# Patient Record
Sex: MALE | Race: WHITE | NOT HISPANIC OR LATINO | Employment: FULL TIME | URBAN - METROPOLITAN AREA
[De-identification: names, ages, dates, MRNs, and addresses within clinical notes are randomized per-mention and may not be internally consistent; named-entity substitution may affect disease eponyms.]

---

## 2017-02-04 DIAGNOSIS — I25.10 CORONARY ARTERY DISEASE INVOLVING NATIVE CORONARY ARTERY OF NATIVE HEART WITHOUT ANGINA PECTORIS: Primary | ICD-10-CM

## 2017-02-04 NOTE — TELEPHONE ENCOUNTER
nitroglycerin (NITROSTAT) 0.4 MG SL tablet      Last Written Prescription Date: 7/10/15  Last Fill Quantity: 25,  # refills: 2   Last Office Visit with [facility], [facility] or [facility] prescribing provider: 6/21/16

## 2017-02-06 NOTE — TELEPHONE ENCOUNTER
Looks like Dr. Quezada, Cardiology has been refilling this.  Called the pharmacy to advise, spoke to Derek.      Will also forward to Dr. Quezada.

## 2017-02-07 RX ORDER — NITROGLYCERIN 0.4 MG/1
0.4 TABLET SUBLINGUAL EVERY 5 MIN PRN
Qty: 25 TABLET | Refills: 2 | Status: SHIPPED | OUTPATIENT
Start: 2017-02-07 | End: 2019-11-13

## 2017-03-02 DIAGNOSIS — R73.01 IMPAIRED FASTING GLUCOSE: ICD-10-CM

## 2017-03-02 NOTE — TELEPHONE ENCOUNTER
METFORMIN 500MG         Last Written Prescription Date: 9/2/2016  Last Fill Quantity: 180, # refills: 1  Last Office Visit with G, P or [facility] prescribing provider:  6/21/2016        BP Readings from Last 3 Encounters:   06/21/16 106/64   04/14/16 98/62   01/06/16 106/66     Lab Results   Component Value Date    MICROL <5 08/02/2016     No results found for: MICROALBUMIN  Creatinine   Date Value Ref Range Status   10/13/2016 1.16 0.70 - 1.30 mg/dL Final   ]  GFR Estimate   Date Value Ref Range Status   10/13/2016 63 >60 mL/min/1.7m2 Final   04/14/2016 79 >60 mL/min/1.7m2 Final     Comment:     Non  GFR Calc   10/08/2015 77 >60 mL/min/1.7m2 Final     GFR Estimate If Black   Date Value Ref Range Status   10/13/2016 77 >60 mL/min/1.7m2 Final   04/14/2016 >90   GFR Calc   >60 mL/min/1.7m2 Final   10/08/2015 >90 >60 mL/min/1.7m2 Final     Lab Results   Component Value Date    CHOL 141 10/13/2016     Lab Results   Component Value Date    HDL 51 10/13/2016     Lab Results   Component Value Date    LDL 57 10/13/2016     Lab Results   Component Value Date    TRIG 165 10/13/2016     Lab Results   Component Value Date    CHOLHDLRATIO 2.6 10/08/2015     Lab Results   Component Value Date    AST 26 05/18/2010     Lab Results   Component Value Date    ALT <5 10/13/2016     Lab Results   Component Value Date    A1C 6.1 06/21/2016    A1C 6.2 02/05/2016    A1C 6.4 01/21/2015     Potassium   Date Value Ref Range Status   10/13/2016 4.1 3.5 - 5.1 mmol/L Final

## 2017-03-03 NOTE — TELEPHONE ENCOUNTER
"Prescription approved per [facility] Refill Protocol.    Copied from 6/21/16 office visit plan:  \"follow up in 1 year for follow up with me\"  "

## 2017-04-24 ENCOUNTER — PRE VISIT (OUTPATIENT)
Dept: CARDIOLOGY | Facility: CLINIC | Age: 67
End: 2017-04-24

## 2017-04-25 ENCOUNTER — OFFICE VISIT (OUTPATIENT)
Dept: CARDIOLOGY | Facility: CLINIC | Age: 67
End: 2017-04-25
Payer: COMMERCIAL

## 2017-04-25 VITALS
HEIGHT: 65 IN | DIASTOLIC BLOOD PRESSURE: 60 MMHG | SYSTOLIC BLOOD PRESSURE: 120 MMHG | WEIGHT: 185 LBS | BODY MASS INDEX: 30.82 KG/M2 | HEART RATE: 60 BPM

## 2017-04-25 DIAGNOSIS — I25.10 CORONARY ARTERY DISEASE INVOLVING NATIVE CORONARY ARTERY OF NATIVE HEART WITHOUT ANGINA PECTORIS: ICD-10-CM

## 2017-04-25 DIAGNOSIS — E78.2 MIXED HYPERLIPIDEMIA: ICD-10-CM

## 2017-04-25 DIAGNOSIS — I25.10 CORONARY ARTERY DISEASE INVOLVING NATIVE CORONARY ARTERY OF NATIVE HEART WITHOUT ANGINA PECTORIS: Primary | ICD-10-CM

## 2017-04-25 LAB
ALT SERPL W P-5'-P-CCNC: <5 U/L (ref 5–30)
ANION GAP SERPL CALCULATED.3IONS-SCNC: 11.2 MMOL/L (ref 6–17)
BUN SERPL-MCNC: 14 MG/DL (ref 7–30)
CALCIUM SERPL-MCNC: 9.3 MG/DL (ref 8.5–10.5)
CHLORIDE SERPL-SCNC: 103 MMOL/L (ref 98–107)
CHOLEST SERPL-MCNC: 136 MG/DL
CO2 SERPL-SCNC: 26 MMOL/L (ref 23–29)
CREAT SERPL-MCNC: 0.96 MG/DL (ref 0.7–1.3)
GFR SERPL CREATININE-BSD FRML MDRD: 79 ML/MIN/1.7M2
GLUCOSE SERPL-MCNC: 110 MG/DL (ref 70–105)
HDLC SERPL-MCNC: 44 MG/DL
LDLC SERPL CALC-MCNC: 44 MG/DL
NONHDLC SERPL-MCNC: 92 MG/DL
POTASSIUM SERPL-SCNC: 4.2 MMOL/L (ref 3.5–5.1)
SODIUM SERPL-SCNC: 136 MMOL/L (ref 136–145)
TRIGL SERPL-MCNC: 242 MG/DL

## 2017-04-25 PROCEDURE — 80061 LIPID PANEL: CPT | Performed by: PHYSICIAN ASSISTANT

## 2017-04-25 PROCEDURE — 99214 OFFICE O/P EST MOD 30 MIN: CPT | Performed by: INTERNAL MEDICINE

## 2017-04-25 PROCEDURE — 36415 COLL VENOUS BLD VENIPUNCTURE: CPT | Performed by: PHYSICIAN ASSISTANT

## 2017-04-25 PROCEDURE — 80048 BASIC METABOLIC PNL TOTAL CA: CPT | Performed by: PHYSICIAN ASSISTANT

## 2017-04-25 PROCEDURE — 84460 ALANINE AMINO (ALT) (SGPT): CPT | Performed by: PHYSICIAN ASSISTANT

## 2017-04-25 NOTE — MR AVS SNAPSHOT
After Visit Summary   4/25/2017    Guillermo Ford    MRN: [id number]           Patient Information     Date Of Birth          1950        Visit Information        Provider Department      4/25/2017 4:15 PM Hemal Quezada MD [facility]        Today's Diagnoses     Coronary artery disease involving native coronary artery of native heart without angina pectoris    -  1    Mixed hyperlipidemia           Follow-ups after your visit        Additional Services     Follow-Up with Cardiologist                 Your next 10 appointments already scheduled     Apr 26, 2017  8:00 AM CDT   SHORT with Laura Acevedo MD   [facility] ([facility])    [address]  [address]  [place] [address]   [phone]              Future tests that were ordered for you today     Open Future Orders        Priority Expected Expires Ordered    Exercise Stress Echocardiogram Routine 8/23/2017 4/25/2018 4/25/2017    Follow-Up with Cardiologist Routine 8/23/2017 4/25/2018 4/25/2017    Basic metabolic panel Routine 8/23/2017 4/25/2018 4/25/2017    Lipid Profile Routine 8/23/2017 4/25/2018 4/25/2017    ALT Routine 8/23/2017 4/25/2018 4/25/2017            Who to contact     If you have questions or need follow up information about today's clinic visit or your schedule please contact [facility] directly at [phone].  Normal or non-critical lab and imaging results will be communicated to you by MyChart, letter or phone within 4 business days after the clinic has received the results. If you do not hear from us within 7 days, please contact the clinic through MyChart or phone. If you have a critical or abnormal lab result, we will notify you by phone as soon as possible.  Submit refill requests through [email or web address] or call your pharmacy and they will forward the refill request to us. Please allow 3  "business days for your refill to be completed.          Additional Information About Your Visit        MyChart Information     [employer] gives you secure access to your electronic health record. If you see a primary care provider, you can also send messages to your care team and make appointments. If you have questions, please call your primary care clinic.  If you do not have a primary care provider, please call [phone] and they will assist you.        Care EveryWhere ID     This is your Care EveryWhere ID. This could be used by other organizations to access your [facility] medical records  [id number]        Your Vitals Were     Pulse Height BMI (Body Mass Index)             60 1.651 m (5' 5\") 30.79 kg/m2          Blood Pressure from Last 3 Encounters:   04/25/17 120/60   06/21/16 106/64   04/14/16 98/62    Weight from Last 3 Encounters:   04/25/17 83.9 kg (185 lb)   06/21/16 82.9 kg (182 lb 12.8 oz)   04/14/16 82.6 kg (182 lb)               Primary Care Provider Office Phone # Fax #    Kvng Smith [phone]       [address] [place] [address]        Thank you!     Thank you for choosing [facility] [place]  for your care. Our goal is always to provide you with excellent care. Hearing back from our patients is one way we can continue to improve our services. Please take a few minutes to complete the written survey that you may receive in the mail after your visit with us. Thank you!             Your Updated Medication List - Protect others around you: Learn how to safely use, store and throw away your medicines at www.disposemymeds.org.          This list is accurate as of: 4/25/17  5:01 PM.  Always use your most recent med list.                   Brand Name Dispense Instructions for use    aspirin 81 MG tablet      Take 81 mg by mouth Takes every other day.       clopidogrel 75 MG tablet    PLAVIX    90 tablet    Take 1 " tablet (75 mg) by mouth daily       ezetimibe-simvastatin 10-20 MG per tablet    VYTORIN    90 tablet    Take 1 tablet by mouth At Bedtime       lisinopril 5 MG tablet    PRINIVIL/ZESTRIL    90 tablet    Take 1 tablet (5 mg) by mouth daily       metFORMIN 500 MG tablet    GLUCOPHAGE    180 tablet    Take 1 tablet (500 mg) by mouth 2 times daily (with meals)       metoprolol 100 MG 24 hr tablet    TOPROL XL    90 tablet    Take 1 tablet (100 mg) by mouth daily       nitroglycerin 0.4 MG sublingual tablet    NITROSTAT    25 tablet    Place 1 tablet (0.4 mg) under the tongue every 5 minutes as needed for chest pain May repeat X 2. If no relief after 3 tablets call 911       sildenafil 50 MG cap/tab    VIAGRA    12 tablet    Take 0.5-1 tablets (25-50 mg) by mouth daily as needed for erectile dysfunction DO NOT USE WITH NITROGLYCERIN

## 2017-04-25 NOTE — LETTER
4/25/2017    Kvng Smith MD  [facility]   [address] [place] [address]    RE: Guillermo Ford       Dear Colleague,    I had the pleasure of seeing Guillermo Ford in the [facility].    The patient is a 66-year-old overweight white male hospitalized at [facility] in 2005 with evidence of an inferior wall myocardial infarction after being transferred from [facility].  Cardiac catheterization and coronary angiography that revealed severe stenosis of the distal right coronary artery involving the PDA treated with PTCA and stent placement and an old angioplasty stent in the ostium of the posterior descending coronary artery.  There was previous hypokinesis in the inferior wall.  Ejection fraction at that time estimated at 35%-40%.        Echocardiography performed in 2015 demonstrated ejection fraction 55%-60% with diastolic dysfunction with no significant regional wall motion abnormality, no significant valvular insufficiency or dysfunction.  Nuclear stress test performed also in 2015 showed a small scar involving the inferior wall without vicenta-infarct ischemia with mild inferior hypokinesis with ejection fraction 54%, no significant change from 2013.        The patient is participating in activities without significant restriction.  He denies significant symptoms of chest discomfort, shortness of breath, dizziness, palpitations, nausea, vomiting, diaphoresis or syncope.  He denies PND, orthopnea, fever, chills or sweats.      MEDICATIONS:   1.  Metformin 500 mg twice a day with meals.   2.  Nitroglycerin 0.4 mg sublingual p.r.n., which has not been used.   3.  Metoprolol  mg a day.   4.  Lisinopril 5 mg a day.   5.  Viagra 25-50 mg as-needed.   6.  Ezetimibe/simvastatin 10-20 mg 1 at bedtime.   7.  Clopidogrel 75 mg a day.   8.  Aspirin 81 mg a day.      LABORATORY DATA:  Demonstrated cholesterol 136, HDL 44, LDL 44,  triglyceride 242.  Sodium 136, potassium 4.2, BUN 14, creatinine 0.96.  ALT less than 5.      The patient presents to Cardiology Clinic for followup of ischemic heart disease.      PHYSICAL EXAMINATION:   VITAL SIGNS:  Blood pressure 120/60 with a heart rate of 60 and regular.  Weight was 185 pounds, which is stable.   NECK:  Without jugular venous distention, carotid bruit or palpable thyroid.   CHEST:  Essentially clear to percussion and auscultation with slight decreased breath sounds at the bases.   CARDIAC:  Regular rhythm.  There is a soft S4 gallop.  There is a 1-2/6 systolic murmur at the left sternal border with minimal radiation.  No diastolic murmur, rub or S3.   EXTREMITIES:  Without cyanosis or edema.      CLINICAL IMPRESSION:   1.  Stable cardiac condition.   2.  History of ischemic heart disease, status post inferior wall myocardial infarction, PTCA and stent in the proximal posterior descending branch of the right coronary artery in 2005.   3.  History of hyperlipidemia with metabolic syndrome at goal with hypertriglyceridemia.   4.  Sleep apnea.   5.  Gastroesophageal reflux disease.   6.  Gout.   7.  Overweight.   8.  Type 2 diabetes mellitus, non-insulin dependent.      DISCUSSION:  The patient has done reasonably well from a cardiac viewpoint.  He unfortunately has been unable to lose any weight, but has not had any significant symptoms of angina pectoris or congestive heart failure.  He is being treated aggressively for type 2 diabetes/noninsulin dependent, which is most likely related somewhat to his increased weight.  LDL cholesterol has been decreased, but there has also been continued hypertriglyceridemia.  We would continue his medications unchanged at this time.  He will need close followup of serum lipids, basic metabolic panel and blood pressure.  He will have stress testing in the form of stress echocardiography later this year to evaluate ischemic status of the myocardium and medical  efficacy.  Otherwise, he appears stable and is tolerating his medications without significant problem.      RECOMMENDATIONS:   1.  Continue present medications.   2.  Diet and exercise program to lose weight.   3.  Aggressive diabetic management.   4.  Close followup of serum lipids, basic metabolic panel and blood pressure.   5.  Stress echocardiography on medication to check ischemic status of the myocardium and medical efficacy.   6.  Routine medical followup.   7.  Cardiology followup in 4-6 months.                Again, thank you for allowing me to participate in the care of your patient.      Sincerely,    Hemal Quezada MD     [facility]

## 2017-04-26 ENCOUNTER — OFFICE VISIT (OUTPATIENT)
Dept: PEDIATRICS | Facility: CLINIC | Age: 67
End: 2017-04-26
Payer: COMMERCIAL

## 2017-04-26 VITALS
HEART RATE: 64 BPM | WEIGHT: 184 LBS | OXYGEN SATURATION: 94 % | BODY MASS INDEX: 30.66 KG/M2 | DIASTOLIC BLOOD PRESSURE: 68 MMHG | SYSTOLIC BLOOD PRESSURE: 118 MMHG | HEIGHT: 65 IN | TEMPERATURE: 98 F

## 2017-04-26 DIAGNOSIS — H61.21 IMPACTED CERUMEN OF RIGHT EAR: Primary | ICD-10-CM

## 2017-04-26 PROCEDURE — 99213 OFFICE O/P EST LOW 20 MIN: CPT | Mod: GC | Performed by: INTERNAL MEDICINE

## 2017-04-26 NOTE — PATIENT INSTRUCTIONS
Glad we could clean out the ear for you!  If this happens again in the future you are welcome to try some Auralgan drops prior to coming in. We would be glad to help either way.    It was a pleasure to see you today!    Dr. Laura Acevedo

## 2017-04-26 NOTE — PROGRESS NOTES
SUBJECTIVE:                                                    Guillermo Frod is a 66 year old male who presents to clinic today for the following health issues:    Ear Problem       Duration: 5 days     Description (location/character/radiation): Right ear feels plugged.    Intensity:  moderate    Accompanying signs and symptoms: no pain, runny nose, fever, cough. Left ear feels normal.    History (similar episodes/previous evaluation): One prior episode.    Precipitating or alleviating factors: Worse in the morning after his shower and gets better as the day goes on.     Therapies tried and outcome: None- not tried drops     Problem list and histories reviewed & adjusted, as indicated.  Additional history: as documented    Patient Active Problem List   Diagnosis     Lumbago     Gout     Diverticulitis of colon     Lumbar spinal stenosis     CAD (coronary artery disease)     Mixed hyperlipidemia     Sleep apnea     GERD (gastroesophageal reflux disease)     Impaired fasting glucose     History of acute inferior wall MI     Left ventricular diastolic dysfunction     ACP (advance care planning)     Past Surgical History:   Procedure Laterality Date     HC CORONARY STENT PERCUT, INITIAL VESSEL  4/18/2005    PTCA with intracoronary stent placement of, distal RCA and ostial PDA     HC REPAIR OF NASAL SEPTUM      Septoplasty and uvulectomy       Social History   Substance Use Topics     Smoking status: Former Smoker     Quit date: 4/27/2000     Smokeless tobacco: Never Used      Comment: previous 2 ppd smoker     Alcohol use No     Family History   Problem Relation Age of Onset     C.A.D. Mother      C.A.D. Father      DIABETES Brother      unsure of type 1/2     Cancer - colorectal No family hx of      Prostate Cancer No family hx of          Current Outpatient Prescriptions   Medication Sig Dispense Refill     metFORMIN (GLUCOPHAGE) 500 MG tablet Take 1 tablet (500 mg) by mouth 2 times daily (with meals) 180 tablet 0  "    metoprolol (TOPROL XL) 100 MG 24 hr tablet Take 1 tablet (100 mg) by mouth daily 90 tablet 3     lisinopril (PRINIVIL,ZESTRIL) 5 MG tablet Take 1 tablet (5 mg) by mouth daily 90 tablet 3     ezetimibe-simvastatin (VYTORIN) 10-20 MG per tablet Take 1 tablet by mouth At Bedtime 90 tablet 3     clopidogrel (PLAVIX) 75 MG tablet Take 1 tablet (75 mg) by mouth daily 90 tablet 3     aspirin 81 MG tablet Take 81 mg by mouth Takes every other day.       nitroglycerin (NITROSTAT) 0.4 MG sublingual tablet Place 1 tablet (0.4 mg) under the tongue every 5 minutes as needed for chest pain May repeat X 2. If no relief after 3 tablets call 911 (Patient not taking: Reported on 4/26/2017) 25 tablet 2     sildenafil (VIAGRA) 50 MG tablet Take 0.5-1 tablets (25-50 mg) by mouth daily as needed for erectile dysfunction DO NOT USE WITH NITROGLYCERIN 12 tablet 3     No Known Allergies  BP Readings from Last 3 Encounters:   04/26/17 118/68   04/25/17 120/60   06/21/16 106/64    Wt Readings from Last 3 Encounters:   04/26/17 184 lb (83.5 kg)   04/25/17 185 lb (83.9 kg)   06/21/16 182 lb 12.8 oz (82.9 kg)         Reviewed and updated as needed this visit by clinical staff  Tobacco  Allergies  Meds  Problems  Med Hx  Surg Hx  Fam Hx  Soc Hx        Reviewed and updated as needed this visit by Provider  Meds  Problems       ROS:  Constitutional, HEENT, cardiovascular, pulmonary, gi and gu systems are negative, except as otherwise noted.    OBJECTIVE:                                                    /68 (BP Location: Right arm, Cuff Size: Adult Regular)  Pulse 64  Temp 98  F (36.7  C) (Oral)  Ht 5' 5\" (1.651 m)  Wt 184 lb (83.5 kg)  SpO2 94%  BMI 30.62 kg/m2  Body mass index is 30.62 kg/(m^2).  GENERAL: healthy, alert and no distress  EYES: Eyes grossly normal to inspection, conjunctivae and sclerae normal  HENT: Right TM unable to be visualized due to large portion of cerumen. After lavage, R TM visualized. R EAC " mildly erythematous after lavage. L TM clear.   Nose and mouth without ulcers or lesions  RESP: lungs clear to auscultation - no rales, rhonchi or wheezes  CV: regular rate and rhythm, normal S1 S2, no S3 or S4, no murmur, click or rub    Diagnostic Test Results:  none      ASSESSMENT/PLAN:                                                    (H61.21) Impacted cerumen of right ear  (primary encounter diagnosis)  Comment: Seen on exam. Removed with lavage with symptom relief afterward. Discussed prevention ideas and/or over the counter options  Plan: Lavage.   -Recommended OTC debrox drops if happens again    Follow-up: as needed    Laura Acevedo MD  [facility]    I have seen patient, discussed with the resident, and agree with the history, physical and plan as documented above.    Olimpia Smith MD  Internal Medicine - Pediatrics

## 2017-04-26 NOTE — MR AVS SNAPSHOT
After Visit Summary   4/26/2017    Guillermo Ford    MRN: [id number]           Patient Information     Date Of Birth          1950        Visit Information        Provider Department      4/26/2017 8:00 AM [facility] MD [facility]        Today's Diagnoses     Impacted cerumen of right ear    -  1      Care Instructions    Glad we could clean out the ear for you!  If this happens again in the future you are welcome to try some Auralgan drops prior to coming in. We would be glad to help either way.    It was a pleasure to see you today!    Dr. Laura Acevedo          Follow-ups after your visit        Future tests that were ordered for you today     Open Future Orders        Priority Expected Expires Ordered    Exercise Stress Echocardiogram Routine 8/23/2017 4/25/2018 4/25/2017    Follow-Up with Cardiologist Routine 8/23/2017 4/25/2018 4/25/2017    Basic metabolic panel Routine 8/23/2017 4/25/2018 4/25/2017    Lipid Profile Routine 8/23/2017 4/25/2018 4/25/2017    ALT Routine 8/23/2017 4/25/2018 4/25/2017            Who to contact     If you have questions or need follow up information about today's clinic visit or your schedule please contact [facility] directly at [phone].  Normal or non-critical lab and imaging results will be communicated to you by MyChart, letter or phone within 4 business days after the clinic has received the results. If you do not hear from us within 7 days, please contact the clinic through MyChart or phone. If you have a critical or abnormal lab result, we will notify you by phone as soon as possible.  Submit refill requests through [employer] or call your pharmacy and they will forward the refill request to us. Please allow 3 business days for your refill to be completed.          Additional Information About Your Visit        J[employer] Information     [employer] gives you secure access to your electronic health record. If you see a primary  "care provider, you can also send messages to your care team and make appointments. If you have questions, please call your primary care clinic.  If you do not have a primary care provider, please call [phone] and they will assist you.        Care EveryWhere ID     This is your Care EveryWhere ID. This could be used by other organizations to access your [facility] medical records  [id number]        Your Vitals Were     Pulse Temperature Height Pulse Oximetry BMI (Body Mass Index)       64 98  F (36.7  C) (Oral) 5' 5\" (1.651 m) 94% 30.62 kg/m2        Blood Pressure from Last 3 Encounters:   04/26/17 118/68   04/25/17 120/60   06/21/16 106/64    Weight from Last 3 Encounters:   04/26/17 184 lb (83.5 kg)   04/25/17 185 lb (83.9 kg)   06/21/16 182 lb 12.8 oz (82.9 kg)              Today, you had the following     No orders found for display       Primary Care Provider Office Phone # Fax #    Kvng Smith [phone]       [address] [place] [address]        Thank you!     Thank you for choosing [facility]  for your care. Our goal is always to provide you with excellent care. Hearing back from our patients is one way we can continue to improve our services. Please take a few minutes to complete the written survey that you may receive in the mail after your visit with us. Thank you!             Your Updated Medication List - Protect others around you: Learn how to safely use, store and throw away your medicines at www.disposemymeds.org.          This list is accurate as of: 4/26/17  8:34 AM.  Always use your most recent med list.                   Brand Name Dispense Instructions for use    aspirin 81 MG tablet      Take 81 mg by mouth Takes every other day.       clopidogrel 75 MG tablet    PLAVIX    90 tablet    Take 1 tablet (75 mg) by mouth daily       ezetimibe-simvastatin 10-20 MG per tablet    VYTORIN    90 tablet    Take 1 tablet by mouth At " Bedtime       lisinopril 5 MG tablet    PRINIVIL/ZESTRIL    90 tablet    Take 1 tablet (5 mg) by mouth daily       metFORMIN 500 MG tablet    GLUCOPHAGE    180 tablet    Take 1 tablet (500 mg) by mouth 2 times daily (with meals)       metoprolol 100 MG 24 hr tablet    TOPROL XL    90 tablet    Take 1 tablet (100 mg) by mouth daily       nitroglycerin 0.4 MG sublingual tablet    NITROSTAT    25 tablet    Place 1 tablet (0.4 mg) under the tongue every 5 minutes as needed for chest pain May repeat X 2. If no relief after 3 tablets call 911       sildenafil 50 MG cap/tab    VIAGRA    12 tablet    Take 0.5-1 tablets (25-50 mg) by mouth daily as needed for erectile dysfunction DO NOT USE WITH NITROGLYCERIN

## 2017-04-26 NOTE — PROGRESS NOTES
HISTORY OF PRESENT ILLNESS:  The patient is a 66-year-old overweight white male hospitalized at [facility] in 2005 with evidence of an inferior wall myocardial infarction after being transferred from [facility].  Cardiac catheterization and coronary angiography that revealed severe stenosis of the distal right coronary artery involving the PDA treated with PTCA and stent placement and an old angioplasty stent in the ostium of the posterior descending coronary artery.  There was previous hypokinesis in the inferior wall.  Ejection fraction at that time estimated at 35%-40%.        Echocardiography performed in 2015 demonstrated ejection fraction 55%-60% with diastolic dysfunction with no significant regional wall motion abnormality, no significant valvular insufficiency or dysfunction.  Nuclear stress test performed also in 2015 showed a small scar involving the inferior wall without vicenta-infarct ischemia with mild inferior hypokinesis with ejection fraction 54%, no significant change from 2013.        The patient is participating in activities without significant restriction.  He denies significant symptoms of chest discomfort, shortness of breath, dizziness, palpitations, nausea, vomiting, diaphoresis or syncope.  He denies PND, orthopnea, fever, chills or sweats.      MEDICATIONS:   1.  Metformin 500 mg twice a day with meals.   2.  Nitroglycerin 0.4 mg sublingual p.r.n., which has not been used.   3.  Metoprolol  mg a day.   4.  Lisinopril 5 mg a day.   5.  Viagra 25-50 mg as-needed.   6.  Ezetimibe/simvastatin 10-20 mg 1 at bedtime.   7.  Clopidogrel 75 mg a day.   8.  Aspirin 81 mg a day.      LABORATORY DATA:  Demonstrated cholesterol 136, HDL 44, LDL 44, triglyceride 242.  Sodium 136, potassium 4.2, BUN 14, creatinine 0.96.  ALT less than 5.      The patient presents to Cardiology Clinic for followup of ischemic heart disease.      PHYSICAL EXAMINATION:   VITAL SIGNS:   Blood pressure 120/60 with a heart rate of 60 and regular.  Weight was 185 pounds, which is stable.   NECK:  Without jugular venous distention, carotid bruit or palpable thyroid.   CHEST:  Essentially clear to percussion and auscultation with slight decreased breath sounds at the bases.   CARDIAC:  Regular rhythm.  There is a soft S4 gallop.  There is a 1-2/6 systolic murmur at the left sternal border with minimal radiation.  No diastolic murmur, rub or S3.   EXTREMITIES:  Without cyanosis or edema.      CLINICAL IMPRESSION:   1.  Stable cardiac condition.   2.  History of ischemic heart disease, status post inferior wall myocardial infarction, PTCA and stent in the proximal posterior descending branch of the right coronary artery in 2005.   3.  History of hyperlipidemia with metabolic syndrome at goal with hypertriglyceridemia.   4.  Sleep apnea.   5.  Gastroesophageal reflux disease.   6.  Gout.   7.  Overweight.   8.  Type 2 diabetes mellitus, non-insulin dependent.      DISCUSSION:  The patient has done reasonably well from a cardiac viewpoint.  He unfortunately has been unable to lose any weight, but has not had any significant symptoms of angina pectoris or congestive heart failure.  He is being treated aggressively for type 2 diabetes/noninsulin dependent, which is most likely related somewhat to his increased weight.  LDL cholesterol has been decreased, but there has also been continued hypertriglyceridemia.  We would continue his medications unchanged at this time.  He will need close followup of serum lipids, basic metabolic panel and blood pressure.  He will have stress testing in the form of stress echocardiography later this year to evaluate ischemic status of the myocardium and medical efficacy.  Otherwise, he appears stable and is tolerating his medications without significant problem.      RECOMMENDATIONS:   1.  Continue present medications.   2.  Diet and exercise program to lose weight.   3.   Aggressive diabetic management.   4.  Close followup of serum lipids, basic metabolic panel and blood pressure.   5.  Stress echocardiography on medication to check ischemic status of the myocardium and medical efficacy.   6.  Routine medical followup.   7.  Cardiology followup in 4-6 months.      cc:      Kvng Smith MD    [address] [address]         ANGELINA RAMON MD, [facility]             D: 2017 22:11   T: 2017 16:59   MT: EDMOND      Name:     LOLA GRIFFIN   MRN:      [id number]        Account:      [id number]   :      1950           Service Date: 2017      Document: [id number]

## 2017-04-26 NOTE — NURSING NOTE
Cerumenosis is noted in right ear.  Wax is removed by syringing with normal h2o/1-2 drops of perioxide and manual debridement. Pt tolerated well .Abril HAJI MA

## 2017-04-26 NOTE — NURSING NOTE
"Chief Complaint   Patient presents with     Ear Problem       Initial /68 (BP Location: Right arm, Cuff Size: Adult Regular)  Pulse 64  Temp 98  F (36.7  C) (Oral)  Ht 5' 5\" (1.651 m)  Wt 184 lb (83.5 kg)  SpO2 94%  BMI 30.62 kg/m2 Estimated body mass index is 30.62 kg/(m^2) as calculated from the following:    Height as of this encounter: 5' 5\" (1.651 m).    Weight as of this encounter: 184 lb (83.5 kg).  Medication Reconciliation: complete   Ileana Del Cid MA    "

## 2017-06-03 DIAGNOSIS — R73.01 IMPAIRED FASTING GLUCOSE: ICD-10-CM

## 2017-06-05 DIAGNOSIS — E78.2 MIXED HYPERLIPIDEMIA: ICD-10-CM

## 2017-06-05 RX ORDER — EZETIMIBE AND SIMVASTATIN 10; 20 MG/1; MG/1
1 TABLET ORAL AT BEDTIME
Qty: 90 TABLET | Refills: 3 | Status: SHIPPED | OUTPATIENT
Start: 2017-06-05 | End: 2018-06-01

## 2017-06-05 NOTE — TELEPHONE ENCOUNTER
Made an appointment for physical and labs 6/16/17, but needs to be after 6/21/17.     No answer when I called back. LM to call back and change date.     Toya Altamirano MA   June 5, 2017,  4:10 PM

## 2017-06-05 NOTE — TELEPHONE ENCOUNTER
Medication is being filled for 1 time refill only due to:  due for an A1C.    Please call patient to help schedule lab visit.   Ana Candelario RN  Triage Nurse

## 2017-06-05 NOTE — TELEPHONE ENCOUNTER
metFORMIN (GLUCOPHAGE) 500 MG tablet         Last Written Prescription Date: 3/3/2017  Last Fill Quantity: 180, # refills: 0  Last Office Visit with G, [facility] or [facility] prescribing provider:  4/26/2017        BP Readings from Last 3 Encounters:   04/26/17 118/68   04/25/17 120/60   06/21/16 106/64     Lab Results   Component Value Date    MICROL <5 08/02/2016     Lab Results   Component Value Date    UMALCR Unable to calculate due to low value 08/02/2016     Creatinine   Date Value Ref Range Status   04/25/2017 0.96 0.70 - 1.30 mg/dL Final   ]  GFR Estimate   Date Value Ref Range Status   04/25/2017 79 >60 mL/min/1.7m2 Final   10/13/2016 63 >60 mL/min/1.7m2 Final   04/14/2016 79 >60 mL/min/1.7m2 Final     Comment:     Non  GFR Calc     GFR Estimate If Black   Date Value Ref Range Status   04/25/2017 >90 >60 mL/min/1.7m2 Final   10/13/2016 77 >60 mL/min/1.7m2 Final   04/14/2016 >90   GFR Calc   >60 mL/min/1.7m2 Final     Lab Results   Component Value Date    CHOL 136 04/25/2017     Lab Results   Component Value Date    HDL 44 04/25/2017     Lab Results   Component Value Date    LDL 44 04/25/2017     Lab Results   Component Value Date    TRIG 242 04/25/2017     Lab Results   Component Value Date    CHOLHDLRATIO 2.6 10/08/2015     Lab Results   Component Value Date    AST 26 05/18/2010     Lab Results   Component Value Date    ALT <5 04/25/2017     Lab Results   Component Value Date    A1C 6.1 06/21/2016    A1C 6.2 02/05/2016    A1C 6.4 01/21/2015     Potassium   Date Value Ref Range Status   04/25/2017 4.2 3.5 - 5.1 mmol/L Final

## 2017-06-23 DIAGNOSIS — I25.10 CORONARY ARTERY DISEASE INVOLVING NATIVE CORONARY ARTERY WITHOUT ANGINA PECTORIS: ICD-10-CM

## 2017-06-23 RX ORDER — CLOPIDOGREL BISULFATE 75 MG/1
75 TABLET ORAL DAILY
Qty: 90 TABLET | Refills: 0 | Status: SHIPPED | OUTPATIENT
Start: 2017-06-23 | End: 2017-06-26

## 2017-06-26 DIAGNOSIS — I25.10 CORONARY ARTERY DISEASE INVOLVING NATIVE CORONARY ARTERY WITHOUT ANGINA PECTORIS: ICD-10-CM

## 2017-06-26 RX ORDER — CLOPIDOGREL BISULFATE 75 MG/1
75 TABLET ORAL DAILY
Qty: 90 TABLET | Refills: 3 | Status: SHIPPED | OUTPATIENT
Start: 2017-06-26 | End: 2018-07-05

## 2017-06-29 ENCOUNTER — TELEPHONE (OUTPATIENT)
Dept: CARDIOLOGY | Facility: CLINIC | Age: 67
End: 2017-06-29

## 2017-06-29 NOTE — TELEPHONE ENCOUNTER
Message from patient that he needs a refill, did not state which medication needs an update. Attempted to contact patient and left message for patient to call back.

## 2017-06-29 NOTE — TELEPHONE ENCOUNTER
Spoke with patient to let him know the plavix refill was sent on Monday at 12:48. Patient states he had checked there on Monday morning. He thinks this is the refill he needed this week and will call back if he needs any others sent in.

## 2017-08-12 DIAGNOSIS — R73.01 IMPAIRED FASTING GLUCOSE: ICD-10-CM

## 2017-08-12 DIAGNOSIS — N52.9 ERECTILE DYSFUNCTION, UNSPECIFIED ERECTILE DYSFUNCTION TYPE: Primary | ICD-10-CM

## 2017-08-12 NOTE — TELEPHONE ENCOUNTER
Pending Prescriptions:                       Disp   Refills    sildenafil (VIAGRA) 50 MG cap/tab         12 tab*3            Sig: Take 0.5-1 tablets (25-50 mg) by mouth daily as           needed for erectile dysfunction DO NOT USE WITH           NITROGLYCERIN          Last Written Prescription Date: 09/02/2016  Last Fill Quantity: 12,  # refills: 3   Last Office Visit with [facility], [facility] or [facility] prescribing provider: 04/26/2017                                         Next 5 appointments (look out 90 days)     Oct 03, 2017  8:45 AM CDT   Return Visit with Hemal Quezada MD   [facility] [place] ([facility])    [address] [place] [address] [address]  [facility] [id number] [phone]                  Graeme FLEMINGT

## 2017-08-14 NOTE — TELEPHONE ENCOUNTER
metFORMIN (GLUCOPHAGE) 500 MG tablet         Last Written Prescription Date: 6/5/2017  Last Fill Quantity: 60, # refills: 0  Last Office Visit with [facility], [facility] or [facility] prescribing provider:  4/26/2017   Next 5 appointments (look out 90 days)     Aug 18, 2017  9:20 AM CDT   PHYSICAL with Kvng Smith MD   [facility] ([facility])    [address]  [address]  [place] [id number] [phone]            Oct 03, 2017  8:45 AM CDT   Return Visit with Hemal Quezada MD   [facility] AT [place] ([facility])    [address]  [place] [address] [phone]                   BP Readings from Last 3 Encounters:   04/26/17 118/68   04/25/17 120/60   06/21/16 106/64     Lab Results   Component Value Date    MICROL <5 08/02/2016     Lab Results   Component Value Date    UMALCR Unable to calculate due to low value 08/02/2016     Creatinine   Date Value Ref Range Status   04/25/2017 0.96 0.70 - 1.30 mg/dL Final   ]  GFR Estimate   Date Value Ref Range Status   04/25/2017 79 >60 mL/min/1.7m2 Final   10/13/2016 63 >60 mL/min/1.7m2 Final   04/14/2016 79 >60 mL/min/1.7m2 Final     Comment:     Non  GFR Calc     GFR Estimate If Black   Date Value Ref Range Status   04/25/2017 >90 >60 mL/min/1.7m2 Final   10/13/2016 77 >60 mL/min/1.7m2 Final   04/14/2016 >90   GFR Calc   >60 mL/min/1.7m2 Final     Lab Results   Component Value Date    CHOL 136 04/25/2017     Lab Results   Component Value Date    HDL 44 04/25/2017     Lab Results   Component Value Date    LDL 44 04/25/2017     Lab Results   Component Value Date    TRIG 242 04/25/2017     Lab Results   Component Value Date    CHOLHDLRATIO 2.6 10/08/2015     Lab Results   Component Value Date    AST 26 05/18/2010     Lab Results   Component Value Date    ALT <5 04/25/2017     Lab Results   Component Value Date    A1C 6.1 06/21/2016    A1C 6.2  02/05/2016    A1C 6.4 01/21/2015     Potassium   Date Value Ref Range Status   04/25/2017 4.2 3.5 - 5.1 mmol/L Final

## 2017-08-15 RX ORDER — SILDENAFIL 50 MG/1
25-50 TABLET, FILM COATED ORAL DAILY PRN
Qty: 12 TABLET | Refills: 3 | Status: SHIPPED | OUTPATIENT
Start: 2017-08-15 | End: 2017-08-18

## 2017-08-15 NOTE — TELEPHONE ENCOUNTER
Routing refill request to provider for review/approval because:  Drug interaction warning    Please sign if appropriate.     Deirde DEE RN, BSN, PHN  Ryegate Flex RN

## 2017-08-18 ENCOUNTER — OFFICE VISIT (OUTPATIENT)
Dept: PEDIATRICS | Facility: CLINIC | Age: 67
End: 2017-08-18
Payer: COMMERCIAL

## 2017-08-18 VITALS
RESPIRATION RATE: 16 BRPM | DIASTOLIC BLOOD PRESSURE: 60 MMHG | BODY MASS INDEX: 31.09 KG/M2 | HEART RATE: 48 BPM | TEMPERATURE: 97.9 F | SYSTOLIC BLOOD PRESSURE: 92 MMHG | HEIGHT: 64 IN | WEIGHT: 182.1 LBS

## 2017-08-18 DIAGNOSIS — Z23 NEED FOR PNEUMOCOCCAL VACCINE: ICD-10-CM

## 2017-08-18 DIAGNOSIS — R73.01 IMPAIRED FASTING GLUCOSE: ICD-10-CM

## 2017-08-18 DIAGNOSIS — Z00.00 ENCOUNTER FOR ROUTINE ADULT HEALTH EXAMINATION WITHOUT ABNORMAL FINDINGS: Primary | ICD-10-CM

## 2017-08-18 DIAGNOSIS — N52.9 ERECTILE DYSFUNCTION, UNSPECIFIED ERECTILE DYSFUNCTION TYPE: ICD-10-CM

## 2017-08-18 DIAGNOSIS — I25.10 CORONARY ARTERY DISEASE INVOLVING NATIVE CORONARY ARTERY OF NATIVE HEART WITHOUT ANGINA PECTORIS: ICD-10-CM

## 2017-08-18 LAB
ALBUMIN SERPL-MCNC: 3.6 G/DL (ref 3.4–5)
ALP SERPL-CCNC: 62 U/L (ref 40–150)
ALT SERPL W P-5'-P-CCNC: 34 U/L (ref 0–70)
ANION GAP SERPL CALCULATED.3IONS-SCNC: 6 MMOL/L (ref 3–14)
AST SERPL W P-5'-P-CCNC: 22 U/L (ref 0–45)
BILIRUB SERPL-MCNC: 0.6 MG/DL (ref 0.2–1.3)
BUN SERPL-MCNC: 12 MG/DL (ref 7–30)
CALCIUM SERPL-MCNC: 8.7 MG/DL (ref 8.5–10.1)
CHLORIDE SERPL-SCNC: 106 MMOL/L (ref 94–109)
CHOLEST SERPL-MCNC: 139 MG/DL
CO2 SERPL-SCNC: 27 MMOL/L (ref 20–32)
CREAT SERPL-MCNC: 0.85 MG/DL (ref 0.66–1.25)
GFR SERPL CREATININE-BSD FRML MDRD: >90 ML/MIN/1.7M2
GLUCOSE SERPL-MCNC: 111 MG/DL (ref 70–99)
HBA1C MFR BLD: 5.9 % (ref 4.3–6)
HDLC SERPL-MCNC: 57 MG/DL
LDLC SERPL CALC-MCNC: 44 MG/DL
NONHDLC SERPL-MCNC: 82 MG/DL
POTASSIUM SERPL-SCNC: 4.1 MMOL/L (ref 3.4–5.3)
PROT SERPL-MCNC: 7 G/DL (ref 6.8–8.8)
PSA SERPL-ACNC: 1.88 UG/L (ref 0–4)
SODIUM SERPL-SCNC: 139 MMOL/L (ref 133–144)
TRIGL SERPL-MCNC: 188 MG/DL

## 2017-08-18 PROCEDURE — 80061 LIPID PANEL: CPT | Performed by: INTERNAL MEDICINE

## 2017-08-18 PROCEDURE — 36415 COLL VENOUS BLD VENIPUNCTURE: CPT | Performed by: INTERNAL MEDICINE

## 2017-08-18 PROCEDURE — G0103 PSA SCREENING: HCPCS | Performed by: INTERNAL MEDICINE

## 2017-08-18 PROCEDURE — 99397 PER PM REEVAL EST PAT 65+ YR: CPT | Mod: 25 | Performed by: INTERNAL MEDICINE

## 2017-08-18 PROCEDURE — 83036 HEMOGLOBIN GLYCOSYLATED A1C: CPT | Performed by: INTERNAL MEDICINE

## 2017-08-18 PROCEDURE — 90670 PCV13 VACCINE IM: CPT | Performed by: INTERNAL MEDICINE

## 2017-08-18 PROCEDURE — 80053 COMPREHEN METABOLIC PANEL: CPT | Performed by: INTERNAL MEDICINE

## 2017-08-18 PROCEDURE — 90471 IMMUNIZATION ADMIN: CPT | Performed by: INTERNAL MEDICINE

## 2017-08-18 RX ORDER — SILDENAFIL CITRATE 20 MG/1
40 TABLET ORAL DAILY PRN
Qty: 60 TABLET | Refills: 11 | Status: SHIPPED | OUTPATIENT
Start: 2017-08-18 | End: 2018-09-08

## 2017-08-18 RX ORDER — METOPROLOL SUCCINATE 100 MG/1
100 TABLET, EXTENDED RELEASE ORAL DAILY
Qty: 90 TABLET | Refills: 3 | Status: SHIPPED | OUTPATIENT
Start: 2017-08-18 | End: 2018-09-01

## 2017-08-18 RX ORDER — SILDENAFIL 50 MG/1
25-50 TABLET, FILM COATED ORAL DAILY PRN
Qty: 12 TABLET | Refills: 3 | Status: CANCELLED | OUTPATIENT
Start: 2017-08-18

## 2017-08-18 RX ORDER — LISINOPRIL 5 MG/1
5 TABLET ORAL DAILY
Qty: 90 TABLET | Refills: 3 | Status: SHIPPED | OUTPATIENT
Start: 2017-08-18 | End: 2018-09-01

## 2017-08-18 NOTE — NURSING NOTE
"Chief Complaint   Patient presents with     Physical       Initial BP 92/60  Pulse (!) 48  Temp 97.9  F (36.6  C) (Oral)  Resp 16  Ht 5' 4.25\" (1.632 m)  Wt 182 lb 1.6 oz (82.6 kg)  BMI 31.01 kg/m2 Estimated body mass index is 31.01 kg/(m^2) as calculated from the following:    Height as of this encounter: 5' 4.25\" (1.632 m).    Weight as of this encounter: 182 lb 1.6 oz (82.6 kg).  Medication Reconciliation: complete   Jennifer GUPTA, CMA,AAMA      "

## 2017-08-18 NOTE — PROGRESS NOTES
SUBJECTIVE:   Guillermo Ford is a 67 year old male who presents for Preventive Visit.      Are you in the first 12 months of your Medicare coverage?  Yes,  Visual Acuity:  Every two years   Physical   Annual:     Getting at least 3 servings of Calcium per day::  NO    Bi-annual eye exam::  Yes    Dental care twice a year::  Yes    Sleep apnea or symptoms of sleep apnea::  Sleep apnea    Diet::  Regular (no restrictions)    Frequency of exercise::  1 day/week    Duration of exercise::  Less than 15 minutes    Taking medications regularly::  Yes    Medication side effects::  None    Additional concerns today::  No      COGNITIVE SCREEN  1) Repeat 3 items (Banana, Sunrise, Chair)    2) Clock draw: NORMAL  3) 3 item recall: Recalls 2 objects   Results: NORMAL clock, 1-2 items recalled: COGNITIVE IMPAIRMENT LESS LIKELY    Mini-CogTM Copyright S Salty. Licensed by the author for use in [facility]; reprinted with permission (felicia@[facility]). All rights reserved.          Reviewed and updated as needed this visit by clinical staffTobacco  Allergies  Med Hx  Surg Hx  Fam Hx  Soc Hx        Reviewed and updated as needed this visit by Provider        Social History   Substance Use Topics     Smoking status: Former Smoker     Quit date: 4/27/2000     Smokeless tobacco: Never Used      Comment: previous 2 ppd smoker     Alcohol use No       The patient does not drink >3 drinks per day nor >7 drinks per week.          Diabetes Follow-up      Patient is checking blood sugars: not at all    Diabetic concerns: None     Symptoms of hypoglycemia (low blood sugar): none     Paresthesias (numbness or burning in feet) or sores: No     Date of last diabetic eye exam: 2016          Today's PHQ-2 Score:   PHQ-2 ( 1999 [employer]) 8/18/2017   Q1: Little interest or pleasure in doing things 0   Q2: Feeling down, depressed or hopeless 0   PHQ-2 Score 0   Q1: Little interest or pleasure in doing things -   Q2: Feeling down,  depressed or hopeless -   PHQ-2 Score -       Do you feel safe in your environment - Yes    Do you have a Health Care Directive?: Yes: Advance Directive has been received and scanned.    Current providers sharing in care for this patient include:   Patient Care Team:  Kvng Smith MD as PCP - General (Internal Medicine)      Hearing impairment: No    Ability to successfully perform activities of daily living: Yes, no assistance needed     Fall risk:  Fallen 2 or more times in the past year?: No  Any fall with injury in the past year?: No      Home safety:  none identified      The following health maintenance items are reviewed in Epic and correct as of today:  Health Maintenance   Topic Date Due     PNEUMOCOCCAL (1 of 2 - PCV13) 08/07/2017     AORTIC ANEURYSM SCREENING (SYSTEM ASSIGNED)  08/07/2017     FALL RISK ASSESSMENT  08/07/2017     INFLUENZA VACCINE (SYSTEM ASSIGNED)  09/01/2017     LIPID MONITORING Q1 YEAR  04/25/2018     TETANUS IMMUNIZATION (SYSTEM ASSIGNED)  07/15/2019     COLON CANCER SCREEN (SYSTEM ASSIGNED)  10/15/2019     ADVANCE DIRECTIVE PLANNING Q5 YRS  06/28/2021     HEPATITIS C SCREENING  Completed         Pneumonia Vaccine:Adults age 65+ who have not received previous Pneumovax (PPSV23) or PCV13 as an adult: Should first be given PCV13 AND then should be given PPSV23 6-12 months after PCV13    ROS:  C: NEGATIVE for fever, chills, change in weight  I: NEGATIVE for worrisome rashes, moles or lesions  E: NEGATIVE for vision changes or irritation  E/M: NEGATIVE for ear, mouth and throat problems  R: NEGATIVE for significant cough or SOB  B: NEGATIVE for masses, tenderness or discharge  CV: NEGATIVE for chest pain, palpitations or peripheral edema  GI: NEGATIVE for nausea, abdominal pain, heartburn, or change in bowel habits  : NEGATIVE for frequency, dysuria, or hematuria  M: NEGATIVE for significant arthralgias or myalgia  N: NEGATIVE for weakness, dizziness or paresthesias  E: NEGATIVE for  "temperature intolerance, skin/hair changes  H: NEGATIVE for bleeding problems  P: NEGATIVE for changes in mood or affect    OBJECTIVE:   BP 92/60  Pulse (!) 48  Temp 97.9  F (36.6  C) (Oral)  Resp 16  Ht 5' 4.25\" (1.632 m)  Wt 182 lb 1.6 oz (82.6 kg)  BMI 31.01 kg/m2 Estimated body mass index is 31.01 kg/(m^2) as calculated from the following:    Height as of this encounter: 5' 4.25\" (1.632 m).    Weight as of this encounter: 182 lb 1.6 oz (82.6 kg).  EXAM:   GENERAL: healthy, alert and no distress  EYES: Eyes grossly normal to inspection, PERRL and conjunctivae and sclerae normal  HENT: ear canals and TM's normal, nose and mouth without ulcers or lesions  NECK: no adenopathy, no asymmetry, masses, or scars and thyroid normal to palpation  RESP: lungs clear to auscultation - no rales, rhonchi or wheezes  CV: regular rate and rhythm, normal S1 S2, no S3 or S4, no murmur, click or rub, no peripheral edema and peripheral pulses strong  ABDOMEN: soft, nontender, no hepatosplenomegaly, no masses and bowel sounds normal  MS: no gross musculoskeletal defects noted, no edema  SKIN: no suspicious lesions or rashes  NEURO: Normal strength and tone, mentation intact and speech normal  PSYCH: mentation appears normal, affect normal/bright    ASSESSMENT / PLAN:   1. Impaired fasting glucose  Continue metformin.  No current diagnosis of diabetes mellitus.   - Hemoglobin A1c  - metFORMIN (GLUCOPHAGE) 500 MG tablet; Take 1 tablet (500 mg) by mouth daily (with breakfast)  Dispense: 90 tablet; Refill: 3    2. Erectile dysfunction, unspecified erectile dysfunction type  Refilled with generic sildenafil.   - sildenafil (REVATIO/VIAGRA) 20 MG tablet; Take 2 tablets (40 mg) by mouth daily as needed Never use with nitroglycerin, terazosin or doxazosin.  Dispense: 60 tablet; Refill: 11    3. Encounter for routine adult health examination without abnormal findings  Discussed diet, exercise, testicular self exam, blood pressure, " "cholesterol, and need for cancer surveillance at appropriate ages.   - PSA, screen  - Comprehensive metabolic panel (BMP + Alb, Alk Phos, ALT, AST, Total. Bili, TP)  - Pneumococcal vaccine 13 valent PCV13 IM (Prevnar) [35664]  - ADMIN: Vaccine, Initial (42535)  - Lipid panel reflex to direct LDL    4. Coronary artery disease involving native coronary artery of native heart without angina pectoris  Parameters well controlled.  Continue secondary risk factor modification for BP, cholesterol, anticoagulation, and smoking cessation.   - metoprolol (TOPROL XL) 100 MG 24 hr tablet; Take 1 tablet (100 mg) by mouth daily  Dispense: 90 tablet; Refill: 3  - lisinopril (PRINIVIL/ZESTRIL) 5 MG tablet; Take 1 tablet (5 mg) by mouth daily  Dispense: 90 tablet; Refill: 3    5. Need for pneumococcal vaccine    - ADMIN 1st VACCINE        COUNSELING:  Reviewed preventive health counseling, as reflected in patient instructions       Regular exercise       Healthy diet/nutrition       Vision screening       Hearing screening       Colon cancer screening       Prostate cancer screening        Estimated body mass index is 31.01 kg/(m^2) as calculated from the following:    Height as of this encounter: 5' 4.25\" (1.632 m).    Weight as of this encounter: 182 lb 1.6 oz (82.6 kg).  Weight management plan: Patient was referred to their PCP to discuss a diet and exercise plan.   reports that he quit smoking about 17 years ago. He has never used smokeless tobacco.        Appropriate preventive services were discussed with this patient, including applicable screening as appropriate for cardiovascular disease, diabetes, osteopenia/osteoporosis, and glaucoma.  As appropriate for age/gender, discussed screening for colorectal cancer, prostate cancer, breast cancer, and cervical cancer. Checklist reviewing preventive services available has been given to the patient.    Reviewed patients plan of care and provided an AVS. The Basic Care Plan (routine " screening as documented in Health Maintenance) for Guillermo meets the Care Plan requirement. This Care Plan has been established and reviewed with the Patient.    Counseling Resources:  ATP IV Guidelines  Pooled Cohorts Equation Calculator  Breast Cancer Risk Calculator  FRAX Risk Assessment  ICSI Preventive Guidelines  Dietary Guidelines for Americans, 2010  USDA's MyPlate  ASA Prophylaxis  Lung CA Screening    Kvng Smith MD  [facility] for HPI/ROS submitted by the patient on 8/15/2017   PHQ-2 Score: 0

## 2017-08-18 NOTE — MR AVS SNAPSHOT
"              After Visit Summary   8/18/2017    Guillermo Ford    MRN: [id number]           Patient Information     Date Of Birth          1950        Visit Information        Provider Department      8/18/2017 9:20 AM Kvng Smith MD [facility]        Today's Diagnoses     Encounter for routine adult health examination without abnormal findings    -  1    Impaired fasting glucose        Erectile dysfunction, unspecified erectile dysfunction type        Coronary artery disease involving native coronary artery of native heart without angina pectoris          Care Instructions    Lab work downstairs today.  Directions:  As you walk through the first floor, you'll see (on the right) first the pharmacy, then some bathrooms, then the \"Lab and Imaging\" area. Give them your name at the window there and wait for them to call you.     1 shot in the room.    Colonsocopy in 2019.    Another pneumonia shot next year.    Flu shot this fall.    Kvng Smith MD  Internal Medicine and Pediatrics     Preventive Health Recommendations:       Male Ages 65 and over    Yearly exam:             See your health care provider every year in order to  o   Review health changes.   o   Discuss preventive care.    o   Review your medicines if your doctor has prescribed any.    Talk with your health care provider about whether you should have a test to screen for prostate cancer (PSA).    Every 3 years, have a diabetes test (fasting glucose). If you are at risk for diabetes, you should have this test more often.    Every 5 years, have a cholesterol test. Have this test more often if you are at risk for high cholesterol or heart disease.     Every 10 years, have a colonoscopy. Or, have a yearly FIT test (stool test). These exams will check for colon cancer.    Talk to with your health care provider about screening for Abdominal Aortic Aneurysm if you have a family history of AAA or have a history of smoking.  Shots:     Get a " flu shot each year.     Get a tetanus shot every 10 years.     Talk to your doctor about your pneumonia vaccines. There are now two you should receive - Pneumovax (PPSV 23) and Prevnar (PCV 13).    Talk to your doctor about a shingles vaccine.     Talk to your doctor about the hepatitis B vaccine.  Nutrition:     Eat at least 5 servings of fruits and vegetables each day.     Eat whole-grain bread, whole-wheat pasta and brown rice instead of white grains and rice.     Talk to your doctor about Calcium and Vitamin D.   Lifestyle    Exercise for at least 150 minutes a week (30 minutes a day, 5 days a week). This will help you control your weight and prevent disease.     Limit alcohol to one drink per day.     No smoking.     Wear sunscreen to prevent skin cancer.     See your dentist every six months for an exam and cleaning.     See your eye doctor every 1 to 2 years to screen for conditions such as glaucoma, macular degeneration and cataracts.          Follow-ups after your visit        Your next 10 appointments already scheduled     Sep 29, 2017  8:00 AM CDT   LAB with [facility]   [facility] PHYSICIANS HEART AT [place] ([facility])    [address] [place] [id number] [phone]           Patient must bring picture ID. Patient should be prepared to give a urine specimen  Please do not eat 10-12 hours before your appointment if you are coming in fasting for labs on lipids, cholesterol, or glucose (sugar). Pregnant women should follow their Care Team instructions. Water with medications is okay. Do not drink coffee or other fluids. If you have concerns about taking  your medications, please ask at office or if scheduling via Murfie, send a message by clicking on Secure Messaging, Message Your Care Team.            Sep 29, 2017  8:30 AM CDT   Ech Stress Test with [id number]   [facility] CV Echocardiography (Cardiovascular Imaging at [facility])     [address]  [address]  [facility] [id number] [phone]           1. Please bring or wear a comfortable two-piece outfit and walking shoes. 2. Stop eating 3 hours before the test. You may drink water or juice. 3. Stop all caffeine 12 hours before the test. This includes coffee, tea, soda pop, chocolate and certain medicines (such as Anacin and Excederin). Also avoid decaf coffee and tea, as these contain small amounts of caffeine. 4. No alcohol, smoking or use of other tobacco products for 12 hours before the test. 5. Refer to your provider instructions to see if you need to stop any medications (such as beta-blockers or nitrates) for this test. 6. For patients with diabetes: - If you take insulin, call your diabetes care team. Ask if you should take a   dose the morning of your test. - If you take diabetes medicine by mouth, dont take it on the morning of your test. Bring it with you to take after the test. (If you have questions, call your diabetes care team) 7. When you arrive, please tell us if: - You have diabetes. - You have taken Viagra, Cialis or Levitra in the past 48 hours. 8. For any questions that cannot be answered, please contact the ordering physician            Oct 03, 2017  8:45 AM CDT   Return Visit with Hemal Quezada MD   [facility] PHYSICIANS [facility] AT [place] ([facility])    [address] [place] [address] [address]  [facility] [id number] [phone]              Who to contact     If you have questions or need follow up information about today's clinic visit or your schedule please contact [facility] directly at [phone].  Normal or non-critical lab and imaging results will be communicated to you by MyChart, letter or phone within 4 business days after the clinic has received the results. If you do not hear from us within 7 days, please contact the clinic through MyChart or phone. If you have a critical or abnormal lab result, we will notify you  "by phone as soon as possible.  Submit refill requests through [employer] or call your pharmacy and they will forward the refill request to us. Please allow 3 business days for your refill to be completed.          Additional Information About Your Visit        QuantHouse Information     [employer] gives you secure access to your electronic health record. If you see a primary care provider, you can also send messages to your care team and make appointments. If you have questions, please call your primary care clinic.  If you do not have a primary care provider, please call [phone] and they will assist you.        Care EveryWhere ID     This is your Care EveryWhere ID. This could be used by other organizations to access your [place] medical records  [id number]        Your Vitals Were     Pulse Temperature Respirations Height BMI (Body Mass Index)       48 97.9  F (36.6  C) (Oral) 16 5' 4.25\" (1.632 m) 31.01 kg/m2        Blood Pressure from Last 3 Encounters:   08/18/17 92/60   04/26/17 118/68   04/25/17 120/60    Weight from Last 3 Encounters:   08/18/17 182 lb 1.6 oz (82.6 kg)   04/26/17 184 lb (83.5 kg)   04/25/17 185 lb (83.9 kg)              We Performed the Following     ADMIN: Vaccine, Initial (48752)     Comprehensive metabolic panel (BMP + Alb, Alk Phos, ALT, AST, Total. Bili, TP)     Hemoglobin A1c     Lipid panel reflex to direct LDL     Pneumococcal vaccine 13 valent PCV13 IM (Prevnar) [24221]     PSA, screen          Today's Medication Changes          These changes are accurate as of: 8/18/17 10:08 AM.  If you have any questions, ask your nurse or doctor.               Start taking these medicines.        Dose/Directions    sildenafil 20 MG tablet   Commonly known as:  REVATIO/VIAGRA   Used for:  Erectile dysfunction, unspecified erectile dysfunction type   Started by:  Kvng Smith MD        Dose:  40 mg   Take 2 tablets (40 mg) by mouth daily as needed Never use with nitroglycerin, terazosin or " doxazosin.   Quantity:  60 tablet   Refills:  11         These medicines have changed or have updated prescriptions.        Dose/Directions    metFORMIN 500 MG tablet   Commonly known as:  GLUCOPHAGE   This may have changed:  See the new instructions.   Used for:  Impaired fasting glucose   Changed by:  Kvng Smith MD        Dose:  500 mg   Take 1 tablet (500 mg) by mouth daily (with breakfast)   Quantity:  90 tablet   Refills:  3         Stop taking these medicines if you haven't already. Please contact your care team if you have questions.     sildenafil 50 MG cap/tab   Commonly known as:  VIAGRA   Stopped by:  Kvng Smith MD                Where to get your medicines      These medications were sent to [id number] - [place], [place] - [address]  [address], [place] [address]     Phone:  [phone]     lisinopril 5 MG tablet    metFORMIN 500 MG tablet    metoprolol 100 MG 24 hr tablet         Call your pharmacy to confirm that your medication is ready for pickup. It may take up to 24 hours for them to receive the prescription. If the prescription is not ready within 3 business days, please contact your clinic or your provider.     We will let you know when these medications are ready. If you don't hear back within 3 business days, please contact us.     sildenafil 20 MG tablet                Primary Care Provider Office Phone # Fax #    Kvng Simth [phone]       [facility] [place] [address]        Equal Access to Services     [facility] AH: Hadii yanci león hadasho Soluz, waaxda luqadaha, qaybta kaalmada darius, kin miller. So [facility] [phone].    ATENCIÓN: Si habla español, tiene a fenton disposición servicios gratuitos de asistencia lingüística. Lester al [phone].    We comply with applicable federal civil rights laws and [place] laws. We do not discriminate on the basis of race, color, national origin, age,  disability sex, sexual orientation or gender identity.            Thank you!     Thank you for choosing [facility]  for your care. Our goal is always to provide you with excellent care. Hearing back from our patients is one way we can continue to improve our services. Please take a few minutes to complete the written survey that you may receive in the mail after your visit with us. Thank you!             Your Updated Medication List - Protect others around you: Learn how to safely use, store and throw away your medicines at www.disposemymeds.org.          This list is accurate as of: 8/18/17 10:08 AM.  Always use your most recent med list.                   Brand Name Dispense Instructions for use Diagnosis    aspirin 81 MG tablet      Take 81 mg by mouth Takes every other day.        clopidogrel 75 MG tablet    PLAVIX    90 tablet    Take 1 tablet (75 mg) by mouth daily    Coronary artery disease involving native coronary artery without angina pectoris       ezetimibe-simvastatin 10-20 MG per tablet    VYTORIN    90 tablet    Take 1 tablet by mouth At Bedtime    Mixed hyperlipidemia       lisinopril 5 MG tablet    PRINIVIL/ZESTRIL    90 tablet    Take 1 tablet (5 mg) by mouth daily    Coronary artery disease involving native coronary artery of native heart without angina pectoris       metFORMIN 500 MG tablet    GLUCOPHAGE    90 tablet    Take 1 tablet (500 mg) by mouth daily (with breakfast)    Impaired fasting glucose       metoprolol 100 MG 24 hr tablet    TOPROL XL    90 tablet    Take 1 tablet (100 mg) by mouth daily    Coronary artery disease involving native coronary artery of native heart without angina pectoris       nitroGLYcerin 0.4 MG sublingual tablet    NITROSTAT    25 tablet    Place 1 tablet (0.4 mg) under the tongue every 5 minutes as needed for chest pain May repeat X 2. If no relief after 3 tablets call 911    Coronary artery disease involving native coronary artery of native heart  without angina pectoris       sildenafil 20 MG tablet    REVATIO/VIAGRA    60 tablet    Take 2 tablets (40 mg) by mouth daily as needed Never use with nitroglycerin, terazosin or doxazosin.    Erectile dysfunction, unspecified erectile dysfunction type

## 2017-08-18 NOTE — PATIENT INSTRUCTIONS
"Lab work downstairs today.  Directions:  As you walk through the first floor, you'll see (on the right) first the pharmacy, then some bathrooms, then the \"Lab and Imaging\" area. Give them your name at the window there and wait for them to call you.     1 shot in the room.    Colonsocopy in 2019.    Another pneumonia shot next year.    Flu shot this fall.    Kvng Smith MD  Internal Medicine and Pediatrics     Preventive Health Recommendations:       Male Ages 65 and over    Yearly exam:             See your health care provider every year in order to  o   Review health changes.   o   Discuss preventive care.    o   Review your medicines if your doctor has prescribed any.    Talk with your health care provider about whether you should have a test to screen for prostate cancer (PSA).    Every 3 years, have a diabetes test (fasting glucose). If you are at risk for diabetes, you should have this test more often.    Every 5 years, have a cholesterol test. Have this test more often if you are at risk for high cholesterol or heart disease.     Every 10 years, have a colonoscopy. Or, have a yearly FIT test (stool test). These exams will check for colon cancer.    Talk to with your health care provider about screening for Abdominal Aortic Aneurysm if you have a family history of AAA or have a history of smoking.  Shots:     Get a flu shot each year.     Get a tetanus shot every 10 years.     Talk to your doctor about your pneumonia vaccines. There are now two you should receive - Pneumovax (PPSV 23) and Prevnar (PCV 13).    Talk to your doctor about a shingles vaccine.     Talk to your doctor about the hepatitis B vaccine.  Nutrition:     Eat at least 5 servings of fruits and vegetables each day.     Eat whole-grain bread, whole-wheat pasta and brown rice instead of white grains and rice.     Talk to your doctor about Calcium and Vitamin D.   Lifestyle    Exercise for at least 150 minutes a week (30 minutes a day, 5 days a " week). This will help you control your weight and prevent disease.     Limit alcohol to one drink per day.     No smoking.     Wear sunscreen to prevent skin cancer.     See your dentist every six months for an exam and cleaning.     See your eye doctor every 1 to 2 years to screen for conditions such as glaucoma, macular degeneration and cataracts.

## 2017-08-24 NOTE — TELEPHONE ENCOUNTER
Notification received stating they will not cover this medication. No PA was submitted (from what I can see).     Per Dr. Smith, pt will have to pay out of pocket.    Toya Altamirano MA   August 24, 2017,  4:18 PM

## 2017-09-20 ENCOUNTER — PRE VISIT (OUTPATIENT)
Dept: CARDIOLOGY | Facility: CLINIC | Age: 67
End: 2017-09-20

## 2017-09-20 DIAGNOSIS — I25.10 CORONARY ARTERY DISEASE INVOLVING NATIVE CORONARY ARTERY OF NATIVE HEART WITHOUT ANGINA PECTORIS: ICD-10-CM

## 2017-09-29 ENCOUNTER — HOSPITAL ENCOUNTER (OUTPATIENT)
Dept: CARDIOLOGY | Facility: CLINIC | Age: 67
Discharge: HOME | End: 2017-09-29
Attending: INTERNAL MEDICINE | Admitting: INTERNAL MEDICINE
Payer: COMMERCIAL

## 2017-09-29 DIAGNOSIS — E78.2 MIXED HYPERLIPIDEMIA: ICD-10-CM

## 2017-09-29 DIAGNOSIS — I25.10 CORONARY ARTERY DISEASE INVOLVING NATIVE CORONARY ARTERY OF NATIVE HEART WITHOUT ANGINA PECTORIS: ICD-10-CM

## 2017-09-29 PROCEDURE — 93016 CV STRESS TEST SUPVJ ONLY: CPT | Performed by: INTERNAL MEDICINE

## 2017-09-29 PROCEDURE — 93018 CV STRESS TEST I&R ONLY: CPT | Performed by: INTERNAL MEDICINE

## 2017-09-29 PROCEDURE — 25500064 ZZH RX 255 OP 636: Performed by: INTERNAL MEDICINE

## 2017-09-29 PROCEDURE — 93321 DOPPLER ECHO F-UP/LMTD STD: CPT | Mod: 26 | Performed by: INTERNAL MEDICINE

## 2017-09-29 PROCEDURE — 93325 DOPPLER ECHO COLOR FLOW MAPG: CPT | Mod: 26 | Performed by: INTERNAL MEDICINE

## 2017-09-29 PROCEDURE — 93350 STRESS TTE ONLY: CPT | Mod: 26 | Performed by: INTERNAL MEDICINE

## 2017-09-29 PROCEDURE — 40000264 ECHO STRESS TEST WITH LUMASON

## 2017-09-29 RX ADMIN — SULFUR HEXAFLUORIDE 5 ML: KIT at 09:36

## 2017-10-03 ENCOUNTER — OFFICE VISIT (OUTPATIENT)
Dept: CARDIOLOGY | Facility: CLINIC | Age: 67
End: 2017-10-03
Attending: INTERNAL MEDICINE
Payer: COMMERCIAL

## 2017-10-03 VITALS
HEIGHT: 64 IN | BODY MASS INDEX: 31.34 KG/M2 | HEART RATE: 53 BPM | SYSTOLIC BLOOD PRESSURE: 153 MMHG | DIASTOLIC BLOOD PRESSURE: 80 MMHG | WEIGHT: 183.6 LBS

## 2017-10-03 DIAGNOSIS — I25.10 CORONARY ARTERY DISEASE INVOLVING NATIVE CORONARY ARTERY OF NATIVE HEART WITHOUT ANGINA PECTORIS: ICD-10-CM

## 2017-10-03 DIAGNOSIS — E78.2 MIXED HYPERLIPIDEMIA: ICD-10-CM

## 2017-10-03 PROCEDURE — 99214 OFFICE O/P EST MOD 30 MIN: CPT | Performed by: INTERNAL MEDICINE

## 2017-10-03 NOTE — PROGRESS NOTES
HISTORY OF PRESENT ILLNESS:  The patient is a 67-year-old overweight white male hospitalized at [facility] in 2005 with evidence of an inferior wall myocardial infarction after being transferred from [facility].  Cardiac catheterization and coronary angiography revealed severe stenosis in the distal right coronary artery involving the PDA treated with PTCA and stent placement with an old angioplasty stent noted in the ostium of the posterior descending coronary artery.  There was previous hypokinesis of the inferior wall.  Ejection fraction at that time was 35%-40%.        The patient underwent a recent stress echocardiogram with contrast which was essentially normal with a moderate high workload, going 10 minutes on Maksim protocol, not achieving target heart rate because of beta blocker therapy.  Images were somewhat foreshortened but overall there was no significant wall motion abnormality and left ventricular ejection fraction was within normal limits.  There was a right bundle branch block pattern on his electrocardiogram.        He has denied symptoms of chest discomfort, shortness of breath, dizziness, palpitations, nausea, vomiting, diaphoresis or syncope.  He denies PND, orthopnea, fevers, chills or sweats.      MEDICATIONS:   1.  Metformin 500 mg a day with breakfast.   2.  Metoprolol  mg a day.   3.  Lisinopril 5 mg a day.   4.  Viagra as needed.   5.  Clopidogrel 75 mg a day.     6.  Ezetimibe/simvastatin 10/20 mg a day.   7.  Nitroglycerin 0.4 mg sublingual p.r.n., which has not been used.   8.  Aspirin 81 mg a day.      LABORATORY DATA:  Most recent laboratory data demonstrated a cholesterol 139, HDL 57 and LDL 44, triglyceride 188.  Sodium 139, potassium 4.1, BUN 12, creatinine 0.85.  Hemoglobin A1c is 5.9.  ALT was 34.      The patient presents to Cardiology Clinic for followup of his ischemic heart disease.  He participates in activities without significant  restriction and is tolerating his medications, although he does note significant fatigue when he takes his medications in the evening.      PHYSICAL EXAMINATION:   VITAL SIGNS:  Blood pressure 153/80 with a repeat 143/78 and heart rate of 50-60 and regular.  Weight was 183 pounds, which is stable.   NECK:  Without jugular venous distention, carotid bruit or palpable thyroid.   CHEST:  Essentially clear to percussion and auscultation, with slight decreased breath sounds at the bases.   CARDIAC:  Regular rhythm, soft S4 gallop, 1-2/6 systolic murmur at the left sternal border with minimal radiation.  No diastolic murmur, rub or S3.   EXTREMITIES:  Without cyanosis or edema.      CLINICAL IMPRESSION:   1.  Stable cardiac condition.   2.  History of ischemic heart disease, status post inferior wall myocardial infarction with PTCA and stent of proximal posterior descending branch of the right coronary artery in 2005.   3.  History of hyperlipidemia with suggestion of metabolic syndrome at goal, but with hypertriglyceridemia.   4.  Hypertension, ?new onset with elevated systolic blood pressure.   5.  Sleep apnea.   6.  Gastroesophageal reflux disease.   7.  Gout.   8.  Overweight.   9.  Type 2 diabetes mellitus, non-insulin dependent.      DISCUSSION:  The patient has done reasonably well from a cardiac viewpoint.  He unfortunately has been unable to lose any weight, but has had no symptoms of angina pectoris or congestive heart failure.  His systolic blood pressure is somewhat elevated, but he has tended to take his medications in the evening and there may be some decreased effect the next morning.  He will readjust his medications to taking it in the morning and may need further adjustment, either to a b.i.d. dosing, i.e., 50 mg of metoprolol XL b.i.d. and lisinopril 5 mg b.i.d. if negative side effects or blood pressure remains elevated.  He will need close followup of his serum lipids, basic metabolic panel and blood  pressure.  His stress echocardiogram does not show evidence of significant ischemia, and the patient remains asymptomatic.  Overall, he appears to be doing reasonably well, except for the appearance of a tendency towards higher blood pressure.  He does have a moderate amount of caffeine and salt intake.      RECOMMENDATIONS:   1.  Continue present medications, taking metoprolol and lisinopril in the morning, may need adjustment to b.i.d. dosing or slight increase of lisinopril to 10 mg a day.   2.  Diet and exercise program to lose weight, avoiding caffeine and salt.   3.  Aggressive diabetic management.   4.  Close followup of serum lipids, basic metabolic panel and blood pressure with followup with Sebastian Espana in 4-6 weeks.   5.  Routine medical followup.   6.  Cardiology followup up in 4 months.      cc:      [place] [address]         ANGEL[facility] RAMON MD, [facility]             D: 10/03/2017 10:07   T: 10/03/2017 12:14   MT: EDMOND      Name:     LOLA GRIFFIN   MRN:      [id number]        Account:      [id number]   :      1950           Service Date: 10/03/2017      Document: [id number]

## 2017-10-03 NOTE — LETTER
10/3/2017    Kvng Smith MD  [address] [place] [address]    RE: Guillermo Ford       Dear Colleague,    I had the pleasure of seeing Guillermo Ford in the [facility].    The patient is a 67-year-old overweight white male hospitalized at [facility] in 2005 with evidence of an inferior wall myocardial infarction after being transferred from [facility].  Cardiac catheterization and coronary angiography revealed severe stenosis in the distal right coronary artery involving the PDA treated with PTCA and stent placement with an old angioplasty stent noted in the ostium of the posterior descending coronary artery.  There was previous hypokinesis of the inferior wall.  Ejection fraction at that time was 35%-40%.        The patient underwent a recent stress echocardiogram with contrast which was essentially normal with a moderate high workload, going 10 minutes on Maksim protocol, not achieving target heart rate because of beta blocker therapy.  Images were somewhat foreshortened but overall there was no significant wall motion abnormality and left ventricular ejection fraction was within normal limits.  There was a right bundle branch block pattern on his electrocardiogram.        He has denied symptoms of chest discomfort, shortness of breath, dizziness, palpitations, nausea, vomiting, diaphoresis or syncope.  He denies PND, orthopnea, fevers, chills or sweats.      MEDICATIONS:   1.  Metformin 500 mg a day with breakfast.   2.  Metoprolol  mg a day.   3.  Lisinopril 5 mg a day.   4.  Viagra as needed.   5.  Clopidogrel 75 mg a day.     6.  Ezetimibe/simvastatin 10/20 mg a day.   7.  Nitroglycerin 0.4 mg sublingual p.r.n., which has not been used.   8.  Aspirin 81 mg a day.      LABORATORY DATA:  Most recent laboratory data demonstrated a cholesterol 139, HDL 57 and LDL 44, triglyceride 188.  Sodium 139, potassium 4.1, BUN 12, creatinine  0.85.  Hemoglobin A1c is 5.9.  ALT was 34.      The patient presents to Cardiology Clinic for followup of his ischemic heart disease.  He participates in activities without significant restriction and is tolerating his medications, although he does note significant fatigue when he takes his medications in the evening.      PHYSICAL EXAMINATION:   VITAL SIGNS:  Blood pressure 153/80 with a repeat 143/78 and heart rate of 50-60 and regular.  Weight was 183 pounds, which is stable.   NECK:  Without jugular venous distention, carotid bruit or palpable thyroid.   CHEST:  Essentially clear to percussion and auscultation, with slight decreased breath sounds at the bases.   CARDIAC:  Regular rhythm, soft S4 gallop, 1-2/6 systolic murmur at the left sternal border with minimal radiation.  No diastolic murmur, rub or S3.   EXTREMITIES:  Without cyanosis or edema.      CLINICAL IMPRESSION:   1.  Stable cardiac condition.   2.  History of ischemic heart disease, status post inferior wall myocardial infarction with PTCA and stent of proximal posterior descending branch of the right coronary artery in 2005.   3.  History of hyperlipidemia with suggestion of metabolic syndrome at goal, but with hypertriglyceridemia.   4.  Hypertension, ?new onset with elevated systolic blood pressure.   5.  Sleep apnea.   6.  Gastroesophageal reflux disease.   7.  Gout.   8.  Overweight.   9.  Type 2 diabetes mellitus, non-insulin dependent.      DISCUSSION:  The patient has done reasonably well from a cardiac viewpoint.  He unfortunately has been unable to lose any weight, but has had no symptoms of angina pectoris or congestive heart failure.  His systolic blood pressure is somewhat elevated, but he has tended to take his medications in the evening and there may be some decreased effect the next morning.  He will readjust his medications to taking it in the morning and may need further adjustment, either to a b.i.d. dosing, i.e., 50 mg of  metoprolol XL b.i.d. and lisinopril 5 mg b.i.d. if negative side effects or blood pressure remains elevated.  He will need close followup of his serum lipids, basic metabolic panel and blood pressure.  His stress echocardiogram does not show evidence of significant ischemia, and the patient remains asymptomatic.  Overall, he appears to be doing reasonably well, except for the appearance of a tendency towards higher blood pressure.  He does have a moderate amount of caffeine and salt intake.      RECOMMENDATIONS:   1.  Continue present medications, taking metoprolol and lisinopril in the morning, may need adjustment to b.i.d. dosing or slight increase of lisinopril to 10 mg a day.   2.  Diet and exercise program to lose weight, avoiding caffeine and salt.   3.  Aggressive diabetic management.   4.  Close followup of serum lipids, basic metabolic panel and blood pressure with followup with Sebastian Espana in 4-6 weeks.   5.  Routine medical followup.   6.  Cardiology followup up in 4 months.        Again, thank you for allowing me to participate in the care of your patient.      Sincerely,    Hemal Quezada MD     [facility]

## 2017-10-03 NOTE — MR AVS SNAPSHOT
After Visit Summary   10/3/2017    Guillermo Ford    MRN: [id number]           Patient Information     Date Of Birth          1950        Visit Information        Provider Department      10/3/2017 8:45 AM Hemal Quezada MD [facility]        Today's Diagnoses     Coronary artery disease involving native coronary artery of native heart without angina pectoris        Mixed hyperlipidemia           Follow-ups after your visit        Additional Services     Follow-Up with Cardiac Advanced Practice Provider           Follow-Up with Cardiologist                 Future tests that were ordered for you today     Open Future Orders        Priority Expected Expires Ordered    Basic metabolic panel Routine 1/31/2018 10/3/2018 10/3/2017    Lipid Profile Routine 1/31/2018 10/3/2018 10/3/2017    ALT Routine 1/31/2018 10/3/2018 10/3/2017    Follow-Up with Cardiologist Routine 1/31/2018 10/3/2018 10/3/2017    Follow-Up with Cardiac Advanced Practice Provider Routine 11/2/2017 10/3/2018 10/3/2017            Who to contact     If you have questions or need follow up information about today's clinic visit or your schedule please contact [facility] directly at [phone].  Normal or non-critical lab and imaging results will be communicated to you by MyChart, letter or phone within 4 business days after the clinic has received the results. If you do not hear from us within 7 days, please contact the clinic through MyChart or phone. If you have a critical or abnormal lab result, we will notify you by phone as soon as possible.  Submit refill requests through [employer] or call your pharmacy and they will forward the refill request to us. Please allow 3 business days for your refill to be completed.          Additional Information About Your Visit        MyChart Information     [employer] gives you secure access to your electronic  "health record. If you see a primary care provider, you can also send messages to your care team and make appointments. If you have questions, please call your primary care clinic.  If you do not have a primary care provider, please call [phone] and they will assist you.        Care EveryWhere ID     This is your Care EveryWhere ID. This could be used by other organizations to access your [place] medical records  [id number]        Your Vitals Were     Pulse Height BMI (Body Mass Index)             53 1.632 m (5' 4.25\") 31.27 kg/m2          Blood Pressure from Last 3 Encounters:   10/03/17 153/80   08/18/17 92/60   04/26/17 118/68    Weight from Last 3 Encounters:   10/03/17 83.3 kg (183 lb 9.6 oz)   08/18/17 82.6 kg (182 lb 1.6 oz)   04/26/17 83.5 kg (184 lb)              We Performed the Following     Follow-Up with Cardiologist        Primary Care Provider Office Phone # Fax #    Kvng Smith [phone] [address] [place] [address]        Equal Access to Services     [facility]: Hadii aad ku muralio Soluz, waaxda luqadaha, qaybta kaalmada darius, kin pham . So [facility] [phone].    ATENCIÓN: Si habla español, tiene a fenton disposición servicios gratuitos de asistencia lingüística. Llame al [phone].    We comply with applicable federal civil rights laws and [place] laws. We do not discriminate on the basis of race, color, national origin, age, disability, sex, sexual orientation, or gender identity.            Thank you!     Thank you for choosing [facility] [place]  for your care. Our goal is always to provide you with excellent care. Hearing back from our patients is one way we can continue to improve our services. Please take a few minutes to complete the written survey that you may receive in the mail after your visit with us. Thank you!             Your Updated Medication List - " Protect others around you: Learn how to safely use, store and throw away your medicines at www.disposemymeds.org.          This list is accurate as of: 10/3/17  9:55 AM.  Always use your most recent med list.                   Brand Name Dispense Instructions for use Diagnosis    aspirin 81 MG tablet      Take 81 mg by mouth Takes every other day.        clopidogrel 75 MG tablet    PLAVIX    90 tablet    Take 1 tablet (75 mg) by mouth daily    Coronary artery disease involving native coronary artery without angina pectoris       ezetimibe-simvastatin 10-20 MG per tablet    VYTORIN    90 tablet    Take 1 tablet by mouth At Bedtime    Mixed hyperlipidemia       lisinopril 5 MG tablet    PRINIVIL/ZESTRIL    90 tablet    Take 1 tablet (5 mg) by mouth daily    Coronary artery disease involving native coronary artery of native heart without angina pectoris       metFORMIN 500 MG tablet    GLUCOPHAGE    90 tablet    Take 1 tablet (500 mg) by mouth daily (with breakfast)    Impaired fasting glucose       metoprolol 100 MG 24 hr tablet    TOPROL XL    90 tablet    Take 1 tablet (100 mg) by mouth daily    Coronary artery disease involving native coronary artery of native heart without angina pectoris       nitroGLYcerin 0.4 MG sublingual tablet    NITROSTAT    25 tablet    Place 1 tablet (0.4 mg) under the tongue every 5 minutes as needed for chest pain May repeat X 2. If no relief after 3 tablets call 911    Coronary artery disease involving native coronary artery of native heart without angina pectoris       sildenafil 20 MG tablet    REVATIO    60 tablet    Take 2 tablets (40 mg) by mouth daily as needed Never use with nitroglycerin, terazosin or doxazosin.    Erectile dysfunction, unspecified erectile dysfunction type

## 2017-11-17 ENCOUNTER — OFFICE VISIT (OUTPATIENT)
Dept: CARDIOLOGY | Facility: CLINIC | Age: 67
End: 2017-11-17
Attending: INTERNAL MEDICINE
Payer: COMMERCIAL

## 2017-11-17 VITALS
BODY MASS INDEX: 30.56 KG/M2 | HEART RATE: 50 BPM | WEIGHT: 179 LBS | SYSTOLIC BLOOD PRESSURE: 120 MMHG | DIASTOLIC BLOOD PRESSURE: 78 MMHG | HEIGHT: 64 IN

## 2017-11-17 DIAGNOSIS — R03.0 ELEVATED BLOOD PRESSURE READING WITHOUT DIAGNOSIS OF HYPERTENSION: Primary | ICD-10-CM

## 2017-11-17 DIAGNOSIS — I25.10 CORONARY ARTERY DISEASE INVOLVING NATIVE CORONARY ARTERY OF NATIVE HEART WITHOUT ANGINA PECTORIS: ICD-10-CM

## 2017-11-17 DIAGNOSIS — E78.2 MIXED HYPERLIPIDEMIA: ICD-10-CM

## 2017-11-17 PROCEDURE — 99214 OFFICE O/P EST MOD 30 MIN: CPT | Performed by: PHYSICIAN ASSISTANT

## 2017-11-17 NOTE — LETTER
11/17/2017    Kvng Smith MD  [address] [place] [address]    RE: Guillermo Ford       Dear Colleague,    I had the pleasure of seeing Guillermo Ford in the [facility].  This is a pleasant 67-year-old gentleman who presents to cardiology clinic for blood pressure evaluation.  His past medical history is notable for coronary artery disease (inferior MI receiving stenting to proximal PDA a in 2005 (, hyperlipidemia, hypertriglyceridemia, recent onset of elevated blood pressure, obstructive sleep apnea, type 2 diabetes mellitus (non-insulin-dependent), GERD, gout, and overweight.    He was seen by Dr. Quezada a month ago and his blood pressure was noted to be high.  His antihypertensive medications were moved from evening time to morning time for more daytime coverage.  He was set up to follow-up with me in one month.  He also had a stress echocardiogram at that time, demonstrating no ischemia.    The patient returns to clinic today stating that he's doing well.  He denies chest discomfort, shortness of breath, peripheral edema, vision changes, or headache.  He is taking metoprolol and lisinopril early in the morning.  He has not checked his blood pressure at home.  He has no other questions or concerns at this time.    Physical examination:  General-NAD, overweight  Neck-no JVD in upright position  Chest-clear to auscultation bilaterally  Cardiac-regular rate and rhythm with 1/6 systolic murmur auscultated over aortic area  Extremities-no edema    Assessment and plan:  This is a pleasant 67-year-old gentleman who presents to cardiology clinic for blood pressure evaluation.  The initial check was slightly elevated at 136/78.  I rechecked this myself and it came down to 120/78.  At this time will continue with current medication regimen.  Patient does have a blood pressure machine at home.  I encouraged him to use his machine to check his blood pressure in the mid morning  as well as in the mid afternoon.  If his afternoon blood pressures are high (greater than 130/85) consistently, we will increase his lisinopril to 5 mg 2 times daily.  I provided my nurse team contact information.  Patient will call us in 2 weeks.    In the past has had low blood pressures.  He denies any recent NSAID use.  Denies any symptoms.  His weight has been fairly stable.    Thank you for allowing me to participate in the care of this delightful patient today.        This note was completed in part using Dragon voice recognition software. Although reviewed after completion, some word and grammatical errors may occur.    Orders this Visit:  No orders of the defined types were placed in this encounter.    No orders of the defined types were placed in this encounter.    There are no discontinued medications.      Encounter Diagnoses   Name Primary?     Coronary artery disease involving native coronary artery of native heart without angina pectoris      Mixed hyperlipidemia      Elevated blood pressure reading without diagnosis of hypertension Yes       CURRENT MEDICATIONS:  Current Outpatient Prescriptions   Medication Sig Dispense Refill     metFORMIN (GLUCOPHAGE) 500 MG tablet Take 1 tablet (500 mg) by mouth daily (with breakfast) 90 tablet 3     metoprolol (TOPROL XL) 100 MG 24 hr tablet Take 1 tablet (100 mg) by mouth daily 90 tablet 3     lisinopril (PRINIVIL/ZESTRIL) 5 MG tablet Take 1 tablet (5 mg) by mouth daily 90 tablet 3     sildenafil (REVATIO/VIAGRA) 20 MG tablet Take 2 tablets (40 mg) by mouth daily as needed Never use with nitroglycerin, terazosin or doxazosin. 60 tablet 11     clopidogrel (PLAVIX) 75 MG tablet Take 1 tablet (75 mg) by mouth daily 90 tablet 3     ezetimibe-simvastatin (VYTORIN) 10-20 MG per tablet Take 1 tablet by mouth At Bedtime 90 tablet 3     nitroglycerin (NITROSTAT) 0.4 MG sublingual tablet Place 1 tablet (0.4 mg) under the tongue every 5 minutes as needed for chest pain  "May repeat X 2. If no relief after 3 tablets call 911 25 tablet 2     aspirin 81 MG tablet Take 81 mg by mouth daily          ALLERGIES   No Known Allergies    PAST MEDICAL HISTORY:  Past Medical History:   Diagnosis Date     Alcohol Dependence in Remission     sober since 1996     CAD (coronary artery disease)     cardiac cath 2005: stent to PDA     Diverticulitis of colon 6/9/2008    Diagnosed after diarrheal episode from colchicine; \"Probable\" on CT scan 6/08.     GERD (gastroesophageal reflux disease)      Gout      History of acute inferior wall MI     2005     Left ventricular diastolic dysfunction      Mixed hyperlipidemia      Sleep apnea     C PAP       PAST SURGICAL HISTORY:  Past Surgical History:   Procedure Laterality Date     HC CORONARY STENT PERCUT, INITIAL VESSEL  4/18/2005    PTCA with intracoronary stent placement of, distal RCA and ostial PDA     HC REPAIR OF NASAL SEPTUM      Septoplasty and uvulectomy       FAMILY HISTORY:  Family History   Problem Relation Age of Onset     C.A.D. Mother      C.A.D. Father      DIABETES Brother      unsure of type 1/2     Cancer - colorectal No family hx of      Prostate Cancer No family hx of        SOCIAL HISTORY:  Social History     Social History     Marital status:      Spouse name: N/A     Number of children: N/A     Years of education: N/A     Social History Main Topics     Smoking status: Former Smoker     Quit date: 4/27/2000     Smokeless tobacco: Never Used      Comment: previous 2 ppd smoker     Alcohol use No     Drug use: No     Sexual activity: Yes     Partners: Female     Other Topics Concern     Parent/Sibling W/ Cabg, Mi Or Angioplasty Before 65f 55m? No     Caffeine Concern No     coffee 1/2 decaf- 4 cups a day     Sleep Concern No     Stress Concern No     Weight Concern No     Special Diet No     Exercise Yes     tredmill 1-2 days a week     Seat Belt Yes     Social History Narrative       Review of Systems:  Skin:  Negative   " "  Eyes:  Negative    ENT:  Negative    Respiratory:  Negative    Cardiovascular:  Negative    Gastroenterology: Negative    Genitourinary:  not assessed    Musculoskeletal:  Positive for arthritis  Neurologic:  Negative    Psychiatric:  Negative    Heme/Lymph/Imm:  Negative    Endocrine:  Positive for diabetes    Physical Exam:  Vitals: /78  Pulse 50  Ht 1.632 m (5' 4.25\")  Wt 81.2 kg (179 lb)  BMI 30.49 kg/m2   Please refer to dictation for physical exam    Recent Lab Results:  LIPID RESULTS:  Lab Results   Component Value Date    CHOL 139 08/18/2017    HDL 57 08/18/2017    LDL 44 08/18/2017    TRIG 188 (H) 08/18/2017    CHOLHDLRATIO 2.6 10/08/2015       LIVER ENZYME RESULTS:  Lab Results   Component Value Date    AST 22 08/18/2017    ALT 34 08/18/2017       CBC RESULTS:  Lab Results   Component Value Date    WBC 8.3 06/01/2008    RBC 5.08 06/01/2008    HGB 14.6 06/01/2008    HCT 44.2 06/01/2008    MCV 87 06/01/2008    MCH 28.7 06/01/2008    MCHC 33.0 06/01/2008    RDW 13.9 06/01/2008     06/01/2008       BMP RESULTS:  Lab Results   Component Value Date     08/18/2017    POTASSIUM 4.1 08/18/2017    CHLORIDE 106 08/18/2017    CO2 27 08/18/2017    ANIONGAP 6 08/18/2017     (H) 08/18/2017    BUN 12 08/18/2017    CR 0.85 08/18/2017    GFRESTIMATED >90 08/18/2017    GFRESTBLACK >90 08/18/2017    RADHA 8.7 08/18/2017        A1C RESULTS:  Lab Results   Component Value Date    A1C 5.9 08/18/2017       INR RESULTS:  Lab Results   Component Value Date    INR 0.91 04/18/2005   Thank you for allowing me to participate in the care of your patient.    Sincerely,     Sebastian Peña PA-C     [facility]    "

## 2017-11-17 NOTE — PATIENT INSTRUCTIONS
Today's Plan:   1) Your blood pressure is better today.   2) Check your blood pressure daily at home and let us know if your blood pressure is above 130/80 consistently.   3) Follow up with Dr. Quezada in January.    If you have questions or concerns please call my nurse at [phone].     Scheduling phone number: [phone]  Reminder: Please bring in all current medications, over the counter supplements and vitamin bottles to your next appointment.    It was a pleasure seeing you today!     Sebastian Peña  11/17/2017

## 2017-11-17 NOTE — PROGRESS NOTES
History of Present Illness:   This is a pleasant 67-year-old gentleman who presents to cardiology clinic for blood pressure evaluation.  His past medical history is notable for coronary artery disease (inferior MI receiving stenting to proximal PDA a in 2005 (, hyperlipidemia, hypertriglyceridemia, recent onset of elevated blood pressure, obstructive sleep apnea, type 2 diabetes mellitus (non-insulin-dependent), GERD, gout, and overweight.    He was seen by Dr. Quezada a month ago and his blood pressure was noted to be high.  His antihypertensive medications were moved from evening time to morning time for more daytime coverage.  He was set up to follow-up with me in one month.  He also had a stress echocardiogram at that time, demonstrating no ischemia.    The patient returns to clinic today stating that he's doing well.  He denies chest discomfort, shortness of breath, peripheral edema, vision changes, or headache.  He is taking metoprolol and lisinopril early in the morning.  He has not checked his blood pressure at home.  He has no other questions or concerns at this time.    Physical examination:  General-NAD, overweight  Neck-no JVD in upright position  Chest-clear to auscultation bilaterally  Cardiac-regular rate and rhythm with 1/6 systolic murmur auscultated over aortic area  Extremities-no edema    Assessment and plan:  This is a pleasant 67-year-old gentleman who presents to cardiology clinic for blood pressure evaluation.  The initial check was slightly elevated at 136/78.  I rechecked this myself and it came down to 120/78.  At this time will continue with current medication regimen.  Patient does have a blood pressure machine at home.  I encouraged him to use his machine to check his blood pressure in the mid morning as well as in the mid afternoon.  If his afternoon blood pressures are high (greater than 130/85) consistently, we will increase his lisinopril to 5 mg 2 times daily.  I provided my nurse  team contact information.  Patient will call us in 2 weeks.    In the past has had low blood pressures.  He denies any recent NSAID use.  Denies any symptoms.  His weight has been fairly stable.    Thank you for allowing me to participate in the care of this delightful patient today.        This note was completed in part using Dragon voice recognition software. Although reviewed after completion, some word and grammatical errors may occur.    Orders this Visit:  No orders of the defined types were placed in this encounter.    No orders of the defined types were placed in this encounter.    There are no discontinued medications.      Encounter Diagnoses   Name Primary?     Coronary artery disease involving native coronary artery of native heart without angina pectoris      Mixed hyperlipidemia      Elevated blood pressure reading without diagnosis of hypertension Yes       CURRENT MEDICATIONS:  Current Outpatient Prescriptions   Medication Sig Dispense Refill     metFORMIN (GLUCOPHAGE) 500 MG tablet Take 1 tablet (500 mg) by mouth daily (with breakfast) 90 tablet 3     metoprolol (TOPROL XL) 100 MG 24 hr tablet Take 1 tablet (100 mg) by mouth daily 90 tablet 3     lisinopril (PRINIVIL/ZESTRIL) 5 MG tablet Take 1 tablet (5 mg) by mouth daily 90 tablet 3     sildenafil (REVATIO/VIAGRA) 20 MG tablet Take 2 tablets (40 mg) by mouth daily as needed Never use with nitroglycerin, terazosin or doxazosin. 60 tablet 11     clopidogrel (PLAVIX) 75 MG tablet Take 1 tablet (75 mg) by mouth daily 90 tablet 3     ezetimibe-simvastatin (VYTORIN) 10-20 MG per tablet Take 1 tablet by mouth At Bedtime 90 tablet 3     nitroglycerin (NITROSTAT) 0.4 MG sublingual tablet Place 1 tablet (0.4 mg) under the tongue every 5 minutes as needed for chest pain May repeat X 2. If no relief after 3 tablets call 911 25 tablet 2     aspirin 81 MG tablet Take 81 mg by mouth daily          ALLERGIES   No Known Allergies    PAST MEDICAL HISTORY:  Past  "Medical History:   Diagnosis Date     Alcohol Dependence in Remission     sober since 1996     CAD (coronary artery disease)     cardiac cath 2005: stent to PDA     Diverticulitis of colon 6/9/2008    Diagnosed after diarrheal episode from colchicine; \"Probable\" on CT scan 6/08.     GERD (gastroesophageal reflux disease)      Gout      History of acute inferior wall MI     2005     Left ventricular diastolic dysfunction      Mixed hyperlipidemia      Sleep apnea     C PAP       PAST SURGICAL HISTORY:  Past Surgical History:   Procedure Laterality Date     HC CORONARY STENT PERCUT, INITIAL VESSEL  4/18/2005    PTCA with intracoronary stent placement of, distal RCA and ostial PDA     HC REPAIR OF NASAL SEPTUM      Septoplasty and uvulectomy       FAMILY HISTORY:  Family History   Problem Relation Age of Onset     C.A.D. Mother      C.A.D. Father      DIABETES Brother      unsure of type 1/2     Cancer - colorectal No family hx of      Prostate Cancer No family hx of        SOCIAL HISTORY:  Social History     Social History     Marital status:      Spouse name: N/A     Number of children: N/A     Years of education: N/A     Social History Main Topics     Smoking status: Former Smoker     Quit date: 4/27/2000     Smokeless tobacco: Never Used      Comment: previous 2 ppd smoker     Alcohol use No     Drug use: No     Sexual activity: Yes     Partners: Female     Other Topics Concern     Parent/Sibling W/ Cabg, Mi Or Angioplasty Before 65f 55m? No     Caffeine Concern No     coffee 1/2 decaf- 4 cups a day     Sleep Concern No     Stress Concern No     Weight Concern No     Special Diet No     Exercise Yes     tredmill 1-2 days a week     Seat Belt Yes     Social History Narrative       Review of Systems:  Skin:  Negative     Eyes:  Negative    ENT:  Negative    Respiratory:  Negative    Cardiovascular:  Negative    Gastroenterology: Negative    Genitourinary:  not assessed    Musculoskeletal:  Positive for " "arthritis  Neurologic:  Negative    Psychiatric:  Negative    Heme/Lymph/Imm:  Negative    Endocrine:  Positive for diabetes    Physical Exam:  Vitals: /78  Pulse 50  Ht 1.632 m (5' 4.25\")  Wt 81.2 kg (179 lb)  BMI 30.49 kg/m2   Please refer to dictation for physical exam    Recent Lab Results:  LIPID RESULTS:  Lab Results   Component Value Date    CHOL 139 08/18/2017    HDL 57 08/18/2017    LDL 44 08/18/2017    TRIG 188 (H) 08/18/2017    CHOLHDLRATIO 2.6 10/08/2015       LIVER ENZYME RESULTS:  Lab Results   Component Value Date    AST 22 08/18/2017    ALT 34 08/18/2017       CBC RESULTS:  Lab Results   Component Value Date    WBC 8.3 06/01/2008    RBC 5.08 06/01/2008    HGB 14.6 06/01/2008    HCT 44.2 06/01/2008    MCV 87 06/01/2008    MCH 28.7 06/01/2008    MCHC 33.0 06/01/2008    RDW 13.9 06/01/2008     06/01/2008       BMP RESULTS:  Lab Results   Component Value Date     08/18/2017    POTASSIUM 4.1 08/18/2017    CHLORIDE 106 08/18/2017    CO2 27 08/18/2017    ANIONGAP 6 08/18/2017     (H) 08/18/2017    BUN 12 08/18/2017    CR 0.85 08/18/2017    GFRESTIMATED >90 08/18/2017    GFRESTBLACK >90 08/18/2017    RADHA 8.7 08/18/2017        A1C RESULTS:  Lab Results   Component Value Date    A1C 5.9 08/18/2017       INR RESULTS:  Lab Results   Component Value Date    INR 0.91 04/18/2005           Sebastian Peña PA-C   November 17, 2017     "

## 2017-11-17 NOTE — MR AVS SNAPSHOT
After Visit Summary   11/17/2017    Guillermo Ford    MRN: [id number]           Patient Information     Date Of Birth          1950        Visit Information        Provider Department      11/17/2017 8:00 AM Sebastian Peña PA-C [facility]        Today's Diagnoses     Elevated blood pressure reading without diagnosis of hypertension    -  1    Coronary artery disease involving native coronary artery of native heart without angina pectoris        Mixed hyperlipidemia          Care Instructions    Today's Plan:   1) Your blood pressure is better today.   2) Check your blood pressure daily at home and let us know if your blood pressure is above 130/80 consistently.   3) Follow up with Dr. Quezada in January.    If you have questions or concerns please call my nurse at [phone].     Scheduling phone number: [phone]  Reminder: Please bring in all current medications, over the counter supplements and vitamin bottles to your next appointment.    It was a pleasure seeing you today!     Sebastian Peña  11/17/2017              Follow-ups after your visit        Who to contact     If you have questions or need follow up information about today's clinic visit or your schedule please contact [facility] directly at [phone].  Normal or non-critical lab and imaging results will be communicated to you by MyChart, letter or phone within 4 business days after the clinic has received the results. If you do not hear from us within 7 days, please contact the clinic through MyChart or phone. If you have a critical or abnormal lab result, we will notify you by phone as soon as possible.  Submit refill requests through [employer] or call your pharmacy and they will forward the refill request to us. Please allow 3 business days for your refill to be completed.          Additional Information About Your Visit        Loop [employer]  "Information     Lily gives you secure access to your electronic health record. If you see a primary care provider, you can also send messages to your care team and make appointments. If you have questions, please call your primary care clinic.  If you do not have a primary care provider, please call [phone] and they will assist you.        Care EveryWhere ID     This is your Care EveryWhere ID. This could be used by other organizations to access your [facility] medical records  [id number]        Your Vitals Were     Pulse Height BMI (Body Mass Index)             50 1.632 m (5' 4.25\") 30.49 kg/m2          Blood Pressure from Last 3 Encounters:   11/17/17 120/78   10/03/17 153/80   08/18/17 92/60    Weight from Last 3 Encounters:   11/17/17 81.2 kg (179 lb)   10/03/17 83.3 kg (183 lb 9.6 oz)   08/18/17 82.6 kg (182 lb 1.6 oz)              We Performed the Following     Follow-Up with Cardiac Advanced Practice Provider        Primary Care Provider Office Phone # Fax #    Kvng Smith [phone] [address] [place] [address]        Equal Access to Services     [facility] HospitalADITHYA AH: Hadii yanci carriono Soyifanali, waaxda luqadaha, qaybta kaalmada adeegyada, kin miller. So [facility] [phone].    ATENCIÓN: Si habla español, tiene a fenton disposición servicios gratuitos de asistencia lingüística. Danyaame al [phone].    We comply with applicable federal civil rights laws and [place] laws. We do not discriminate on the basis of race, color, national origin, age, disability, sex, sexual orientation, or gender identity.            Thank you!     Thank you for choosing [facility]  for your care. Our goal is always to provide you with excellent care. Hearing back from our patients is one way we can continue to improve our services. Please take a few minutes to complete the written survey that you may receive in the mail " after your visit with us. Thank you!             Your Updated Medication List - Protect others around you: Learn how to safely use, store and throw away your medicines at www.disposemymeds.org.          This list is accurate as of: 11/17/17  8:23 AM.  Always use your most recent med list.                   Brand Name Dispense Instructions for use Diagnosis    aspirin 81 MG tablet      Take 81 mg by mouth daily        clopidogrel 75 MG tablet    PLAVIX    90 tablet    Take 1 tablet (75 mg) by mouth daily    Coronary artery disease involving native coronary artery without angina pectoris       ezetimibe-simvastatin 10-20 MG per tablet    VYTORIN    90 tablet    Take 1 tablet by mouth At Bedtime    Mixed hyperlipidemia       lisinopril 5 MG tablet    PRINIVIL/ZESTRIL    90 tablet    Take 1 tablet (5 mg) by mouth daily    Coronary artery disease involving native coronary artery of native heart without angina pectoris       metFORMIN 500 MG tablet    GLUCOPHAGE    90 tablet    Take 1 tablet (500 mg) by mouth daily (with breakfast)    Impaired fasting glucose       metoprolol 100 MG 24 hr tablet    TOPROL XL    90 tablet    Take 1 tablet (100 mg) by mouth daily    Coronary artery disease involving native coronary artery of native heart without angina pectoris       nitroGLYcerin 0.4 MG sublingual tablet    NITROSTAT    25 tablet    Place 1 tablet (0.4 mg) under the tongue every 5 minutes as needed for chest pain May repeat X 2. If no relief after 3 tablets call 911    Coronary artery disease involving native coronary artery of native heart without angina pectoris       sildenafil 20 MG tablet    REVATIO    60 tablet    Take 2 tablets (40 mg) by mouth daily as needed Never use with nitroglycerin, terazosin or doxazosin.    Erectile dysfunction, unspecified erectile dysfunction type

## 2018-01-11 ENCOUNTER — PRE VISIT (OUTPATIENT)
Dept: CARDIOLOGY | Facility: CLINIC | Age: 68
End: 2018-01-11

## 2018-01-11 PROBLEM — I10 BENIGN ESSENTIAL HYPERTENSION: Status: ACTIVE | Noted: 2018-01-11

## 2018-02-06 ENCOUNTER — OFFICE VISIT (OUTPATIENT)
Dept: CARDIOLOGY | Facility: CLINIC | Age: 68
End: 2018-02-06
Attending: INTERNAL MEDICINE
Payer: COMMERCIAL

## 2018-02-06 VITALS
BODY MASS INDEX: 29.82 KG/M2 | DIASTOLIC BLOOD PRESSURE: 74 MMHG | SYSTOLIC BLOOD PRESSURE: 134 MMHG | HEIGHT: 65 IN | WEIGHT: 179 LBS | HEART RATE: 74 BPM

## 2018-02-06 DIAGNOSIS — I25.10 CORONARY ARTERY DISEASE INVOLVING NATIVE CORONARY ARTERY OF NATIVE HEART WITHOUT ANGINA PECTORIS: ICD-10-CM

## 2018-02-06 DIAGNOSIS — E78.2 MIXED HYPERLIPIDEMIA: ICD-10-CM

## 2018-02-06 DIAGNOSIS — R03.0 ELEVATED BLOOD PRESSURE READING WITHOUT DIAGNOSIS OF HYPERTENSION: Primary | ICD-10-CM

## 2018-02-06 LAB
ALT SERPL W P-5'-P-CCNC: 5 U/L (ref 5–30)
ANION GAP SERPL CALCULATED.3IONS-SCNC: 13.1 MMOL/L (ref 6–17)
BUN SERPL-MCNC: 14 MG/DL (ref 7–30)
CALCIUM SERPL-MCNC: 9.6 MG/DL (ref 8.5–10.5)
CHLORIDE SERPL-SCNC: 102 MMOL/L (ref 98–107)
CHOLEST SERPL-MCNC: 144 MG/DL
CO2 SERPL-SCNC: 24 MMOL/L (ref 23–29)
CREAT SERPL-MCNC: 1.12 MG/DL (ref 0.7–1.3)
GFR SERPL CREATININE-BSD FRML MDRD: 66 ML/MIN/1.7M2
GLUCOSE SERPL-MCNC: 147 MG/DL (ref 70–105)
HDLC SERPL-MCNC: 52 MG/DL
LDLC SERPL CALC-MCNC: 57 MG/DL
NONHDLC SERPL-MCNC: 92 MG/DL
POTASSIUM SERPL-SCNC: 4.1 MMOL/L (ref 3.5–5.1)
SODIUM SERPL-SCNC: 135 MMOL/L (ref 136–145)
TRIGL SERPL-MCNC: 173 MG/DL

## 2018-02-06 PROCEDURE — 99214 OFFICE O/P EST MOD 30 MIN: CPT | Performed by: INTERNAL MEDICINE

## 2018-02-06 PROCEDURE — 84460 ALANINE AMINO (ALT) (SGPT): CPT | Performed by: INTERNAL MEDICINE

## 2018-02-06 PROCEDURE — 36415 COLL VENOUS BLD VENIPUNCTURE: CPT | Performed by: INTERNAL MEDICINE

## 2018-02-06 PROCEDURE — 80048 BASIC METABOLIC PNL TOTAL CA: CPT | Performed by: INTERNAL MEDICINE

## 2018-02-06 PROCEDURE — 80061 LIPID PANEL: CPT | Performed by: INTERNAL MEDICINE

## 2018-02-06 NOTE — LETTER
2/6/2018      Kvng Smith MD  [address] [place] [address]      RE: Guillermo Ford       Dear Colleague,    I had the pleasure of seeing Guillermo Ford in the [facility].    Service Date: 02/06/2018      HISTORY OF PRESENT ILLNESS:  The patient is a 67-year-old overweight white male initially hospitalized at [facility] in 2005 with evidence of an acute inferior wall myocardial infarction after being transferred from [facility].  Cardiac catheterization and coronary angiography revealed severe stenosis of the distal right coronary artery involving the PDA treated with PTCA and stent placement with an old angioplasty stent noted in the ostium of the posterior descending branch.  There had been previous hypokinesis of the inferior wall.  The ejection fraction at that time was 35% -40%.        The patient had a stress echocardiogram late last year that showed him going 10 minutes on Maksim protocol and not achieving target heart rate.  Images were foreshortened, but there was no significant wall motion abnormality.  There was a right bundle branch block pattern on electrocardiogram.        He has felt well since his last clinic visit with no significant symptoms of chest discomfort, shortness of breath, dizziness, palpitations, nausea, vomiting, diaphoresis or syncope.  He denies PND, orthopnea, fever, chills or sweats.      MEDICATIONS:   1.  Metformin 500 mg a day with breakfast.   2.  Metoprolol  mg a day.   3.  Lisinopril 5 mg a day.   4.  Viagra 40 mg as needed.   5.  Clopidogrel 75 mg a day.   6.  Vytorin 10/20 mg at bedtime.   7.  Nitroglycerin 0.4 mg sublingual p.r.n., not used.   8.  Aspirin 81 mg a day.      LABORATORY DATA:  Demonstrated a cholesterol 144, HDL 52, LDL 57, triglycerides 173.  Sodium 135, potassium 4.1, BUN 14, creatinine 1.12.  Most recent hemoglobin A1c was 5.9.  ALT was 5.      The patient presents to  Cardiology Clinic for followup of his ischemic heart disease.  He has been able to participate in activities without significant restriction.  He appears to be tolerating his medications well at the present time.      PHYSICAL EXAMINATION:   VITAL SIGNS:  Blood pressure 134/74 with a heart rate of 70-80 and regular.  Weight was 179 pounds, which is down 4 pounds from previous clinic visit.   NECK:  Without jugular venous distention, carotid bruit or palpable thyroid.   CHEST:  Essentially clear to percussion and auscultation with slight decreased breath sounds at the bases.   CARDIAC:  Regular rhythm, soft S4 gallop, 1-2/6 systolic murmur at the left sternal border with minimal radiation.  No diastolic murmur, rub or S3.   EXTREMITIES:  Without cyanosis or edema.      CLINICAL IMPRESSION:   1.  Stable cardiac condition.   2.  History of ischemic heart disease, status post inferior wall myocardial infarction, PTCA and stent of the proximal posterior descending branch of the right coronary artery in 2005.   3.  History of hyperlipidemia, at goal.   4.  Hypertension with borderline elevation in systolic blood pressure.   5.  Sleep apnea.   6.  Gastroesophageal reflux disease.   7.  Gout.   8.  Overweight.   9.  Type 2 diabetes mellitus, non-insulin dependent.      DISCUSSION:  The patient has done reasonably well from a cardiac viewpoint.  He unfortunately has not been able to lose a significant amount of weight.  He has not had prolonged symptoms of angina pectoris or congestive heart failure.  Systolic blood pressure has tended slightly higher as of late and he may require additional lisinopril in the form of 10 mg a day instead of his 5 mg a day.  He will need close followup of serum lipids, basic metabolic panel and blood pressure.  Stress echocardiography may be done later this year or early next year to follow the ischemic status of the myocardium and LV function.  Otherwise, he appears to be doing reasonably  well.  He has a moderate amount of caffeine and salt intake.      RECOMMENDATIONS:   1.  Continue present medications.  May need to consider increasing lisinopril to 10 mg a day.   2.  Diet and exercise program to lose weight, avoiding caffeine and salt.   3.  Aggressive diabetic management.   4.  Close followup of serum lipids, basic metabolic panel and blood pressure with followup with Sebastian Espana in 4-6 weeks.   5.  May consider repeat stress echocardiography later this year to follow left ventricular function and medical efficacy.   6.  Routine medical followup.   7.  Cardiology followup up in 4 months.      cc:      Kvng Smith MD    [address] [address]         ANGELINA RAMON MD, [facility]             D: 2018   T: 2018   MT: EDMOND      Name:     LOLA GRIFFIN   MRN:      [id number]        Account:      [id number]   :      1950           Service Date: 2018      Document: [id number]       Outpatient Encounter Prescriptions as of 2018   Medication Sig Dispense Refill     metFORMIN (GLUCOPHAGE) 500 MG tablet Take 1 tablet (500 mg) by mouth daily (with breakfast) 90 tablet 3     metoprolol (TOPROL XL) 100 MG 24 hr tablet Take 1 tablet (100 mg) by mouth daily 90 tablet 3     lisinopril (PRINIVIL/ZESTRIL) 5 MG tablet Take 1 tablet (5 mg) by mouth daily 90 tablet 3     sildenafil (REVATIO/VIAGRA) 20 MG tablet Take 2 tablets (40 mg) by mouth daily as needed Never use with nitroglycerin, terazosin or doxazosin. 60 tablet 11     clopidogrel (PLAVIX) 75 MG tablet Take 1 tablet (75 mg) by mouth daily 90 tablet 3     ezetimibe-simvastatin (VYTORIN) 10-20 MG per tablet Take 1 tablet by mouth At Bedtime 90 tablet 3     nitroglycerin (NITROSTAT) 0.4 MG sublingual tablet Place 1 tablet (0.4 mg) under the tongue every 5 minutes as needed for chest pain May repeat X 2. If no relief after 3 tablets call 911 25 tablet 2     aspirin 81 MG tablet Take  81 mg by mouth daily        No facility-administered encounter medications on file as of 2/6/2018.        Again, thank you for allowing me to participate in the care of your patient.      Sincerely,    Hemal Quezada MD     [facility]

## 2018-02-06 NOTE — LETTER
2/6/2018    Kvng Smith MD  [address] [place] [address]    RE: Guillermo Ford       Dear Colleague,    I had the pleasure of seeing Guillermo Ford in the [facility].    Service Date: 02/06/2018      HISTORY OF PRESENT ILLNESS:  The patient is a 67-year-old overweight white male initially hospitalized at [facility] in 2005 with evidence of an acute inferior wall myocardial infarction after being transferred from [facility] [place].  Cardiac catheterization and coronary angiography revealed severe stenosis of the distal right coronary artery involving the PDA treated with PTCA and stent placement with an old angioplasty stent noted in the ostium of the posterior descending branch.  There had been previous hypokinesis of the inferior wall.  The ejection fraction at that time was 35% -40%.        The patient had a stress echocardiogram late last year that showed him going 10 minutes on Maksim protocol and not achieving target heart rate.  Images were foreshortened, but there was no significant wall motion abnormality.  There was a right bundle branch block pattern on electrocardiogram.        He has felt well since his last clinic visit with no significant symptoms of chest discomfort, shortness of breath, dizziness, palpitations, nausea, vomiting, diaphoresis or syncope.  He denies PND, orthopnea, fever, chills or sweats.      MEDICATIONS:   1.  Metformin 500 mg a day with breakfast.   2.  Metoprolol  mg a day.   3.  Lisinopril 5 mg a day.   4.  Viagra 40 mg as needed.   5.  Clopidogrel 75 mg a day.   6.  Vytorin 10/20 mg at bedtime.   7.  Nitroglycerin 0.4 mg sublingual p.r.n., not used.   8.  Aspirin 81 mg a day.      LABORATORY DATA:  Demonstrated a cholesterol 144, HDL 52, LDL 57, triglycerides 173.  Sodium 135, potassium 4.1, BUN 14, creatinine 1.12.  Most recent hemoglobin A1c was 5.9.  ALT was 5.      The patient presents to Cardiology  Clinic for followup of his ischemic heart disease.  He has been able to participate in activities without significant restriction.  He appears to be tolerating his medications well at the present time.      PHYSICAL EXAMINATION:   VITAL SIGNS:  Blood pressure 134/74 with a heart rate of 70-80 and regular.  Weight was 179 pounds, which is down 4 pounds from previous clinic visit.   NECK:  Without jugular venous distention, carotid bruit or palpable thyroid.   CHEST:  Essentially clear to percussion and auscultation with slight decreased breath sounds at the bases.   CARDIAC:  Regular rhythm, soft S4 gallop, 1-2/6 systolic murmur at the left sternal border with minimal radiation.  No diastolic murmur, rub or S3.   EXTREMITIES:  Without cyanosis or edema.      CLINICAL IMPRESSION:   1.  Stable cardiac condition.   2.  History of ischemic heart disease, status post inferior wall myocardial infarction, PTCA and stent of the proximal posterior descending branch of the right coronary artery in 2005.   3.  History of hyperlipidemia, at goal.   4.  Hypertension with borderline elevation in systolic blood pressure.   5.  Sleep apnea.   6.  Gastroesophageal reflux disease.   7.  Gout.   8.  Overweight.   9.  Type 2 diabetes mellitus, non-insulin dependent.      DISCUSSION:  The patient has done reasonably well from a cardiac viewpoint.  He unfortunately has not been able to lose a significant amount of weight.  He has not had prolonged symptoms of angina pectoris or congestive heart failure.  Systolic blood pressure has tended slightly higher as of late and he may require additional lisinopril in the form of 10 mg a day instead of his 5 mg a day.  He will need close followup of serum lipids, basic metabolic panel and blood pressure.  Stress echocardiography may be done later this year or early next year to follow the ischemic status of the myocardium and LV function.  Otherwise, he appears to be doing reasonably well.  He has  a moderate amount of caffeine and salt intake.      RECOMMENDATIONS:   1.  Continue present medications.  May need to consider increasing lisinopril to 10 mg a day.   2.  Diet and exercise program to lose weight, avoiding caffeine and salt.   3.  Aggressive diabetic management.   4.  Close followup of serum lipids, basic metabolic panel and blood pressure with followup with Sebastian Espana in 4-6 weeks.   5.  May consider repeat stress echocardiography later this year to follow left ventricular function and medical efficacy.   6.  Routine medical followup.   7.  Cardiology followup up in 4 months.      cc:      Kvng Smith MD    [facility]   [address] [place] [address]         ANGELINA RAMON MD, [facility]             D: 2018   T: 2018   MT: EDMOND      Name:     LOLA GRIFFIN   MRN:      [id number]        Account:      [id number]   :      1950           Service Date: 2018      Document: [id number]       Thank you for allowing me to participate in the care of your patient.      Sincerely,     Angelina Ramon MD     [facility]    cc:   Angelina Ramon MD  [address] [place]  [place] [address]

## 2018-02-06 NOTE — MR AVS SNAPSHOT
After Visit Summary   2/6/2018    Guillermo Ford    MRN: [id number]           Patient Information     Date Of Birth          1950        Visit Information        Provider Department      2/6/2018 8:45 AM Hemal Quezada MD [facility]        Today's Diagnoses     Elevated blood pressure reading without diagnosis of hypertension    -  1    Coronary artery disease involving native coronary artery of native heart without angina pectoris        Mixed hyperlipidemia           Follow-ups after your visit        Additional Services     Follow-Up with Cardiologist                 Future tests that were ordered for you today     Open Future Orders        Priority Expected Expires Ordered    Basic metabolic panel Routine 6/6/2018 2/6/2019 2/6/2018    Lipid Profile Routine 6/6/2018 2/6/2019 2/6/2018    ALT Routine 6/6/2018 2/6/2019 2/6/2018    Echocardiogram Routine 6/6/2018 2/6/2019 2/6/2018    Follow-Up with Cardiologist Routine 6/6/2018 2/6/2019 2/6/2018            Who to contact     If you have questions or need follow up information about today's clinic visit or your schedule please contact [facility] directly at [phone].  Normal or non-critical lab and imaging results will be communicated to you by MyChart, letter or phone within 4 business days after the clinic has received the results. If you do not hear from us within 7 days, please contact the clinic through [employer] or phone. If you have a critical or abnormal lab result, we will notify you by phone as soon as possible.  Submit refill requests through [employer] or call your pharmacy and they will forward the refill request to us. Please allow 3 business days for your refill to be completed.          Additional Information About Your Visit        MyChart Information     [employer] gives you secure access to your electronic health record. If you see a primary care  "provider, you can also send messages to your care team and make appointments. If you have questions, please call your primary care clinic.  If you do not have a primary care provider, please call [phone] and they will assist you.        Care EveryWhere ID     This is your Care EveryWhere ID. This could be used by other organizations to access your [place] medical records  [id number]        Your Vitals Were     Pulse Height BMI (Body Mass Index)             74 1.638 m (5' 4.5\") 30.25 kg/m2          Blood Pressure from Last 3 Encounters:   02/06/18 134/74   11/17/17 120/78   10/03/17 153/80    Weight from Last 3 Encounters:   02/06/18 81.2 kg (179 lb)   11/17/17 81.2 kg (179 lb)   10/03/17 83.3 kg (183 lb 9.6 oz)              We Performed the Following     Follow-Up with Cardiologist        Primary Care Provider Office Phone # Fax #    Kvng Smith [phone] [address] [place] [address]        Equal Access to Services     [facility]: Hadii aad ku hadasho Soomaali, waaxda luqadaha, qaybta kaalmada adealonzo, kin pham . So [facility] [phone].    ATENCIÓN: Si habla español, tiene a fenton disposición servicios gratuitos de asistencia lingüística. Lester al [phone].    We comply with applicable federal civil rights laws and [place] laws. We do not discriminate on the basis of race, color, national origin, age, disability, sex, sexual orientation, or gender identity.            Thank you!     Thank you for choosing [facility]  for your care. Our goal is always to provide you with excellent care. Hearing back from our patients is one way we can continue to improve our services. Please take a few minutes to complete the written survey that you may receive in the mail after your visit with us. Thank you!             Your Updated Medication List - Protect others around you: Learn how to safely use, store " and throw away your medicines at www.disposemymeds.org.          This list is accurate as of 2/6/18  9:49 AM.  Always use your most recent med list.                   Brand Name Dispense Instructions for use Diagnosis    aspirin 81 MG tablet      Take 81 mg by mouth daily        clopidogrel 75 MG tablet    PLAVIX    90 tablet    Take 1 tablet (75 mg) by mouth daily    Coronary artery disease involving native coronary artery without angina pectoris       ezetimibe-simvastatin 10-20 MG per tablet    VYTORIN    90 tablet    Take 1 tablet by mouth At Bedtime    Mixed hyperlipidemia       lisinopril 5 MG tablet    PRINIVIL/ZESTRIL    90 tablet    Take 1 tablet (5 mg) by mouth daily    Coronary artery disease involving native coronary artery of native heart without angina pectoris       metFORMIN 500 MG tablet    GLUCOPHAGE    90 tablet    Take 1 tablet (500 mg) by mouth daily (with breakfast)    Impaired fasting glucose       metoprolol succinate 100 MG 24 hr tablet    TOPROL XL    90 tablet    Take 1 tablet (100 mg) by mouth daily    Coronary artery disease involving native coronary artery of native heart without angina pectoris       nitroGLYcerin 0.4 MG sublingual tablet    NITROSTAT    25 tablet    Place 1 tablet (0.4 mg) under the tongue every 5 minutes as needed for chest pain May repeat X 2. If no relief after 3 tablets call 911    Coronary artery disease involving native coronary artery of native heart without angina pectoris       sildenafil 20 MG tablet    REVATIO    60 tablet    Take 2 tablets (40 mg) by mouth daily as needed Never use with nitroglycerin, terazosin or doxazosin.    Erectile dysfunction, unspecified erectile dysfunction type

## 2018-02-07 NOTE — PROGRESS NOTES
Service Date: 02/06/2018      HISTORY OF PRESENT ILLNESS:  The patient is a 67-year-old overweight white male initially hospitalized at [facility] in 2005 with evidence of an acute inferior wall myocardial infarction after being transferred from [facility].  Cardiac catheterization and coronary angiography revealed severe stenosis of the distal right coronary artery involving the PDA treated with PTCA and stent placement with an old angioplasty stent noted in the ostium of the posterior descending branch.  There had been previous hypokinesis of the inferior wall.  The ejection fraction at that time was 35% -40%.        The patient had a stress echocardiogram late last year that showed him going 10 minutes on Maksim protocol and not achieving target heart rate.  Images were foreshortened, but there was no significant wall motion abnormality.  There was a right bundle branch block pattern on electrocardiogram.        He has felt well since his last clinic visit with no significant symptoms of chest discomfort, shortness of breath, dizziness, palpitations, nausea, vomiting, diaphoresis or syncope.  He denies PND, orthopnea, fever, chills or sweats.      MEDICATIONS:   1.  Metformin 500 mg a day with breakfast.   2.  Metoprolol  mg a day.   3.  Lisinopril 5 mg a day.   4.  Viagra 40 mg as needed.   5.  Clopidogrel 75 mg a day.   6.  Vytorin 10/20 mg at bedtime.   7.  Nitroglycerin 0.4 mg sublingual p.r.n., not used.   8.  Aspirin 81 mg a day.      LABORATORY DATA:  Demonstrated a cholesterol 144, HDL 52, LDL 57, triglycerides 173.  Sodium 135, potassium 4.1, BUN 14, creatinine 1.12.  Most recent hemoglobin A1c was 5.9.  ALT was 5.      The patient presents to Cardiology Clinic for followup of his ischemic heart disease.  He has been able to participate in activities without significant restriction.  He appears to be tolerating his medications well at the present time.      PHYSICAL  EXAMINATION:   VITAL SIGNS:  Blood pressure 134/74 with a heart rate of 70-80 and regular.  Weight was 179 pounds, which is down 4 pounds from previous clinic visit.   NECK:  Without jugular venous distention, carotid bruit or palpable thyroid.   CHEST:  Essentially clear to percussion and auscultation with slight decreased breath sounds at the bases.   CARDIAC:  Regular rhythm, soft S4 gallop, 1-2/6 systolic murmur at the left sternal border with minimal radiation.  No diastolic murmur, rub or S3.   EXTREMITIES:  Without cyanosis or edema.      CLINICAL IMPRESSION:   1.  Stable cardiac condition.   2.  History of ischemic heart disease, status post inferior wall myocardial infarction, PTCA and stent of the proximal posterior descending branch of the right coronary artery in 2005.   3.  History of hyperlipidemia, at goal.   4.  Hypertension with borderline elevation in systolic blood pressure.   5.  Sleep apnea.   6.  Gastroesophageal reflux disease.   7.  Gout.   8.  Overweight.   9.  Type 2 diabetes mellitus, non-insulin dependent.      DISCUSSION:  The patient has done reasonably well from a cardiac viewpoint.  He unfortunately has not been able to lose a significant amount of weight.  He has not had prolonged symptoms of angina pectoris or congestive heart failure.  Systolic blood pressure has tended slightly higher as of late and he may require additional lisinopril in the form of 10 mg a day instead of his 5 mg a day.  He will need close followup of serum lipids, basic metabolic panel and blood pressure.  Stress echocardiography may be done later this year or early next year to follow the ischemic status of the myocardium and LV function.  Otherwise, he appears to be doing reasonably well.  He has a moderate amount of caffeine and salt intake.      RECOMMENDATIONS:   1.  Continue present medications.  May need to consider increasing lisinopril to 10 mg a day.   2.  Diet and exercise program to lose weight,  avoiding caffeine and salt.   3.  Aggressive diabetic management.   4.  Close followup of serum lipids, basic metabolic panel and blood pressure with followup with Sebastian Esapna in 4-6 weeks.   5.  May consider repeat stress echocardiography later this year to follow left ventricular function and medical efficacy.   6.  Routine medical followup.   7.  Cardiology followup up in 4 months.      cc:      [place] [address]         ANGELINA RAMON MD, [facility]             D: 2018   T: 2018   MT: EDMOND      Name:     LOLA GRIFFIN   MRN:      [id number]        Account:      [id number]   :      1950           Service Date: 2018      Document: [id number]

## 2018-06-01 DIAGNOSIS — E78.2 MIXED HYPERLIPIDEMIA: ICD-10-CM

## 2018-06-01 RX ORDER — EZETIMIBE AND SIMVASTATIN 10; 20 MG/1; MG/1
1 TABLET ORAL AT BEDTIME
Qty: 90 TABLET | Refills: 3 | Status: SHIPPED | OUTPATIENT
Start: 2018-06-01 | End: 2019-01-24

## 2018-06-19 ENCOUNTER — OFFICE VISIT (OUTPATIENT)
Dept: URGENT CARE | Facility: URGENT CARE | Age: 68
End: 2018-06-19
Payer: COMMERCIAL

## 2018-06-19 VITALS
HEART RATE: 55 BPM | OXYGEN SATURATION: 96 % | BODY MASS INDEX: 31.1 KG/M2 | DIASTOLIC BLOOD PRESSURE: 74 MMHG | WEIGHT: 184 LBS | SYSTOLIC BLOOD PRESSURE: 124 MMHG | TEMPERATURE: 97.8 F
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
## 2018-06-19 PROCEDURE — 99213 OFFICE O/P EST LOW 20 MIN: CPT | Performed by: FAMILY MEDICINE

## 2018-06-19 NOTE — MR AVS SNAPSHOT
After Visit Summary   6/19/2018    Guillermo Ford    MRN: [id number]           Patient Information     Date Of Birth          1950        Visit Information        Provider Department      6/19/2018 4:35 PM Nataliia Vance MD [facility]        Today's Diagnoses     Insect bite, initial encounter    -  1      Care Instructions      Insect Bite  Insects most often bite to protect themselves or their nests. Certain bugs, like fleas and mosquitoes, bite to feed. In some cases, the actual bite causes no pain. An itchy red welt or swelling may develop at the site of the bite. Most insect bites do not cause illness. And the itching and swelling most often go away without treatment. However, an infection can develop if the bite is scratched and the skin broken. Rarely, a person may have an allergic reaction to an insect bite.  If a stinger is visible at the bite spot, remove it as quickly as possible, as this can decrease the amount of venom that gets into your body. Scrape it out with a dull edge, such as the edge of a credit card. Try not to squeeze it. Do not try to dig it out, as you may damage the skin and also increase the chance of infection.     To help reduce swelling and itching, apply a cold pack or ice in a zip-top plastic bag wrapped in a thin towel.   Home care    Your healthcare provider may prescribe over-the-counter medicines to help relieve itching and swelling. Use each medicine according to the directions on the package. If the bite becomes infected, you will need an antibiotic. This may be in pill form taken by mouth or as an ointment or cream put directly on the skin. Be sure to use them exactly as prescribed.    Bite symptoms usually go away on their own within a week or two.    To help prevent infection, avoid scratching or picking at the bite.    To help relieve itching and swelling, apply ice in a zip-top plastic bag wrapped in a thin towel to the  bites. Do this for up to 10 minutes at a time. Avoid hot showers or baths as these tend to make itching worse.    An over-the-counter anti-itch medicine such as calamine lotion or an antihistamine cream may be helpful.    If you suspect you have insects in your home, talk to a licensed pest-control professional. He or she can inspect your home and tell you how to get rid of bugs safely.  Follow-up care  Follow up with your healthcare provider, or as advised.  Call 911  Call 911 if any of these occur:    Trouble breathing or swallowing    Wheezing    Feeling like your throat is closing up    Fainting, loss of consciousness    Swelling around the face or mouth  When to seek medical advice  Call your healthcare provider right away if any of these occur:    Fever of 100.4 F (38 C) or higher, or as directed by your healthcare provider    Signs of infection, such as increased swelling and pain, warmth, red streaks, or drainage from the skin    Signs of allergic reaction, such as hives, a spreading rash, or throat itching  Date Last Reviewed: 10/1/2016    4765-5430 [employer]. [address] [place] [address]. All rights reserved. This information is not intended as a substitute for professional medical care. Always follow your healthcare professional's instructions.        Bedbugs    After years of being very rare in the , bedbugs are making a comeback. These bugs are small, about the size of an apple seed. They are reddish-brown, oval, and look slightly flattened. Bedbugs feed on human and animal blood, usually at night during sleep. Bedbugs are a nuisance. But they are not a major threat to your health.  Facts about bedbugs    Bedbugs are active mainly at night. During the day, they hide in dark places, often in and around where people or animals sleep. They are commonly found on mattresses and boxsprings and behind headboards. But they can hide anywhere.    Bedbugs are small and hard to see.  They are often carried from place to place in items like luggage, furniture, and clothing. This is why they spread so easily.    Bedbugs are not attracted to dirt. Even the cleanest house or hotel can have bedbugs.    Unlike mosquitoes, bedbugs do not transmit disease. If you are bitten, you do not have to worry about catching a blood-borne illness.    Insect repellents have little effect on bedbugs.    Adult bedbugs can live for several months without a blood feeding.    Bedbugs are very hard to get rid of. If an infestation is suspected, it is recommended that a professional  be called.  Signs of bedbugs  Bites can be the first sign of a bedbug infestation. When inspecting for the bugs, look in crevices of mattresses and box springs, behind the headboard, and in and on objects near or under the bed. You may see the bugs themselves. Or, you may see tiny dark stains on fabric or carpets. Smears of blood on sheets and nightclothes upon awakening are another sign. In some cases, the bugs are so well hidden they can t be found unless items are taken apart.  Bedbug bites  Bedbugs look for food at night. They bite while people or animals are sleeping. The bites are most often painless. Many people never know they ve been bitten. But some people develop an itchy red welt or swelling. And if a person has an allergic reaction, severe itching, blisters, or hives can develop. Bites are often on areas that are exposed, such as the head, neck, arms, and hands. Bedbug bites are not dangerous and don t spread illness. But if the bite is scratched and the skin is broken and irritated, there is a chance that a skin infection can develop.  Treating bites  Bite symptoms usually go away on their own within a week or two. During this time, over-the-counter (OTC) hydrocortisone ointment or cream can help relieve itching and swelling. If itching is bad, an OTC antihistamine that s taken by mouth (oral) can help. If an  infection develops from scratching the bites, your healthcare provider can prescribe an antibiotic.  If you were bitten by bedbugs in your home, talk to a licensed pest-control professional or company. They can inspect your home and help you get rid of the bugs safely.  When to call your healthcare provider   If you have bites, call your healthcare provider if you develop any of the following:    A fever of 100.4 F (38 C) or higher, or as directed by your provider    Signs of infection of the bites, such as increased swelling and pain, warmth, or oozing    Signs of allergic reaction, such as hives, spreading rash, throat itching or swelling, or wheezing   Avoiding bedbugs    Avoid buying used beds. But if you do buy used bed frames, mattresses, box springs, or other furniture, check them carefully for bedbugs before bringing them into your home.    If bedbugs are found or suspected in the bed, use mattress and box spring encasement covers that can seal in bedbugs so they will eventually die there.    When traveling, remove linens from the top of the bed and check the mattress and headboard for signs of the bugs. Place luggage on a hard surface such as a table or on a luggage rack and not on the floor.    If you think you were exposed to bedbugs while traveling, wash all clothing in hot water as soon as you get home. Washing alone will not kill the bugs. Clothing must be put in a dryer at high temperatures, at least 113  F (45  C) for 1 hour.     Never  items discarded on the street for use in your home. These include bed frames, mattresses, box springs, or upholstered furniture. These items may carry bedbugs.     Date Last Reviewed: 3/1/2017    3940-5106 [employer]. [address] [place] [address], [place] [address]. All rights reserved. This information is not intended as a substitute for professional medical care. Always follow your healthcare professional's instructions.                Follow-ups  after your visit        Follow-up notes from your care team     Return if symptoms worsen or fail to improve.      Your next 10 appointments already scheduled     Jul 10, 2018  7:40 AM CDT   LAB with EA LAB   [facility] [place] ([facility])    [address]  [address]  [place] [address] [phone]           Please do not eat 10-12 hours before your appointment if you are coming in fasting for labs on lipids, cholesterol, or glucose (sugar). This does not apply to pregnant women. Water, hot tea and black coffee (with nothing added) are okay. Do not drink other fluids, diet soda or chew gum.            Jul 10, 2018  8:00 AM CDT   Ech Complete with EAECH1   [facility] [place] ([facility])    [address]  [address]  [place] [address] [phone]           1. Please bring or wear a comfortable two-piece outfit. 2. You may eat, drink and take your normal medicines. 3. For any questions that cannot be answered, please contact the ordering physician            Jul 13, 2018  7:45 AM CDT   Return Visit with Hemal Quezada MD   [facility] ([facility])    [facility]  [facility] [address] [phone] OPT 2              Who to contact     If you have questions or need follow up information about today's clinic visit or your schedule please contact [facility] directly at [phone].  Normal or non-critical lab and imaging results will be communicated to you by MyChart, letter or phone within 4 business days after the clinic has received the results. If you do not hear from us within 7 days, please contact the clinic through MyChart or phone. If you have a critical or abnormal lab result, we will notify you by phone as soon as possible.  Submit refill requests through [employer] or call your pharmacy and they will forward the refill request to us. Please allow  3 business days for your refill to be completed.          Additional Information About Your Visit        MyChart Information     [employer] gives you secure access to your electronic health record. If you see a primary care provider, you can also send messages to your care team and make appointments. If you have questions, please call your primary care clinic.  If you do not have a primary care provider, please call [phone] and they will assist you.        Care EveryWhere ID     This is your Care EveryWhere ID. This could be used by other organizations to access your [place] medical records  [id number]        Your Vitals Were     Pulse Temperature Pulse Oximetry BMI (Body Mass Index)          55 97.8  F (36.6  C) (Oral) 96% 31.1 kg/m2         Blood Pressure from Last 3 Encounters:   06/19/18 124/74   02/06/18 134/74   11/17/17 120/78    Weight from Last 3 Encounters:   06/19/18 184 lb (83.5 kg)   02/06/18 179 lb (81.2 kg)   11/17/17 179 lb (81.2 kg)              Today, you had the following     No orders found for display       Primary Care Provider Office Phone # Fax #    Kvng Smith [phone] [address] [place] [address]        Equal Access to Services     RIGOBERTO JONES : Hadii yanci ku hadasho Soomaali, waaxda luqadaha, qaybta kaalmada adeegyada, kin miller. So [facility] [phone].    ATENCIÓN: Si habla español, tiene a fenton disposición servicios gratuitos de asistencia lingüística. Llame al [phone].    We comply with applicable federal civil rights laws and [place] laws. We do not discriminate on the basis of race, color, national origin, age, disability, sex, sexual orientation, or gender identity.            Thank you!     Thank you for choosing [facility] [facility]  for your care. Our goal is always to provide you with excellent care. Hearing back from our patients is one way we can continue to improve our services. Please  take a few minutes to complete the written survey that you may receive in the mail after your visit with us. Thank you!             Your Updated Medication List - Protect others around you: Learn how to safely use, store and throw away your medicines at www.disposemymeds.org.          This list is accurate as of 6/19/18  5:20 PM.  Always use your most recent med list.                   Brand Name Dispense Instructions for use Diagnosis    aspirin 81 MG tablet      Take 81 mg by mouth daily        clopidogrel 75 MG tablet    PLAVIX    90 tablet    Take 1 tablet (75 mg) by mouth daily    Coronary artery disease involving native coronary artery without angina pectoris       ezetimibe-simvastatin 10-20 MG per tablet    VYTORIN    90 tablet    Take 1 tablet by mouth At Bedtime    Mixed hyperlipidemia       lisinopril 5 MG tablet    PRINIVIL/ZESTRIL    90 tablet    Take 1 tablet (5 mg) by mouth daily    Coronary artery disease involving native coronary artery of native heart without angina pectoris       metFORMIN 500 MG tablet    GLUCOPHAGE    90 tablet    Take 1 tablet (500 mg) by mouth daily (with breakfast)    Impaired fasting glucose       metoprolol succinate 100 MG 24 hr tablet    TOPROL XL    90 tablet    Take 1 tablet (100 mg) by mouth daily    Coronary artery disease involving native coronary artery of native heart without angina pectoris       nitroGLYcerin 0.4 MG sublingual tablet    NITROSTAT    25 tablet    Place 1 tablet (0.4 mg) under the tongue every 5 minutes as needed for chest pain May repeat X 2. If no relief after 3 tablets call 911    Coronary artery disease involving native coronary artery of native heart without angina pectoris       sildenafil 20 MG tablet    REVATIO    60 tablet    Take 2 tablets (40 mg) by mouth daily as needed Never use with nitroglycerin, terazosin or doxazosin.    Erectile dysfunction, unspecified erectile dysfunction type

## 2018-06-19 NOTE — PATIENT INSTRUCTIONS
Insect Bite  Insects most often bite to protect themselves or their nests. Certain bugs, like fleas and mosquitoes, bite to feed. In some cases, the actual bite causes no pain. An itchy red welt or swelling may develop at the site of the bite. Most insect bites do not cause illness. And the itching and swelling most often go away without treatment. However, an infection can develop if the bite is scratched and the skin broken. Rarely, a person may have an allergic reaction to an insect bite.  If a stinger is visible at the bite spot, remove it as quickly as possible, as this can decrease the amount of venom that gets into your body. Scrape it out with a dull edge, such as the edge of a credit card. Try not to squeeze it. Do not try to dig it out, as you may damage the skin and also increase the chance of infection.     To help reduce swelling and itching, apply a cold pack or ice in a zip-top plastic bag wrapped in a thin towel.   Home care    Your healthcare provider may prescribe over-the-counter medicines to help relieve itching and swelling. Use each medicine according to the directions on the package. If the bite becomes infected, you will need an antibiotic. This may be in pill form taken by mouth or as an ointment or cream put directly on the skin. Be sure to use them exactly as prescribed.    Bite symptoms usually go away on their own within a week or two.    To help prevent infection, avoid scratching or picking at the bite.    To help relieve itching and swelling, apply ice in a zip-top plastic bag wrapped in a thin towel to the bites. Do this for up to 10 minutes at a time. Avoid hot showers or baths as these tend to make itching worse.    An over-the-counter anti-itch medicine such as calamine lotion or an antihistamine cream may be helpful.    If you suspect you have insects in your home, talk to a licensed pest-control professional. He or she can inspect your home and tell you how to get rid of bugs  safely.  Follow-up care  Follow up with your healthcare provider, or as advised.  Call 911  Call 911 if any of these occur:    Trouble breathing or swallowing    Wheezing    Feeling like your throat is closing up    Fainting, loss of consciousness    Swelling around the face or mouth  When to seek medical advice  Call your healthcare provider right away if any of these occur:    Fever of 100.4 F (38 C) or higher, or as directed by your healthcare provider    Signs of infection, such as increased swelling and pain, warmth, red streaks, or drainage from the skin    Signs of allergic reaction, such as hives, a spreading rash, or throat itching  Date Last Reviewed: 10/1/2016    3317-5368 [employer]. [address] [place] [address], [place] [address]. All rights reserved. This information is not intended as a substitute for professional medical care. Always follow your healthcare professional's instructions.        Bedbugs    After years of being very rare in the , bedbugs are making a comeback. These bugs are small, about the size of an apple seed. They are reddish-brown, oval, and look slightly flattened. Bedbugs feed on human and animal blood, usually at night during sleep. Bedbugs are a nuisance. But they are not a major threat to your health.  Facts about bedbugs    Bedbugs are active mainly at night. During the day, they hide in dark places, often in and around where people or animals sleep. They are commonly found on mattresses and boxsprings and behind headboards. But they can hide anywhere.    Bedbugs are small and hard to see. They are often carried from place to place in items like luggage, furniture, and clothing. This is why they spread so easily.    Bedbugs are not attracted to dirt. Even the cleanest house or hotel can have bedbugs.    Unlike mosquitoes, bedbugs do not transmit disease. If you are bitten, you do not have to worry about catching a blood-borne illness.    Insect repellents have  little effect on bedbugs.    Adult bedbugs can live for several months without a blood feeding.    Bedbugs are very hard to get rid of. If an infestation is suspected, it is recommended that a professional  be called.  Signs of bedbugs  Bites can be the first sign of a bedbug infestation. When inspecting for the bugs, look in crevices of mattresses and box springs, behind the headboard, and in and on objects near or under the bed. You may see the bugs themselves. Or, you may see tiny dark stains on fabric or carpets. Smears of blood on sheets and nightclothes upon awakening are another sign. In some cases, the bugs are so well hidden they can t be found unless items are taken apart.  Bedbug bites  Bedbugs look for food at night. They bite while people or animals are sleeping. The bites are most often painless. Many people never know they ve been bitten. But some people develop an itchy red welt or swelling. And if a person has an allergic reaction, severe itching, blisters, or hives can develop. Bites are often on areas that are exposed, such as the head, neck, arms, and hands. Bedbug bites are not dangerous and don t spread illness. But if the bite is scratched and the skin is broken and irritated, there is a chance that a skin infection can develop.  Treating bites  Bite symptoms usually go away on their own within a week or two. During this time, over-the-counter (OTC) hydrocortisone ointment or cream can help relieve itching and swelling. If itching is bad, an OTC antihistamine that s taken by mouth (oral) can help. If an infection develops from scratching the bites, your healthcare provider can prescribe an antibiotic.  If you were bitten by bedbugs in your home, talk to a licensed pest-control professional or company. They can inspect your home and help you get rid of the bugs safely.  When to call your healthcare provider   If you have bites, call your healthcare provider if you develop any of the  following:    A fever of 100.4 F (38 C) or higher, or as directed by your provider    Signs of infection of the bites, such as increased swelling and pain, warmth, or oozing    Signs of allergic reaction, such as hives, spreading rash, throat itching or swelling, or wheezing   Avoiding bedbugs    Avoid buying used beds. But if you do buy used bed frames, mattresses, box springs, or other furniture, check them carefully for bedbugs before bringing them into your home.    If bedbugs are found or suspected in the bed, use mattress and box spring encasement covers that can seal in bedbugs so they will eventually die there.    When traveling, remove linens from the top of the bed and check the mattress and headboard for signs of the bugs. Place luggage on a hard surface such as a table or on a luggage rack and not on the floor.    If you think you were exposed to bedbugs while traveling, wash all clothing in hot water as soon as you get home. Washing alone will not kill the bugs. Clothing must be put in a dryer at high temperatures, at least 113  F (45  C) for 1 hour.     Never  items discarded on the street for use in your home. These include bed frames, mattresses, box springs, or upholstered furniture. These items may carry bedbugs.     Date Last Reviewed: 3/1/2017    5794-6091 [employer]. [address] [place] [address]. All rights reserved. This information is not intended as a substitute for professional medical care. Always follow your healthcare professional's instructions.

## 2018-06-19 NOTE — PROGRESS NOTES
"SUBJECTIVE:  Guillermo Ford is a 67 year old male who presents to the clinic today for a rash.  Onset of rash was 4 day(s) ago.   Rash is sudden onset.  Location of the rash: started on right arm, now on right nape of neck and left forearm.  Quality/symptoms of rash: itching and red   Symptoms are mild and moderate and rash seems to be not changing over the course of time.  Previous history of a similar rash? No  Recent exposure history: none known.  Does have dogs at home but they do not have fleas.  Wife does not have the rash.  Does sit out on the porch to watch his dogs run around in the evening.  Did not notice any insect bites then though.    Associated symptoms include: nothing.    Past Medical History:   Diagnosis Date     Alcohol Dependence in Remission     sober since 1996     CAD (coronary artery disease)     cardiac cath 2005: stent to PDA     Diverticulitis of colon 6/9/2008    Diagnosed after diarrheal episode from colchicine; \"Probable\" on CT scan 6/08.     GERD (gastroesophageal reflux disease)      Gout      History of acute inferior wall MI     2005     Left ventricular diastolic dysfunction      Mixed hyperlipidemia      Sleep apnea     C PAP     Current Outpatient Prescriptions   Medication Sig Dispense Refill     aspirin 81 MG tablet Take 81 mg by mouth daily        clopidogrel (PLAVIX) 75 MG tablet Take 1 tablet (75 mg) by mouth daily 90 tablet 3     ezetimibe-simvastatin (VYTORIN) 10-20 MG per tablet Take 1 tablet by mouth At Bedtime 90 tablet 3     lisinopril (PRINIVIL/ZESTRIL) 5 MG tablet Take 1 tablet (5 mg) by mouth daily 90 tablet 3     metFORMIN (GLUCOPHAGE) 500 MG tablet Take 1 tablet (500 mg) by mouth daily (with breakfast) 90 tablet 3     metoprolol (TOPROL XL) 100 MG 24 hr tablet Take 1 tablet (100 mg) by mouth daily 90 tablet 3     sildenafil (REVATIO/VIAGRA) 20 MG tablet Take 2 tablets (40 mg) by mouth daily as needed Never use with nitroglycerin, terazosin or doxazosin. 60 tablet " 11     nitroglycerin (NITROSTAT) 0.4 MG sublingual tablet Place 1 tablet (0.4 mg) under the tongue every 5 minutes as needed for chest pain May repeat X 2. If no relief after 3 tablets call 911 (Patient not taking: Reported on 6/19/2018) 25 tablet 2     Social History   Substance Use Topics     Smoking status: Former Smoker     Quit date: 4/27/2000     Smokeless tobacco: Never Used      Comment: previous 2 ppd smoker     Alcohol use No       ROS:  Review of systems negative except as stated above.    EXAM:   /74 (Cuff Size: Adult Regular)  Pulse 55  Temp 97.8  F (36.6  C) (Oral)  Wt 184 lb (83.5 kg)  SpO2 96%  BMI 31.1 kg/m2  GENERAL: alert, no acute distress.  SKIN: Rash description:    Distribution: localized  Location: right forearm, left arm, right nape of neck    Color: red,  Lesion type: maculopapular, isolated, scattered discrete lesions with no other findings    ASSESSMENT:  Insect bites    PLAN:  1) Recommend check bedding for signs of bedbugs.  It is possible that he was bitten by gnats.  Recommend OTC hydrocortisone 1% BID-TID and may take OTC Benadryl prn.  May need to spray clothing with Deet containing products or use Citronella prn.  2) Follow-up with primary clinic if not improving.  Nataliia Valentin MD

## 2018-06-28 ENCOUNTER — PRE VISIT (OUTPATIENT)
Dept: CARDIOLOGY | Facility: CLINIC | Age: 68
End: 2018-06-28

## 2018-07-05 DIAGNOSIS — I25.10 CORONARY ARTERY DISEASE INVOLVING NATIVE CORONARY ARTERY WITHOUT ANGINA PECTORIS: ICD-10-CM

## 2018-07-05 RX ORDER — CLOPIDOGREL BISULFATE 75 MG/1
75 TABLET ORAL DAILY
Qty: 30 TABLET | Refills: 0 | Status: SHIPPED | OUTPATIENT
Start: 2018-07-05 | End: 2018-08-07

## 2018-07-10 DIAGNOSIS — E78.2 MIXED HYPERLIPIDEMIA: ICD-10-CM

## 2018-07-10 DIAGNOSIS — R03.0 ELEVATED BLOOD PRESSURE READING WITHOUT DIAGNOSIS OF HYPERTENSION: ICD-10-CM

## 2018-07-10 DIAGNOSIS — I25.10 CORONARY ARTERY DISEASE INVOLVING NATIVE CORONARY ARTERY OF NATIVE HEART WITHOUT ANGINA PECTORIS: ICD-10-CM

## 2018-07-10 LAB
ALT SERPL W P-5'-P-CCNC: <5 U/L (ref 5–30)
ANION GAP SERPL CALCULATED.3IONS-SCNC: 15 MMOL/L (ref 6–17)
BUN SERPL-MCNC: 15 MG/DL (ref 7–30)
CALCIUM SERPL-MCNC: 9.5 MG/DL (ref 8.5–10.5)
CHLORIDE SERPL-SCNC: 102 MMOL/L (ref 98–107)
CHOLEST SERPL-MCNC: 141 MG/DL
CO2 SERPL-SCNC: 22 MMOL/L (ref 23–29)
CREAT SERPL-MCNC: 1 MG/DL (ref 0.7–1.3)
GFR SERPL CREATININE-BSD FRML MDRD: 75 ML/MIN/1.7M2
GLUCOSE SERPL-MCNC: 138 MG/DL (ref 70–105)
HDLC SERPL-MCNC: 47 MG/DL
LDLC SERPL CALC-MCNC: 53 MG/DL
NONHDLC SERPL-MCNC: 94 MG/DL
POTASSIUM SERPL-SCNC: 4 MMOL/L (ref 3.5–5.1)
SODIUM SERPL-SCNC: 135 MMOL/L (ref 136–145)
TRIGL SERPL-MCNC: 205 MG/DL

## 2018-07-10 PROCEDURE — 80061 LIPID PANEL: CPT | Performed by: INTERNAL MEDICINE

## 2018-07-10 PROCEDURE — 80048 BASIC METABOLIC PNL TOTAL CA: CPT | Performed by: INTERNAL MEDICINE

## 2018-07-10 PROCEDURE — 36415 COLL VENOUS BLD VENIPUNCTURE: CPT | Performed by: INTERNAL MEDICINE

## 2018-07-10 PROCEDURE — 84460 ALANINE AMINO (ALT) (SGPT): CPT | Performed by: INTERNAL MEDICINE

## 2018-07-11 ENCOUNTER — HOSPITAL ENCOUNTER (OUTPATIENT)
Dept: CARDIOLOGY | Facility: CLINIC | Age: 68
Discharge: HOME | End: 2018-07-11
Attending: INTERNAL MEDICINE | Admitting: INTERNAL MEDICINE
Payer: COMMERCIAL

## 2018-07-11 DIAGNOSIS — I25.10 CORONARY ARTERY DISEASE INVOLVING NATIVE CORONARY ARTERY OF NATIVE HEART WITHOUT ANGINA PECTORIS: ICD-10-CM

## 2018-07-11 DIAGNOSIS — E78.2 MIXED HYPERLIPIDEMIA: ICD-10-CM

## 2018-07-11 DIAGNOSIS — R03.0 ELEVATED BLOOD PRESSURE READING WITHOUT DIAGNOSIS OF HYPERTENSION: ICD-10-CM

## 2018-07-11 PROCEDURE — 93306 TTE W/DOPPLER COMPLETE: CPT

## 2018-07-11 PROCEDURE — 93306 TTE W/DOPPLER COMPLETE: CPT | Mod: 26 | Performed by: INTERNAL MEDICINE

## 2018-07-13 ENCOUNTER — OFFICE VISIT (OUTPATIENT)
Dept: CARDIOLOGY | Facility: CLINIC | Age: 68
End: 2018-07-13
Attending: INTERNAL MEDICINE
Payer: COMMERCIAL

## 2018-07-13 VITALS
BODY MASS INDEX: 30.32 KG/M2 | HEART RATE: 62 BPM | WEIGHT: 182 LBS | DIASTOLIC BLOOD PRESSURE: 76 MMHG | SYSTOLIC BLOOD PRESSURE: 122 MMHG | HEIGHT: 65 IN

## 2018-07-13 DIAGNOSIS — I25.10 CORONARY ARTERY DISEASE INVOLVING NATIVE CORONARY ARTERY OF NATIVE HEART WITHOUT ANGINA PECTORIS: ICD-10-CM

## 2018-07-13 DIAGNOSIS — R03.0 ELEVATED BLOOD PRESSURE READING WITHOUT DIAGNOSIS OF HYPERTENSION: ICD-10-CM

## 2018-07-13 DIAGNOSIS — E78.2 MIXED HYPERLIPIDEMIA: ICD-10-CM

## 2018-07-13 PROCEDURE — 99214 OFFICE O/P EST MOD 30 MIN: CPT | Performed by: INTERNAL MEDICINE

## 2018-07-13 NOTE — MR AVS SNAPSHOT
After Visit Summary   7/13/2018    Guillermo Ford    MRN: [id number]           Patient Information     Date Of Birth          1950        Visit Information        Provider Department      7/13/2018 7:45 AM Hemal Quezada MD [facility]        Today's Diagnoses     Coronary artery disease involving native coronary artery of native heart without angina pectoris        Mixed hyperlipidemia        Elevated blood pressure reading without diagnosis of hypertension           Follow-ups after your visit        Additional Services     Follow-Up with Cardiac Advanced Practice Provider           Follow-Up with Cardiologist                 Future tests that were ordered for you today     Open Future Orders        Priority Expected Expires Ordered    Basic metabolic panel Routine 1/9/2019 7/13/2019 7/13/2018    Lipid Profile Routine 1/9/2019 7/13/2019 7/13/2018    ALT Routine 1/9/2019 7/13/2019 7/13/2018    Follow-Up with Cardiologist Routine 1/9/2019 7/13/2019 7/13/2018    Basic metabolic panel Routine 10/11/2018 7/13/2019 7/13/2018    Follow-Up with Cardiac Advanced Practice Provider Routine 10/11/2018 7/13/2019 7/13/2018            Who to contact     If you have questions or need follow up information about today's clinic visit or your schedule please contact [facility] directly at [phone].  Normal or non-critical lab and imaging results will be communicated to you by MyChart, letter or phone within 4 business days after the clinic has received the results. If you do not hear from us within 7 days, please contact the clinic through MyChart or phone. If you have a critical or abnormal lab result, we will notify you by phone as soon as possible.  Submit refill requests through [employer] or call your pharmacy and they will forward the refill request to us. Please allow 3 business days for your refill to be completed.        "   Additional Information About Your Visit        MyChart Information     [facility] gives you secure access to your electronic health record. If you see a primary care provider, you can also send messages to your care team and make appointments. If you have questions, please call your primary care clinic.  If you do not have a primary care provider, please call [phone] and they will assist you.        Care EveryWhere ID     This is your Care EveryWhere ID. This could be used by other organizations to access your [facility] medical records  [id number]        Your Vitals Were     Pulse Height BMI (Body Mass Index)             62 1.638 m (5' 4.5\") 30.76 kg/m2          Blood Pressure from Last 3 Encounters:   07/13/18 122/76   06/19/18 124/74   02/06/18 134/74    Weight from Last 3 Encounters:   07/13/18 82.6 kg (182 lb)   06/19/18 83.5 kg (184 lb)   02/06/18 81.2 kg (179 lb)              We Performed the Following     Follow-Up with Cardiologist        Primary Care Provider Office Phone # Fax #    Kvng Smith [phone] [address] [place] [address]        Equal Access to Services     [facility] Hospital[facility]ITH[facility] AH: Hadii aad mau carriono Soluz, waaxda luqadaha, qaybta kaalmada adeegyada, kin miller. So [facility] [phone].    ATENCIÓN: Si habla español, tiene a fenton disposición servicios gratuitos de asistencia lingüística. Llame al [phone].    We comply with applicable federal civil rights laws and [place] laws. We do not discriminate on the basis of race, color, national origin, age, disability, sex, sexual orientation, or gender identity.            Thank you!     Thank you for choosing [facility]  for your care. Our goal is always to provide you with excellent care. Hearing back from our patients is one way we can continue to improve our services. Please take a few minutes to complete the written survey that you " may receive in the mail after your visit with us. Thank you!             Your Updated Medication List - Protect others around you: Learn how to safely use, store and throw away your medicines at www.disposemymeds.org.          This list is accurate as of 7/13/18  8:45 AM.  Always use your most recent med list.                   Brand Name Dispense Instructions for use Diagnosis    aspirin 81 MG tablet      Take 81 mg by mouth daily        clopidogrel 75 MG tablet    PLAVIX    30 tablet    Take 1 tablet (75 mg) by mouth daily    Coronary artery disease involving native coronary artery without angina pectoris       ezetimibe-simvastatin 10-20 MG per tablet    VYTORIN    90 tablet    Take 1 tablet by mouth At Bedtime    Mixed hyperlipidemia       lisinopril 5 MG tablet    PRINIVIL/ZESTRIL    90 tablet    Take 1 tablet (5 mg) by mouth daily    Coronary artery disease involving native coronary artery of native heart without angina pectoris       metFORMIN 500 MG tablet    GLUCOPHAGE    90 tablet    Take 1 tablet (500 mg) by mouth daily (with breakfast)    Impaired fasting glucose       metoprolol succinate 100 MG 24 hr tablet    TOPROL XL    90 tablet    Take 1 tablet (100 mg) by mouth daily    Coronary artery disease involving native coronary artery of native heart without angina pectoris       nitroGLYcerin 0.4 MG sublingual tablet    NITROSTAT    25 tablet    Place 1 tablet (0.4 mg) under the tongue every 5 minutes as needed for chest pain May repeat X 2. If no relief after 3 tablets call 911    Coronary artery disease involving native coronary artery of native heart without angina pectoris       sildenafil 20 MG tablet    REVATIO    60 tablet    Take 2 tablets (40 mg) by mouth daily as needed Never use with nitroglycerin, terazosin or doxazosin.    Erectile dysfunction, unspecified erectile dysfunction type

## 2018-07-13 NOTE — LETTER
7/13/2018      Kvng Smith MD  [address] [place] [address]      RE: Guillermo Ford       Dear Colleague,    I had the pleasure of seeing Guillermo Ford in the [facility].    Service Date: 07/13/2018      HISTORY OF PRESENT ILLNESS:  The patient is a 67-year-old overweight white male initially hospitalized at [facility] in 2005 with evidence of an acute inferior wall myocardial infarction.  after being transferred from [facility].  Cardiac catheterization and coronary angiography revealed severe stenosis of the distal right coronary artery involving the PDA which was treated with PTCA and stent placement with an old angioplasty stent noted in the ostium of the posterior descending branch.  There had been previous hypokinesis of the inferior wall.  Ejection fraction at that time was 35%-40%.        The patient had a stress echocardiogram late last year that showed him going 10 minutes on Maksim protocol, but not achieving target heart rate due to beta blocker therapy.  There was no significant regional wall motion abnormality with a right bundle branch block pattern on electrocardiogram.        He has felt well since his last clinic visit with no significant symptoms of chest discomfort, shortness of breath, dizziness, palpitations, nausea, vomiting, diaphoresis or syncope.  He denies PND, orthopnea, fever, chills or sweats.  Echocardiogram this month demonstrated a normal-sized left ventricle with intact systolic function with ejection fraction estimated at 60%-65% without obvious regional wall motion abnormality.  There was mild dilatation of left atrium, mild tricuspid insufficiency and no other significant valvular insufficiency or stenosis.      MEDICATIONS:   1.  Aspirin 81 mg a day.   2.  Clopidogrel 75 mg a day.   3.  Ezetimibe/simvastatin, 10/20 mg a day.   4.  Lisinopril 5 mg a day.   5.  Metformin 500 mg a day.   6.  Metoprolol   mg a day.   7.  Nitroglycerin 0.4 mg sublingual p.r.n.    8.  Sildenafil 40 mg as needed.      LABORATORY DATA:  Demonstrated a cholesterol 141, HDL 47, LDL 53, triglyceride 205.  Sodium 135, potassium 4.0, BUN 15, creatinine 1.0.  Hemoglobin A1c not done.  ALT less than 5.      The patient presents to Cardiology Clinic for followup of his ischemic heart disease.  He is tolerating his medications without significant problems and participates in activities without significant restriction.      PHYSICAL EXAMINATION:   VITAL SIGNS:  Blood pressure 122/76 with a heart rate of 60-70 and regular.  Weight was 182 pounds, which is up 3 pounds from previous weight.   NECK:  Without jugular venous distention, carotid bruit or palpable thyroid.   CHEST:  Essentially clear to percussion and auscultation with slight decreased breath sounds at the bases.   CARDIAC:  Regular rhythm, soft S4 gallop, 1-2/6 systolic murmur at the left sternal border with minimal radiation.  No diastolic murmur, rub or S3.   EXTREMITIES:  Without cyanosis or edema.      CLINICAL IMPRESSION:   1.  Stable cardiac condition.   2.  History of ischemic heart disease, status post inferior wall myocardial infarction with PTCA and stent of the proximal posterior descending branch of the right coronary artery in 2005.   3.  History of hyperlipidemia, at goal.   4.  Hypertension, adequate control.   5.  Sleep apnea.   6.  Gastroesophageal reflux disease.   7.  Gout.   8.  Overweight.   9.  Type 2 diabetes mellitus, non-insulin dependent.      DISCUSSION:  The patient has done well from a cardiac viewpoint.  He unfortunately has not been able to lose a significant amount of weight.  He has not had prolonged symptoms of angina pectoris or congestive heart failure.  His blood pressure appears to be well controlled at the present time.  He has not required significant adjustment of his medications.  Otherwise, he appears to be doing well.  He attempts to  limit his caffeine and salt intake.  He will need close followup of serum lipids, basic metabolic panel and blood pressure.      RECOMMENDATIONS:   1.  Continue present medications.   2.  Diet and exercise program to lose weight, avoiding caffeine and salt.   3.  Aggressive diabetic management.   4.  Close followup of serum lipids, basic metabolic panel and blood pressure with followup with Alexus Gagnon in 2-3 months.   5.  Routine medical followup.   6.  Cardiology followup in 4-6 months.      cc:      [place] [address]         ANGELINA RAMON MD, [facility]             D: 2018   T: 2018   MT:       Name:     LOLA GRIFFIN   MRN:      [id number]        Account:      [id number]   :      1950           Service Date: 2018      Document: [id number]       Outpatient Encounter Prescriptions as of 2018   Medication Sig Dispense Refill     aspirin 81 MG tablet Take 81 mg by mouth daily        clopidogrel (PLAVIX) 75 MG tablet Take 1 tablet (75 mg) by mouth daily 30 tablet 0     ezetimibe-simvastatin (VYTORIN) 10-20 MG per tablet Take 1 tablet by mouth At Bedtime 90 tablet 3     lisinopril (PRINIVIL/ZESTRIL) 5 MG tablet Take 1 tablet (5 mg) by mouth daily 90 tablet 3     metFORMIN (GLUCOPHAGE) 500 MG tablet Take 1 tablet (500 mg) by mouth daily (with breakfast) 90 tablet 3     metoprolol (TOPROL XL) 100 MG 24 hr tablet Take 1 tablet (100 mg) by mouth daily 90 tablet 3     nitroglycerin (NITROSTAT) 0.4 MG sublingual tablet Place 1 tablet (0.4 mg) under the tongue every 5 minutes as needed for chest pain May repeat X 2. If no relief after 3 tablets call 911 25 tablet 2     sildenafil (REVATIO/VIAGRA) 20 MG tablet Take 2 tablets (40 mg) by mouth daily as needed Never use with nitroglycerin, terazosin or doxazosin. 60 tablet 11     No facility-administered encounter medications on file as of 2018.        Again,  thank you for allowing me to participate in the care of your patient.      Sincerely,    Hemal Quezada MD     [facility]

## 2018-07-13 NOTE — PROGRESS NOTES
Service Date: 07/13/2018      HISTORY OF PRESENT ILLNESS:  The patient is a 67-year-old overweight white male initially hospitalized at [facility] in 2005 with evidence of an acute inferior wall myocardial infarction.  after being transferred from [facility].  Cardiac catheterization and coronary angiography revealed severe stenosis of the distal right coronary artery involving the PDA which was treated with PTCA and stent placement with an old angioplasty stent noted in the ostium of the posterior descending branch.  There had been previous hypokinesis of the inferior wall.  Ejection fraction at that time was 35%-40%.        The patient had a stress echocardiogram late last year that showed him going 10 minutes on Maksim protocol, but not achieving target heart rate due to beta blocker therapy.  There was no significant regional wall motion abnormality with a right bundle branch block pattern on electrocardiogram.        He has felt well since his last clinic visit with no significant symptoms of chest discomfort, shortness of breath, dizziness, palpitations, nausea, vomiting, diaphoresis or syncope.  He denies PND, orthopnea, fever, chills or sweats.  Echocardiogram this month demonstrated a normal-sized left ventricle with intact systolic function with ejection fraction estimated at 60%-65% without obvious regional wall motion abnormality.  There was mild dilatation of left atrium, mild tricuspid insufficiency and no other significant valvular insufficiency or stenosis.      MEDICATIONS:   1.  Aspirin 81 mg a day.   2.  Clopidogrel 75 mg a day.   3.  Ezetimibe/simvastatin, 10/20 mg a day.   4.  Lisinopril 5 mg a day.   5.  Metformin 500 mg a day.   6.  Metoprolol  mg a day.   7.  Nitroglycerin 0.4 mg sublingual p.r.n.    8.  Sildenafil 40 mg as needed.      LABORATORY DATA:  Demonstrated a cholesterol 141, HDL 47, LDL 53, triglyceride 205.  Sodium 135, potassium 4.0, BUN 15,  creatinine 1.0.  Hemoglobin A1c not done.  ALT less than 5.      The patient presents to Cardiology Clinic for followup of his ischemic heart disease.  He is tolerating his medications without significant problems and participates in activities without significant restriction.      PHYSICAL EXAMINATION:   VITAL SIGNS:  Blood pressure 122/76 with a heart rate of 60-70 and regular.  Weight was 182 pounds, which is up 3 pounds from previous weight.   NECK:  Without jugular venous distention, carotid bruit or palpable thyroid.   CHEST:  Essentially clear to percussion and auscultation with slight decreased breath sounds at the bases.   CARDIAC:  Regular rhythm, soft S4 gallop, 1-2/6 systolic murmur at the left sternal border with minimal radiation.  No diastolic murmur, rub or S3.   EXTREMITIES:  Without cyanosis or edema.      CLINICAL IMPRESSION:   1.  Stable cardiac condition.   2.  History of ischemic heart disease, status post inferior wall myocardial infarction with PTCA and stent of the proximal posterior descending branch of the right coronary artery in 2005.   3.  History of hyperlipidemia, at goal.   4.  Hypertension, adequate control.   5.  Sleep apnea.   6.  Gastroesophageal reflux disease.   7.  Gout.   8.  Overweight.   9.  Type 2 diabetes mellitus, non-insulin dependent.      DISCUSSION:  The patient has done well from a cardiac viewpoint.  He unfortunately has not been able to lose a significant amount of weight.  He has not had prolonged symptoms of angina pectoris or congestive heart failure.  His blood pressure appears to be well controlled at the present time.  He has not required significant adjustment of his medications.  Otherwise, he appears to be doing well.  He attempts to limit his caffeine and salt intake.  He will need close followup of serum lipids, basic metabolic panel and blood pressure.      RECOMMENDATIONS:   1.  Continue present medications.   2.  Diet and exercise program to lose  weight, avoiding caffeine and salt.   3.  Aggressive diabetic management.   4.  Close followup of serum lipids, basic metabolic panel and blood pressure with followup with Alexus Gagnon in 2-3 months.   5.  Routine medical followup.   6.  Cardiology followup in 4-6 months.      cc:      Kvng Smith MD    [address] [address]         ANGELINA RAMON MD, [facility]             D: 2018   T: 2018   MT:       Name:     LOLA GRIFFIN   MRN:      [id number]        Account:      [id number]   :      1950           Service Date: 2018      Document: [id number]

## 2018-07-13 NOTE — LETTER
7/13/2018    Kvng Smith MD  [address] [place] [address]    RE: Guillermo Ford       Dear Colleague,    I had the pleasure of seeing Guillermo Ford in the [facility].    Service Date: 07/13/2018      HISTORY OF PRESENT ILLNESS:  The patient is a 67-year-old overweight white male initially hospitalized at [facility] in 2005 with evidence of an acute inferior wall myocardial infarction.  after being transferred from [facility].  Cardiac catheterization and coronary angiography revealed severe stenosis of the distal right coronary artery involving the PDA which was treated with PTCA and stent placement with an old angioplasty stent noted in the ostium of the posterior descending branch.  There had been previous hypokinesis of the inferior wall.  Ejection fraction at that time was 35%-40%.        The patient had a stress echocardiogram late last year that showed him going 10 minutes on Maksim protocol, but not achieving target heart rate due to beta blocker therapy.  There was no significant regional wall motion abnormality with a right bundle branch block pattern on electrocardiogram.        He has felt well since his last clinic visit with no significant symptoms of chest discomfort, shortness of breath, dizziness, palpitations, nausea, vomiting, diaphoresis or syncope.  He denies PND, orthopnea, fever, chills or sweats.  Echocardiogram this month demonstrated a normal-sized left ventricle with intact systolic function with ejection fraction estimated at 60%-65% without obvious regional wall motion abnormality.  There was mild dilatation of left atrium, mild tricuspid insufficiency and no other significant valvular insufficiency or stenosis.      MEDICATIONS:   1.  Aspirin 81 mg a day.   2.  Clopidogrel 75 mg a day.   3.  Ezetimibe/simvastatin, 10/20 mg a day.   4.  Lisinopril 5 mg a day.   5.  Metformin 500 mg a day.   6.  Metoprolol XL  100 mg a day.   7.  Nitroglycerin 0.4 mg sublingual p.r.n.    8.  Sildenafil 40 mg as needed.      LABORATORY DATA:  Demonstrated a cholesterol 141, HDL 47, LDL 53, triglyceride 205.  Sodium 135, potassium 4.0, BUN 15, creatinine 1.0.  Hemoglobin A1c not done.  ALT less than 5.      The patient presents to Cardiology Clinic for followup of his ischemic heart disease.  He is tolerating his medications without significant problems and participates in activities without significant restriction.      PHYSICAL EXAMINATION:   VITAL SIGNS:  Blood pressure 122/76 with a heart rate of 60-70 and regular.  Weight was 182 pounds, which is up 3 pounds from previous weight.   NECK:  Without jugular venous distention, carotid bruit or palpable thyroid.   CHEST:  Essentially clear to percussion and auscultation with slight decreased breath sounds at the bases.   CARDIAC:  Regular rhythm, soft S4 gallop, 1-2/6 systolic murmur at the left sternal border with minimal radiation.  No diastolic murmur, rub or S3.   EXTREMITIES:  Without cyanosis or edema.      CLINICAL IMPRESSION:   1.  Stable cardiac condition.   2.  History of ischemic heart disease, status post inferior wall myocardial infarction with PTCA and stent of the proximal posterior descending branch of the right coronary artery in 2005.   3.  History of hyperlipidemia, at goal.   4.  Hypertension, adequate control.   5.  Sleep apnea.   6.  Gastroesophageal reflux disease.   7.  Gout.   8.  Overweight.   9.  Type 2 diabetes mellitus, non-insulin dependent.      DISCUSSION:  The patient has done well from a cardiac viewpoint.  He unfortunately has not been able to lose a significant amount of weight.  He has not had prolonged symptoms of angina pectoris or congestive heart failure.  His blood pressure appears to be well controlled at the present time.  He has not required significant adjustment of his medications.  Otherwise, he appears to be doing well.  He attempts to limit  his caffeine and salt intake.  He will need close followup of serum lipids, basic metabolic panel and blood pressure.      RECOMMENDATIONS:   1.  Continue present medications.   2.  Diet and exercise program to lose weight, avoiding caffeine and salt.   3.  Aggressive diabetic management.   4.  Close followup of serum lipids, basic metabolic panel and blood pressure with followup with Alexus Gagnon in 2-3 months.   5.  Routine medical followup.   6.  Cardiology followup in 4-6 months.      cc:      Kvng Smith MD    [facility]   [address] [place] [address]         ANGELINA RAMON MD, [facility]             D: 2018   T: 2018   MT:       Name:     LOLA GRIFFIN   MRN:      [id number]        Account:      [id number]   :      1950           Service Date: 2018      Document: [id number]       Thank you for allowing me to participate in the care of your patient.      Sincerely,     Angelina Ramon MD     [facility]    cc:   Angelina Ramon MD  [address] [place] [id number]

## 2018-08-07 DIAGNOSIS — I25.10 CORONARY ARTERY DISEASE INVOLVING NATIVE CORONARY ARTERY WITHOUT ANGINA PECTORIS: ICD-10-CM

## 2018-08-07 RX ORDER — CLOPIDOGREL BISULFATE 75 MG/1
75 TABLET ORAL DAILY
Qty: 30 TABLET | Refills: 3 | Status: SHIPPED | OUTPATIENT
Start: 2018-08-07 | End: 2018-11-27

## 2018-09-01 DIAGNOSIS — I25.10 CORONARY ARTERY DISEASE INVOLVING NATIVE CORONARY ARTERY OF NATIVE HEART WITHOUT ANGINA PECTORIS: ICD-10-CM

## 2018-09-01 DIAGNOSIS — R73.01 IMPAIRED FASTING GLUCOSE: ICD-10-CM

## 2018-09-01 NOTE — TELEPHONE ENCOUNTER
Requested Prescriptions   Pending Prescriptions Disp Refills     metFORMIN (GLUCOPHAGE) 500 MG tablet [Pharmacy Med Name: MetFORMIN HCl Oral Tablet 500 MG]  Last Written Prescription Date:  8/18/17  Last Fill Quantity: 90,  # refills: 3   Last office visit: 8/18/2017 with prescribing provider:  8/18/17   Future Office Visit:   Next 5 appointments (look out 90 days)     Oct 04, 2018 10:00 AM CDT   Return Visit with Alexus Gagnon PA-C   [facility] ([facility])    [address]  [facility] [address] [phone]                  30 tablet 2     Sig: Take 1 tablet (500mg) by mouth daily with breakfast.    Biguanide Agents Failed    9/1/2018  7:01 AM       Failed - Patient has had a Microalbumin in the past 15 mos.    Recent Labs   Lab Test  08/02/16   1324   MICROL  <5   UMALCR  Unable to calculate due to low value            Failed - Patient has documented A1c within the specified period of time.    If HgbA1C is 8 or greater, it needs to be on file within the past 3 months.  If less than 8, must be on file within the past 6 months.     Recent Labs   Lab Test  08/18/17   1011   A1C  5.9            Passed - Blood pressure less than 140/90 in past 6 months    BP Readings from Last 3 Encounters:   07/13/18 122/76   06/19/18 124/74   02/06/18 134/74                Passed - Patient has documented LDL within the past 12 mos.    Recent Labs   Lab Test  07/10/18   0804   LDL  53            Passed - Patient is age 10 or older       Passed - Patient's CR is NOT>1.4 OR Patient's EGFR is NOT<45 within past 12 mos.    Recent Labs   Lab Test  07/10/18   0804   GFRESTIMATED  75   GFRESTBLACK  >90       Recent Labs   Lab Test  07/10/18   0804   CR  1.00            Passed - Patient does NOT have a diagnosis of CHF.       Passed - Recent (6 mo) or future (30 days) visit within the authorizing provider's specialty    Patient had office visit in the last 6  "months or has a visit in the next 30 days with authorizing provider or within the authorizing provider's specialty.  See \"Patient Info\" tab in inbasket, or \"Choose Columns\" in Meds & Orders section of the refill encounter.            lisinopril (PRINIVIL/ZESTRIL) 5 MG tablet [Pharmacy Med Name: Lisinopril Oral Tablet 5 MG]  Last Written Prescription Date:  8/18/17  Last Fill Quantity: 90,  # refills: 3   Last office visit: 8/18/2017 with prescribing provider:  8/18/17   Future Office Visit:   Next 5 appointments (look out 90 days)     Oct 04, 2018 10:00 AM CDT   Return Visit with Alexus Gagnon PA-C   [facility] ([facility])    [address] [address] [address] [phone]                  30 tablet 2     Sig: TAKE ONE TABLET BY MOUTH ONE TIME DAILY    ACE Inhibitors (Including Combos) Protocol Passed    9/1/2018  7:01 AM       Passed - Blood pressure under 140/90 in past 12 months    BP Readings from Last 3 Encounters:   07/13/18 122/76   06/19/18 124/74   02/06/18 134/74                Passed - Recent (12 mo) or future (30 days) visit within the authorizing provider's specialty    Patient had office visit in the last 12 months or has a visit in the next 30 days with authorizing provider or within the authorizing provider's specialty.  See \"Patient Info\" tab in inbasket, or \"Choose Columns\" in Meds & Orders section of the refill encounter.           Passed - Patient is age 18 or older       Passed - Normal serum creatinine on file in past 12 months    Recent Labs   Lab Test  07/10/18   0804   CR  1.00            Passed - Normal serum potassium on file in past 12 months    Recent Labs   Lab Test  07/10/18   0804   POTASSIUM  4.0             metoprolol succinate (TOPROL-XL) 100 MG 24 hr tablet [Pharmacy Med Name: Metoprolol Succinate ER Oral Tablet Extended Release 24 Hour  Last Written Prescription Date:  8/18/17  Last Fill Quantity: " "90,  # refills: 3   Last office visit: 8/18/2017 with prescribing provider:  8/18/17   Future Office Visit:   Next 5 appointments (look out 90 days)     Oct 04, 2018 10:00 AM CDT   Return Visit with Alexus Gagnon PA-C   [facility] ([facility])    [address]  [facility] [address]   [phone]                  100 MG] 30 tablet 2     Sig: TAKE ONE TABLET BY MOUTH ONE TIME DAILY    Beta-Blockers Protocol Passed    9/1/2018  7:01 AM       Passed - Blood pressure under 140/90 in past 12 months    BP Readings from Last 3 Encounters:   07/13/18 122/76   06/19/18 124/74   02/06/18 134/74                Passed - Patient is age 6 or older       Passed - Recent (12 mo) or future (30 days) visit within the authorizing provider's specialty    Patient had office visit in the last 12 months or has a visit in the next 30 days with authorizing provider or within the authorizing provider's specialty.  See \"Patient Info\" tab in inbasket, or \"Choose Columns\" in Meds & Orders section of the refill encounter.              "

## 2018-09-05 RX ORDER — LISINOPRIL 5 MG/1
TABLET ORAL
Qty: 90 TABLET | Refills: 3 | Status: SHIPPED | OUTPATIENT
Start: 2018-09-05 | End: 2019-01-12

## 2018-09-05 RX ORDER — METOPROLOL SUCCINATE 100 MG/1
TABLET, EXTENDED RELEASE ORAL
Qty: 90 TABLET | Refills: 3 | Status: SHIPPED | OUTPATIENT
Start: 2018-09-05 | End: 2019-01-12

## 2018-09-08 DIAGNOSIS — N52.9 ERECTILE DYSFUNCTION, UNSPECIFIED ERECTILE DYSFUNCTION TYPE: ICD-10-CM

## 2018-09-09 NOTE — TELEPHONE ENCOUNTER
"Requested Prescriptions   Pending Prescriptions Disp Refills     sildenafil (REVATIO) 20 MG tablet [Pharmacy Med Name: Sildenafil Citrate Oral Tablet 20 MG]  Last Written Prescription Date:  08/18/2017  Last Fill Quantity: 60,  # refills: 11   Last office visit: 8/18/2017 with prescribing provider:  NEGIN   Future Office Visit:   Next 5 appointments (look out 90 days)     Oct 04, 2018 10:00 AM CDT   Return Visit with Alexus Gagnon PA-C   [facility] ([facility])    [address]  [facility] [address] [phone]                  60 tablet 10     Sig: TAKE 2 TABLETS BY MOUTH DAILY AS NEEDED. NEVER USE WITH NITROGLYCERIN, TERAZOSIN OR DOXAZOSIN    Erectile Dysfuction Protocol Failed    9/8/2018  3:13 PM       Failed - Absence of nitrates on medication list       Passed - Absence of Alpha Blockers on Med list       Passed - Recent (12 mo) or future (30 days) visit within the authorizing provider's specialty    Patient had office visit in the last 12 months or has a visit in the next 30 days with authorizing provider or within the authorizing provider's specialty.  See \"Patient Info\" tab in inbasket, or \"Choose Columns\" in Meds & Orders section of the refill encounter.           Passed - Patient is age 18 or older          "

## 2018-09-11 RX ORDER — SILDENAFIL CITRATE 20 MG/1
TABLET ORAL
Qty: 60 TABLET | Refills: 10 | Status: SHIPPED | OUTPATIENT
Start: 2018-09-11 | End: 2019-11-12

## 2018-09-11 NOTE — TELEPHONE ENCOUNTER
Routing refill request to provider for review/approval because:  Patient has Nitroglycerin listed on medication list  Diane Senior RN - Triage  [facility]

## 2018-10-04 ENCOUNTER — OFFICE VISIT (OUTPATIENT)
Dept: CARDIOLOGY | Facility: CLINIC | Age: 68
End: 2018-10-04
Attending: INTERNAL MEDICINE
Payer: COMMERCIAL

## 2018-10-04 VITALS
HEIGHT: 65 IN | DIASTOLIC BLOOD PRESSURE: 78 MMHG | SYSTOLIC BLOOD PRESSURE: 126 MMHG | WEIGHT: 185 LBS | HEART RATE: 56 BPM | BODY MASS INDEX: 30.82 KG/M2

## 2018-10-04 DIAGNOSIS — E78.2 MIXED HYPERLIPIDEMIA: ICD-10-CM

## 2018-10-04 DIAGNOSIS — R03.0 ELEVATED BLOOD PRESSURE READING WITHOUT DIAGNOSIS OF HYPERTENSION: ICD-10-CM

## 2018-10-04 DIAGNOSIS — I25.10 CORONARY ARTERY DISEASE INVOLVING NATIVE CORONARY ARTERY OF NATIVE HEART WITHOUT ANGINA PECTORIS: ICD-10-CM

## 2018-10-04 DIAGNOSIS — I25.10 CORONARY ARTERY DISEASE INVOLVING NATIVE CORONARY ARTERY OF NATIVE HEART WITHOUT ANGINA PECTORIS: Primary | ICD-10-CM

## 2018-10-04 LAB
ANION GAP SERPL CALCULATED.3IONS-SCNC: 6 MMOL/L (ref 3–14)
BUN SERPL-MCNC: 17 MG/DL (ref 7–30)
CALCIUM SERPL-MCNC: 9.1 MG/DL (ref 8.5–10.1)
CHLORIDE SERPL-SCNC: 104 MMOL/L (ref 94–109)
CO2 SERPL-SCNC: 26 MMOL/L (ref 20–32)
CREAT SERPL-MCNC: 0.92 MG/DL (ref 0.66–1.25)
GFR SERPL CREATININE-BSD FRML MDRD: 82 ML/MIN/1.7M2
GLUCOSE SERPL-MCNC: 120 MG/DL (ref 70–99)
POTASSIUM SERPL-SCNC: 4.2 MMOL/L (ref 3.4–5.3)
SODIUM SERPL-SCNC: 136 MMOL/L (ref 133–144)

## 2018-10-04 PROCEDURE — 36415 COLL VENOUS BLD VENIPUNCTURE: CPT | Performed by: INTERNAL MEDICINE

## 2018-10-04 PROCEDURE — 99213 OFFICE O/P EST LOW 20 MIN: CPT | Performed by: PHYSICIAN ASSISTANT

## 2018-10-04 PROCEDURE — 80048 BASIC METABOLIC PNL TOTAL CA: CPT | Performed by: INTERNAL MEDICINE

## 2018-10-04 NOTE — PATIENT INSTRUCTIONS
Thank you for your [facility] visit today. Your provider has recommended the following:  Medication Changes:  No changes today  Recommendations:  Fasting lab work prior to your next visit unless you have it before your annual visit then you don't need it repeated  Follow-up:  See Dr Muro for cardiology follow up in Jan.  Try calling in next week    Reminder:  Please bring in your current medication list or your medication, over the counter supplements and vitamin bottles as we will review these at each office visit.

## 2018-10-04 NOTE — LETTER
"10/4/2018    Kvng Smith MD  [address] [place] [address]    RE: Guillermo Ford       Dear Colleague,    I had the pleasure of seeing Guillermo Ford in the [facility].    Please see separate dictation for HPI, PHYSICAL EXAM AND IMPRESSION/PLAN.    CURRENT MEDICATIONS:  Current Outpatient Prescriptions   Medication Sig Dispense Refill     aspirin 81 MG tablet Take 81 mg by mouth daily        clopidogrel (PLAVIX) 75 MG tablet Take 1 tablet (75 mg) by mouth daily 30 tablet 3     ezetimibe-simvastatin (VYTORIN) 10-20 MG per tablet Take 1 tablet by mouth At Bedtime 90 tablet 3     lisinopril (PRINIVIL/ZESTRIL) 5 MG tablet TAKE ONE TABLET BY MOUTH ONE TIME DAILY  90 tablet 3     metFORMIN (GLUCOPHAGE) 500 MG tablet Take 1 tablet (500mg) by mouth daily with breakfast. 90 tablet 3     metoprolol succinate (TOPROL-XL) 100 MG 24 hr tablet TAKE ONE TABLET BY MOUTH ONE TIME DAILY  90 tablet 3     nitroglycerin (NITROSTAT) 0.4 MG sublingual tablet Place 1 tablet (0.4 mg) under the tongue every 5 minutes as needed for chest pain May repeat X 2. If no relief after 3 tablets call 911 25 tablet 2     sildenafil (REVATIO) 20 MG tablet TAKE 2 TABLETS BY MOUTH DAILY AS NEEDED. NEVER USE WITH NITROGLYCERIN, TERAZOSIN OR DOXAZOSIN 60 tablet 10       ALLERGIES:   No Known Allergies    PAST MEDICAL HISTORY:  Past Medical History:   Diagnosis Date     Alcohol Dependence in Remission     sober since 1996     CAD (coronary artery disease)     cardiac cath 2005: stent to PDA     Diverticulitis of colon 6/9/2008    Diagnosed after diarrheal episode from colchicine; \"Probable\" on CT scan 6/08.     GERD (gastroesophageal reflux disease)      Gout      History of acute inferior wall MI     2005     Left ventricular diastolic dysfunction      Mixed hyperlipidemia      Sleep apnea     C PAP       PAST SURGICAL HISTORY:  Past Surgical History:   Procedure Laterality Date     HC CORONARY STENT PERCUT, " INITIAL VESSEL  4/18/2005    PTCA with intracoronary stent placement of, distal RCA and ostial PDA     HC REPAIR OF NASAL SEPTUM      Septoplasty and uvulectomy       SOCIAL HISTORY:  Social History     Social History     Marital status:      Spouse name: N/A     Number of children: N/A     Years of education: N/A     Social History Main Topics     Smoking status: Former Smoker     Quit date: 4/27/2000     Smokeless tobacco: Never Used      Comment: previous 2 ppd smoker     Alcohol use No     Drug use: No     Sexual activity: Yes     Partners: Female     Other Topics Concern     Parent/Sibling W/ Cabg, Mi Or Angioplasty Before 65f 55m? No     Caffeine Concern No     coffee 1/2 decaf- 4 cups a day     Sleep Concern No     Stress Concern No     Weight Concern No     Special Diet No     Exercise Yes     tredmill 1-2 days a week     Seat Belt Yes     Social History Narrative       FAMILY HISTORY:  Family History   Problem Relation Age of Onset     C.A.D. Mother      C.A.D. Father      Diabetes Brother      unsure of type 1/2     Family History Negative Sister      Family History Negative Son      Family History Negative Daughter      Family History Negative Brother      Cancer - colorectal No family hx of      Prostate Cancer No family hx of        Review of Systems:  Skin:  Negative       Eyes:  Negative      ENT:  Negative      Respiratory:  Positive for sleep apnea;CPAP     Cardiovascular:  Negative      Gastroenterology: Negative      Genitourinary:  Negative      Musculoskeletal:  Positive for arthritis    Neurologic:  Negative      Psychiatric:  Negative      Heme/Lymph/Imm:  Negative      Endocrine:  Negative         Reviewed. Remainder of the note dictated.    Alexus Gagnon PA-C        Service Date: 10/04/2018      HISTORY OF PRESENT ILLNESS:  Guillermo is a pleasant 68-year-old gentleman who presents to the office today for a 3-month followup.      His cardiac history includes prior inferior wall  myocardial infarction in 2005 at which time he underwent coronary angiography revealing a severe stenosis in the distal right coronary artery involving the PDA.  He underwent stent placement in the RCA along with angioplasty of the ostium of the PDA.  His EF was reduced to 35%-40% around that time.  Fortunately with revascularization, his EF normalized.  His last nuclear stress test in early 2015 showed a small area of infarction in the inferior wall without any vicenta-infarct ischemia.      Again, he returns to the Cardiology office today for routine followup.  He states he has been doing well.  He denies any chest discomfort or significant dyspnea on exertion.  No lower extremity edema, orthopnea or PND.  He does have sleep apnea and does wear his CPAP.      CURRENT CARDIAC MEDICATIONS:   1.  Aspirin 81 mg daily.   2.  Clopidogrel 75 mg daily.    3.  Zetia/simvastatin 10-20 one tablet nightly.    4.  Lisinopril 5 mg daily.   5.  Toprol- mg daily.   6.  Sublingual nitro p.r.n.      The remainder of his medications, allergies and review of systems were reviewed and as are documented separately.      PHYSICAL EXAMINATION:   GENERAL:  The patient is a pleasant 68-year-old gentleman who appears his stated age.  He is in no apparent distress.   VITAL SIGNS:  His blood pressure is 126/78, pulse is 56, weight is 185 pounds which is up a couple of pounds since his last office visit.   PULMONARY:  Breathing is nonlabored.  Lungs are clear to auscultation bilaterally.   CARDIAC:  Reveals a regular rate and rhythm.  He does have an occasional ectopic beat.   NEUROLOGIC:  Alert and oriented.      He had fasting blood work today showing a sodium of 136, potassium of 4.2, BUN of 17 and creatinine of 0.92.      His last echocardiogram was in July of this year.  He had normal LV size and systolic function with an EF of 60%-65%.  No wall motion abnormalities were noted.  No valvular heart disease was noted.      ASSESSMENT AND  PLAN:  Mr. Ford is a pleasant 68-year-old gentleman with a history of coronary artery disease with a prior inferior wall myocardial infarction in  at which time he underwent stent placement in the RCA and angioplasty of the ostium of the PDA.  He overall has been doing well since that time.  His blood pressure and his dyslipidemia appear fairly well controlled.  I did not make any changes to his medical therapy today, although at some point in the future we may consider discontinuing his clopidogrel.  On the other hand, he has been on it for more than a decade now without any complications.  He is aware that he should attempt weight loss, but states that he struggles to make the needed changes.      He will follow up in the Cardiology Clinic with Dr. Muro as previously recommended by Dr. Quezada.  We did discuss potentially pushing out his cardiology visit a bit further than 3 months from now, but at this time the patient would like to keep the 3-month followup, then possibly extend his visits following that.      Thank you for allowing me to participate in the care of this pleasant patient.         ALEXUS TABOR PA-C             D: 10/04/2018   T: 10/04/2018   MT: SHAYAN      Name:     LOLA FORD   MRN:      [id number]        Account:      [id number]   :      1950           Service Date: 10/04/2018      Document: [id number]       Thank you for allowing me to participate in the care of your patient.      Sincerely,     Alexus Tabor PA-C     [facility]    cc:   Hemal Quezada MD  [address]  [place] [id number]

## 2018-10-04 NOTE — PROGRESS NOTES
Service Date: 10/04/2018      HISTORY OF PRESENT ILLNESS:  Guillermo is a pleasant 68-year-old gentleman who presents to the office today for a 3-month followup.      His cardiac history includes prior inferior wall myocardial infarction in 2005 at which time he underwent coronary angiography revealing a severe stenosis in the distal right coronary artery involving the PDA.  He underwent stent placement in the RCA along with angioplasty of the ostium of the PDA.  His EF was reduced to 35%-40% around that time.  Fortunately with revascularization, his EF normalized.  His last nuclear stress test in early 2015 showed a small area of infarction in the inferior wall without any vicenta-infarct ischemia.      Again, he returns to the Cardiology office today for routine followup.  He states he has been doing well.  He denies any chest discomfort or significant dyspnea on exertion.  No lower extremity edema, orthopnea or PND.  He does have sleep apnea and does wear his CPAP.      CURRENT CARDIAC MEDICATIONS:   1.  Aspirin 81 mg daily.   2.  Clopidogrel 75 mg daily.    3.  Zetia/simvastatin 10-20 one tablet nightly.    4.  Lisinopril 5 mg daily.   5.  Toprol- mg daily.   6.  Sublingual nitro p.r.n.      The remainder of his medications, allergies and review of systems were reviewed and as are documented separately.      PHYSICAL EXAMINATION:   GENERAL:  The patient is a pleasant 68-year-old gentleman who appears his stated age.  He is in no apparent distress.   VITAL SIGNS:  His blood pressure is 126/78, pulse is 56, weight is 185 pounds which is up a couple of pounds since his last office visit.   PULMONARY:  Breathing is nonlabored.  Lungs are clear to auscultation bilaterally.   CARDIAC:  Reveals a regular rate and rhythm.  He does have an occasional ectopic beat.   NEUROLOGIC:  Alert and oriented.      He had fasting blood work today showing a sodium of 136, potassium of 4.2, BUN of 17 and creatinine of 0.92.      His  last echocardiogram was in July of this year.  He had normal LV size and systolic function with an EF of 60%-65%.  No wall motion abnormalities were noted.  No valvular heart disease was noted.      ASSESSMENT AND PLAN:  Mr. Ford is a pleasant 68-year-old gentleman with a history of coronary artery disease with a prior inferior wall myocardial infarction in  at which time he underwent stent placement in the RCA and angioplasty of the ostium of the PDA.  He overall has been doing well since that time.  His blood pressure and his dyslipidemia appear fairly well controlled.  I did not make any changes to his medical therapy today, although at some point in the future we may consider discontinuing his clopidogrel.  On the other hand, he has been on it for more than a decade now without any complications.  He is aware that he should attempt weight loss, but states that he struggles to make the needed changes.      He will follow up in the Cardiology Clinic with Dr. Muro as previously recommended by Dr. Quezada.  We did discuss potentially pushing out his cardiology visit a bit further than 3 months from now, but at this time the patient would like to keep the 3-month followup, then possibly extend his visits following that.      Thank you for allowing me to participate in the care of this pleasant patient.         ICTLALI TABOR PA-C             D: 10/04/2018   T: 10/04/2018   MT: SHAYAN      Name:     LOLA FORD   MRN:      [id number]        Account:      [id number]   :      1950           Service Date: 10/04/2018      Document: [id number]

## 2018-10-04 NOTE — MR AVS SNAPSHOT
After Visit Summary   10/4/2018    Guillermo Ford    MRN: [id number]           Patient Information     Date Of Birth          1950        Visit Information        Provider Department      10/4/2018 10:00 AM Alexus Gagnon PA-C [facility]        Today's Diagnoses     Coronary artery disease involving native coronary artery of native heart without angina pectoris        Mixed hyperlipidemia        Elevated blood pressure reading without diagnosis of hypertension          Care Instructions    Thank you for your  Heart Care visit today. Your provider has recommended the following:  Medication Changes:  No changes today  Recommendations:  Fasting lab work prior to your next visit unless you have it before your annual visit then you don't need it repeated  Follow-up:  See Dr Muro for cardiology follow up in Jan.  Try calling in next week    Reminder:  Please bring in your current medication list or your medication, over the counter supplements and vitamin bottles as we will review these at each office visit.                  Follow-ups after your visit        Who to contact     If you have questions or need follow up information about today's clinic visit or your schedule please contact [facility] directly at [phone].  Normal or non-critical lab and imaging results will be communicated to you by MyChart, letter or phone within 4 business days after the clinic has received the results. If you do not hear from us within 7 days, please contact the clinic through MyChart or phone. If you have a critical or abnormal lab result, we will notify you by phone as soon as possible.  Submit refill requests through [employer] or call your pharmacy and they will forward the refill request to us. Please allow 3 business days for your refill to be completed.          Additional Information About Your Visit       "  MyChart Information     Try [employer] gives you secure access to your electronic health record. If you see a primary care provider, you can also send messages to your care team and make appointments. If you have questions, please call your primary care clinic.  If you do not have a primary care provider, please call [phone] and they will assist you.        Care EveryWhere ID     This is your Care EveryWhere ID. This could be used by other organizations to access your [facility] medical records  [id number]        Your Vitals Were     Pulse Height BMI (Body Mass Index)             56 1.638 m (5' 4.5\") 31.26 kg/m2          Blood Pressure from Last 3 Encounters:   10/04/18 126/78   07/13/18 122/76   06/19/18 124/74    Weight from Last 3 Encounters:   10/04/18 83.9 kg (185 lb)   07/13/18 82.6 kg (182 lb)   06/19/18 83.5 kg (184 lb)              We Performed the Following     Follow-Up with Cardiac Advanced Practice Provider        Primary Care Provider Office Phone # Fax #    Kvng Smith [phone] [address] [place] [address]        Equal Access to Services     [facility]: Hadii aad ku hadasho Soyifanali, waaxda luqadaha, qaybta kaalmada adeegyada, kin miller. So [facility] [phone].    ATENCIÓN: Si habla español, tiene a fenton disposición servicios gratuitos de asistencia lingüística. [facility] [phone].    We comply with applicable federal civil rights laws and [place] laws. We do not discriminate on the basis of race, color, national origin, age, disability, sex, sexual orientation, or gender identity.            Thank you!     Thank you for choosing [facility]  for your care. Our goal is always to provide you with excellent care. Hearing back from our patients is one way we can continue to improve our services. Please take a few minutes to complete the written survey that you may receive in the mail " after your visit with us. Thank you!             Your Updated Medication List - Protect others around you: Learn how to safely use, store and throw away your medicines at www.disposemymeds.org.          This list is accurate as of 10/4/18 10:34 AM.  Always use your most recent med list.                   Brand Name Dispense Instructions for use Diagnosis    aspirin 81 MG tablet      Take 81 mg by mouth daily        clopidogrel 75 MG tablet    PLAVIX    30 tablet    Take 1 tablet (75 mg) by mouth daily    Coronary artery disease involving native coronary artery without angina pectoris       ezetimibe-simvastatin 10-20 MG per tablet    VYTORIN    90 tablet    Take 1 tablet by mouth At Bedtime    Mixed hyperlipidemia       lisinopril 5 MG tablet    PRINIVIL/ZESTRIL    90 tablet    TAKE ONE TABLET BY MOUTH ONE TIME DAILY    Coronary artery disease involving native coronary artery of native heart without angina pectoris       metFORMIN 500 MG tablet    GLUCOPHAGE    90 tablet    Take 1 tablet (500mg) by mouth daily with breakfast.    Impaired fasting glucose       metoprolol succinate 100 MG 24 hr tablet    TOPROL-XL    90 tablet    TAKE ONE TABLET BY MOUTH ONE TIME DAILY    Coronary artery disease involving native coronary artery of native heart without angina pectoris       nitroGLYcerin 0.4 MG sublingual tablet    NITROSTAT    25 tablet    Place 1 tablet (0.4 mg) under the tongue every 5 minutes as needed for chest pain May repeat X 2. If no relief after 3 tablets call 911    Coronary artery disease involving native coronary artery of native heart without angina pectoris       sildenafil 20 MG tablet    REVATIO    60 tablet    TAKE 2 TABLETS BY MOUTH DAILY AS NEEDED. NEVER USE WITH NITROGLYCERIN, TERAZOSIN OR DOXAZOSIN    Erectile dysfunction, unspecified erectile dysfunction type

## 2018-10-04 NOTE — LETTER
10/4/2018      Kvng Smith MD  [address] [place] [address]      RE: Guillermo LIONEL Noahricardoelizabeth       Dear Colleague,    I had the pleasure of seeing Guillermo Ford in the [facility].    Service Date: 10/04/2018      HISTORY OF PRESENT ILLNESS:  Guillermo is a pleasant 68-year-old gentleman who presents to the office today for a 3-month followup.      His cardiac history includes prior inferior wall myocardial infarction in 2005 at which time he underwent coronary angiography revealing a severe stenosis in the distal right coronary artery involving the PDA.  He underwent stent placement in the RCA along with angioplasty of the ostium of the PDA.  His EF was reduced to 35%-40% around that time.  Fortunately with revascularization, his EF normalized.  His last nuclear stress test in early 2015 showed a small area of infarction in the inferior wall without any vicenta-infarct ischemia.      Again, he returns to the Cardiology office today for routine followup.  He states he has been doing well.  He denies any chest discomfort or significant dyspnea on exertion.  No lower extremity edema, orthopnea or PND.  He does have sleep apnea and does wear his CPAP.      CURRENT CARDIAC MEDICATIONS:   1.  Aspirin 81 mg daily.   2.  Clopidogrel 75 mg daily.    3.  Zetia/simvastatin 10-20 one tablet nightly.    4.  Lisinopril 5 mg daily.   5.  Toprol- mg daily.   6.  Sublingual nitro p.r.n.      The remainder of his medications, allergies and review of systems were reviewed and as are documented separately.      PHYSICAL EXAMINATION:   GENERAL:  The patient is a pleasant 68-year-old gentleman who appears his stated age.  He is in no apparent distress.   VITAL SIGNS:  His blood pressure is 126/78, pulse is 56, weight is 185 pounds which is up a couple of pounds since his last office visit.   PULMONARY:  Breathing is nonlabored.  Lungs are clear to auscultation bilaterally.   CARDIAC:  Reveals a  regular rate and rhythm.  He does have an occasional ectopic beat.   NEUROLOGIC:  Alert and oriented.      He had fasting blood work today showing a sodium of 136, potassium of 4.2, BUN of 17 and creatinine of 0.92.      His last echocardiogram was in July of this year.  He had normal LV size and systolic function with an EF of 60%-65%.  No wall motion abnormalities were noted.  No valvular heart disease was noted.      ASSESSMENT AND PLAN:  Mr. Ford is a pleasant 68-year-old gentleman with a history of coronary artery disease with a prior inferior wall myocardial infarction in  at which time he underwent stent placement in the RCA and angioplasty of the ostium of the PDA.  He overall has been doing well since that time.  His blood pressure and his dyslipidemia appear fairly well controlled.  I did not make any changes to his medical therapy today, although at some point in the future we may consider discontinuing his clopidogrel.  On the other hand, he has been on it for more than a decade now without any complications.  He is aware that he should attempt weight loss, but states that he struggles to make the needed changes.      He will follow up in the Cardiology Clinic with Dr. Muro as previously recommended by Dr. Quezada.  We did discuss potentially pushing out his cardiology visit a bit further than 3 months from now, but at this time the patient would like to keep the 3-month followup, then possibly extend his visits following that.      Thank you for allowing me to participate in the care of this pleasant patient.         CITLALI TABOR PA-C             D: 10/04/2018   T: 10/04/2018   MT: SHAYAN      Name:     LOLA FORD   MRN:      [id number]        Account:      [id number]   :      1950           Service Date: 10/04/2018      Document: [id number]         Outpatient Encounter Prescriptions as of 10/4/2018   Medication Sig Dispense Refill     aspirin 81 MG tablet Take 81 mg by mouth  daily        clopidogrel (PLAVIX) 75 MG tablet Take 1 tablet (75 mg) by mouth daily 30 tablet 3     ezetimibe-simvastatin (VYTORIN) 10-20 MG per tablet Take 1 tablet by mouth At Bedtime 90 tablet 3     lisinopril (PRINIVIL/ZESTRIL) 5 MG tablet TAKE ONE TABLET BY MOUTH ONE TIME DAILY  90 tablet 3     metFORMIN (GLUCOPHAGE) 500 MG tablet Take 1 tablet (500mg) by mouth daily with breakfast. 90 tablet 3     metoprolol succinate (TOPROL-XL) 100 MG 24 hr tablet TAKE ONE TABLET BY MOUTH ONE TIME DAILY  90 tablet 3     nitroglycerin (NITROSTAT) 0.4 MG sublingual tablet Place 1 tablet (0.4 mg) under the tongue every 5 minutes as needed for chest pain May repeat X 2. If no relief after 3 tablets call 911 25 tablet 2     sildenafil (REVATIO) 20 MG tablet TAKE 2 TABLETS BY MOUTH DAILY AS NEEDED. NEVER USE WITH NITROGLYCERIN, TERAZOSIN OR DOXAZOSIN 60 tablet 10     No facility-administered encounter medications on file as of 10/4/2018.        Again, thank you for allowing me to participate in the care of your patient.      Sincerely,    Alexus Gagnon PA-C     [facility]

## 2018-10-04 NOTE — PROGRESS NOTES
"Please see separate dictation for HPI, PHYSICAL EXAM AND IMPRESSION/PLAN.    CURRENT MEDICATIONS:  Current Outpatient Prescriptions   Medication Sig Dispense Refill     aspirin 81 MG tablet Take 81 mg by mouth daily        clopidogrel (PLAVIX) 75 MG tablet Take 1 tablet (75 mg) by mouth daily 30 tablet 3     ezetimibe-simvastatin (VYTORIN) 10-20 MG per tablet Take 1 tablet by mouth At Bedtime 90 tablet 3     lisinopril (PRINIVIL/ZESTRIL) 5 MG tablet TAKE ONE TABLET BY MOUTH ONE TIME DAILY  90 tablet 3     metFORMIN (GLUCOPHAGE) 500 MG tablet Take 1 tablet (500mg) by mouth daily with breakfast. 90 tablet 3     metoprolol succinate (TOPROL-XL) 100 MG 24 hr tablet TAKE ONE TABLET BY MOUTH ONE TIME DAILY  90 tablet 3     nitroglycerin (NITROSTAT) 0.4 MG sublingual tablet Place 1 tablet (0.4 mg) under the tongue every 5 minutes as needed for chest pain May repeat X 2. If no relief after 3 tablets call 911 25 tablet 2     sildenafil (REVATIO) 20 MG tablet TAKE 2 TABLETS BY MOUTH DAILY AS NEEDED. NEVER USE WITH NITROGLYCERIN, TERAZOSIN OR DOXAZOSIN 60 tablet 10       ALLERGIES:   No Known Allergies    PAST MEDICAL HISTORY:  Past Medical History:   Diagnosis Date     Alcohol Dependence in Remission     sober since 1996     CAD (coronary artery disease)     cardiac cath 2005: stent to PDA     Diverticulitis of colon 6/9/2008    Diagnosed after diarrheal episode from colchicine; \"Probable\" on CT scan 6/08.     GERD (gastroesophageal reflux disease)      Gout      History of acute inferior wall MI     2005     Left ventricular diastolic dysfunction      Mixed hyperlipidemia      Sleep apnea     C PAP       PAST SURGICAL HISTORY:  Past Surgical History:   Procedure Laterality Date     HC CORONARY STENT PERCUT, INITIAL VESSEL  4/18/2005    PTCA with intracoronary stent placement of, distal RCA and ostial PDA     HC REPAIR OF NASAL SEPTUM      Septoplasty and uvulectomy       SOCIAL HISTORY:  Social History     Social History     " Marital status:      Spouse name: N/A     Number of children: N/A     Years of education: N/A     Social History Main Topics     Smoking status: Former Smoker     Quit date: 4/27/2000     Smokeless tobacco: Never Used      Comment: previous 2 ppd smoker     Alcohol use No     Drug use: No     Sexual activity: Yes     Partners: Female     Other Topics Concern     Parent/Sibling W/ Cabg, Mi Or Angioplasty Before 65f 55m? No     Caffeine Concern No     coffee 1/2 decaf- 4 cups a day     Sleep Concern No     Stress Concern No     Weight Concern No     Special Diet No     Exercise Yes     tredmill 1-2 days a week     Seat Belt Yes     Social History Narrative       FAMILY HISTORY:  Family History   Problem Relation Age of Onset     C.A.D. Mother      C.A.D. Father      Diabetes Brother      unsure of type 1/2     Family History Negative Sister      Family History Negative Son      Family History Negative Daughter      Family History Negative Brother      Cancer - colorectal No family hx of      Prostate Cancer No family hx of        Review of Systems:  Skin:  Negative       Eyes:  Negative      ENT:  Negative      Respiratory:  Positive for sleep apnea;CPAP     Cardiovascular:  Negative      Gastroenterology: Negative      Genitourinary:  Negative      Musculoskeletal:  Positive for arthritis    Neurologic:  Negative      Psychiatric:  Negative      Heme/Lymph/Imm:  Negative      Endocrine:  Negative         Reviewed. Remainder of the note dictated.    Alexus Gagnon PA-C

## 2018-11-27 DIAGNOSIS — I25.10 CORONARY ARTERY DISEASE INVOLVING NATIVE CORONARY ARTERY WITHOUT ANGINA PECTORIS: ICD-10-CM

## 2018-11-27 RX ORDER — CLOPIDOGREL BISULFATE 75 MG/1
75 TABLET ORAL DAILY
Qty: 90 TABLET | Refills: 3 | Status: SHIPPED | OUTPATIENT
Start: 2018-11-27 | End: 2019-01-24

## 2019-01-12 DIAGNOSIS — I25.10 CORONARY ARTERY DISEASE INVOLVING NATIVE CORONARY ARTERY OF NATIVE HEART WITHOUT ANGINA PECTORIS: ICD-10-CM

## 2019-01-12 DIAGNOSIS — R73.01 IMPAIRED FASTING GLUCOSE: ICD-10-CM

## 2019-01-16 RX ORDER — METOPROLOL SUCCINATE 100 MG/1
100 TABLET, EXTENDED RELEASE ORAL DAILY
Qty: 90 TABLET | Refills: 3 | Status: SHIPPED | OUTPATIENT
Start: 2019-01-16 | End: 2020-01-02

## 2019-01-16 RX ORDER — LISINOPRIL 5 MG/1
5 TABLET ORAL DAILY
Qty: 90 TABLET | Refills: 0 | Status: SHIPPED | OUTPATIENT
Start: 2019-01-16 | End: 2019-03-05

## 2019-01-16 NOTE — TELEPHONE ENCOUNTER
Routing refill request to provider for review/approval because:  Patient needs to be seen because it has been more than 1 year since last office visit.    Sujey Bishop RN

## 2019-01-17 NOTE — TELEPHONE ENCOUNTER
Called and spoke to patient. He is aware he is overdue and will call back to schedule.    Sandi James MA 10:52 AM 1/17/2019

## 2019-01-24 DIAGNOSIS — E78.2 MIXED HYPERLIPIDEMIA: ICD-10-CM

## 2019-01-24 DIAGNOSIS — I25.10 CORONARY ARTERY DISEASE INVOLVING NATIVE CORONARY ARTERY WITHOUT ANGINA PECTORIS: ICD-10-CM

## 2019-01-24 RX ORDER — EZETIMIBE AND SIMVASTATIN 10; 20 MG/1; MG/1
1 TABLET ORAL AT BEDTIME
Qty: 90 TABLET | Refills: 0 | Status: SHIPPED | OUTPATIENT
Start: 2019-01-24 | End: 2019-03-05

## 2019-01-24 RX ORDER — CLOPIDOGREL BISULFATE 75 MG/1
75 TABLET ORAL DAILY
Qty: 90 TABLET | Refills: 0 | Status: SHIPPED | OUTPATIENT
Start: 2019-01-24 | End: 2019-03-05

## 2019-01-24 NOTE — TELEPHONE ENCOUNTER
Received refill request for: Plavix and Zetia/Simvastin   Last OV was: 10/4/2018 with CLARISSE Mesa  Labs/EKG: last lipid 7/10/2018  F/U scheduled: 2/8/2019 with Dr. Muro  New script sent to: [facility]

## 2019-02-08 ENCOUNTER — OFFICE VISIT (OUTPATIENT)
Dept: CARDIOLOGY | Facility: CLINIC | Age: 69
End: 2019-02-08
Attending: INTERNAL MEDICINE
Payer: COMMERCIAL

## 2019-02-08 VITALS
BODY MASS INDEX: 29.99 KG/M2 | HEART RATE: 56 BPM | HEIGHT: 65 IN | DIASTOLIC BLOOD PRESSURE: 62 MMHG | OXYGEN SATURATION: 97 % | SYSTOLIC BLOOD PRESSURE: 100 MMHG | WEIGHT: 180 LBS

## 2019-02-08 DIAGNOSIS — R03.0 ELEVATED BLOOD PRESSURE READING WITHOUT DIAGNOSIS OF HYPERTENSION: ICD-10-CM

## 2019-02-08 DIAGNOSIS — E78.2 MIXED HYPERLIPIDEMIA: ICD-10-CM

## 2019-02-08 DIAGNOSIS — R73.01 IMPAIRED FASTING GLUCOSE: ICD-10-CM

## 2019-02-08 DIAGNOSIS — I25.10 CORONARY ARTERY DISEASE INVOLVING NATIVE CORONARY ARTERY OF NATIVE HEART WITHOUT ANGINA PECTORIS: ICD-10-CM

## 2019-02-08 LAB
ALT SERPL W P-5'-P-CCNC: 37 U/L (ref 0–70)
ANION GAP SERPL CALCULATED.3IONS-SCNC: 7 MMOL/L (ref 3–14)
BUN SERPL-MCNC: 16 MG/DL (ref 7–30)
CALCIUM SERPL-MCNC: 9 MG/DL (ref 8.5–10.1)
CHLORIDE SERPL-SCNC: 106 MMOL/L (ref 94–109)
CHOLEST SERPL-MCNC: 140 MG/DL
CO2 SERPL-SCNC: 25 MMOL/L (ref 20–32)
CREAT SERPL-MCNC: 0.97 MG/DL (ref 0.66–1.25)
GFR SERPL CREATININE-BSD FRML MDRD: 81 ML/MIN/{1.73_M2}
GLUCOSE SERPL-MCNC: 104 MG/DL (ref 70–99)
HDLC SERPL-MCNC: 57 MG/DL
LDLC SERPL CALC-MCNC: 52 MG/DL
NONHDLC SERPL-MCNC: 83 MG/DL
POTASSIUM SERPL-SCNC: 4.2 MMOL/L (ref 3.4–5.3)
SODIUM SERPL-SCNC: 138 MMOL/L (ref 133–144)
TRIGL SERPL-MCNC: 154 MG/DL

## 2019-02-08 PROCEDURE — 36415 COLL VENOUS BLD VENIPUNCTURE: CPT | Performed by: INTERNAL MEDICINE

## 2019-02-08 PROCEDURE — 80048 BASIC METABOLIC PNL TOTAL CA: CPT | Performed by: INTERNAL MEDICINE

## 2019-02-08 PROCEDURE — 80061 LIPID PANEL: CPT | Performed by: INTERNAL MEDICINE

## 2019-02-08 PROCEDURE — 99214 OFFICE O/P EST MOD 30 MIN: CPT | Performed by: INTERNAL MEDICINE

## 2019-02-08 PROCEDURE — 84460 ALANINE AMINO (ALT) (SGPT): CPT | Performed by: INTERNAL MEDICINE

## 2019-02-08 ASSESSMENT — MIFFLIN-ST. JEOR: SCORE: 1515.41

## 2019-02-08 NOTE — TELEPHONE ENCOUNTER
"Requested Prescriptions   Pending Prescriptions Disp Refills     metFORMIN (GLUCOPHAGE) 500 MG tablet  Last Written Prescription Date:  01/16/2019  Last Fill Quantity: 30 tablet,  # refills: 0   Last office visit: 8/18/2017 with prescribing provider:  Kvng Smith MD    Future Office Visit:     30 tablet 0     Sig: Take 1 tablet (500 mg) by mouth daily (with breakfast) Needs appointment with Dr. Smith before next refill.    Biguanide Agents Failed - 2/8/2019  9:08 AM       Failed - Patient has had a Microalbumin in the past 15 mos.    Recent Labs   Lab Test 08/02/16  1324   MICROL <5   UMALCR Unable to calculate due to low value            Failed - Patient has documented A1c within the specified period of time.    If HgbA1C is 8 or greater, it needs to be on file within the past 3 months.  If less than 8, must be on file within the past 6 months.     Recent Labs   Lab Test 08/18/17  1011   A1C 5.9            Failed - Recent (6 mo) or future (30 days) visit within the authorizing provider's specialty    Patient had office visit in the last 6 months or has a visit in the next 30 days with authorizing provider or within the authorizing provider's specialty.  See \"Patient Info\" tab in inbasket, or \"Choose Columns\" in Meds & Orders section of the refill encounter.           Passed - Blood pressure less than 140/90 in past 6 months    BP Readings from Last 3 Encounters:   02/08/19 100/62   10/04/18 126/78   07/13/18 122/76                Passed - Patient has documented LDL within the past 12 mos.    Recent Labs   Lab Test 02/08/19  0751   LDL 52            Passed - Patient is age 10 or older       Passed - Patient's CR is NOT>1.4 OR Patient's EGFR is NOT<45 within past 12 mos.    Recent Labs   Lab Test 02/08/19  0751   GFRESTIMATED 81   GFRESTBLACK >90       Recent Labs   Lab Test 02/08/19  0751   CR 0.97            Passed - Patient does NOT have a diagnosis of CHF.       Passed - Medication is active on med list    "

## 2019-02-08 NOTE — TELEPHONE ENCOUNTER
Routing refill request to provider for review/approval because:  Mary given x1 and patient did not follow up, please advise as patient has been advised over phone and has not followed up and no future appointment scheduled  Labs not current:  all  Patient needs to be seen because it has been more than 1 year since last office visit.    Diane JOHNSON RN - Triage  [facility]

## 2019-02-08 NOTE — LETTER
2/8/2019      Kvng Smith MD  [address] [place] [address]      RE: Guillermo Sainielizabeth       Dear Colleague,    I had the pleasure of seeing Guillermo Ford in the [facility].    Service Date: 02/08/2019      HISTORY OF PRESENT ILLNESS:  Mr. Ford is a pleasant 68-year-old gentleman with a history of coronary artery disease with a prior inferior wall myocardial infarction in 2005 at which time he underwent cardiac catheterization and was found to have a distal RCA stenosis involving the PDA.  He underwent drug-eluting stent implantation at that time.  With revascularization, his initial mild cardiomyopathy resolved.  He returns in routine 3-month followup to establish care in my office.      He saw Alexus Gagnon last on 10/04/2018.  He has essentially been asymptomatic from a cardiovascular standpoint since 2005.      The patient had blood work performed today showing an LDL of 52.  His BMP was unremarkable.  His blood pressure is well controlled in the office today at 100/62, and he denies any lightheadedness or dizziness.      IMPRESSION AND PLAN:   1.  Coronary artery disease -- Mr. Ford has been clinically asymptomatic since 2005.  He has been on dual antiplatelet therapy since 2005.  I do not see an indication for dual antiplatelet therapy at this time, and I have stopped his aspirin 81 mg a day in favor of lifelong clopidogrel therapy.  He will remain on his Zetia/simvastatin combination 10/20 unchanged for secondary prevention.  His most recent LDL was 52, showing excellent control.  His blood pressure is also well controlled.  I believe that he can follow back with Cardiology in 1 year.   2.  Essential hypertension -- the patient's blood pressure is currently well controlled, and I will continue his lisinopril and metoprolol unchanged.   3.  Dyslipidemia -- the patient's LDL is 52, and I will continue his Vytorin unchanged for secondary prevention.       Mr. Ford will return in annual followup.  He is instructed to call the clinic should he have any difficulties stopping his aspirin.  I think the likelihood of this is exceptionally low.         EMMANUELLE BLANK MD             D: 2019   T: 2019   MT: VY      Name:     LOLA FORD   MRN:      [id number]        Account:      [id number]   :      1950           Service Date: 2019      Document: [id number]         Outpatient Encounter Medications as of 2019   Medication Sig Dispense Refill     clopidogrel (PLAVIX) 75 MG tablet Take 1 tablet (75 mg) by mouth daily 90 tablet 0     ezetimibe-simvastatin (VYTORIN) 10-20 MG tablet Take 1 tablet by mouth At Bedtime 90 tablet 0     lisinopril (PRINIVIL/ZESTRIL) 5 MG tablet Take 1 tablet (5 mg) by mouth daily Needs appointment with Dr. Smith before next refill. 90 tablet 0     metFORMIN (GLUCOPHAGE) 500 MG tablet Take 1 tablet (500 mg) by mouth daily (with breakfast) Needs appointment with Dr. Smith before next refill. 30 tablet 0     metoprolol succinate ER (TOPROL-XL) 100 MG 24 hr tablet Take 1 tablet (100 mg) by mouth daily 90 tablet 3     nitroglycerin (NITROSTAT) 0.4 MG sublingual tablet Place 1 tablet (0.4 mg) under the tongue every 5 minutes as needed for chest pain May repeat X 2. If no relief after 3 tablets call 911 25 tablet 2     sildenafil (REVATIO) 20 MG tablet TAKE 2 TABLETS BY MOUTH DAILY AS NEEDED. NEVER USE WITH NITROGLYCERIN, TERAZOSIN OR DOXAZOSIN 60 tablet 10     [DISCONTINUED] aspirin 81 MG tablet Take 81 mg by mouth daily        No facility-administered encounter medications on file as of 2019.        Again, thank you for allowing me to participate in the care of your patient.      Sincerely,    Emmanuelle Blank MD     [facility]

## 2019-02-08 NOTE — PROGRESS NOTES
Service Date: 02/08/2019      HISTORY OF PRESENT ILLNESS:  Mr. Ford is a pleasant 68-year-old gentleman with a history of coronary artery disease with a prior inferior wall myocardial infarction in 2005 at which time he underwent cardiac catheterization and was found to have a distal RCA stenosis involving the PDA.  He underwent drug-eluting stent implantation at that time.  With revascularization, his initial mild cardiomyopathy resolved.  He returns in routine 3-month followup to establish care in my office.      He saw Alexus Gagnon last on 10/04/2018.  He has essentially been asymptomatic from a cardiovascular standpoint since 2005.      The patient had blood work performed today showing an LDL of 52.  His BMP was unremarkable.  His blood pressure is well controlled in the office today at 100/62, and he denies any lightheadedness or dizziness.      IMPRESSION AND PLAN:   1.  Coronary artery disease -- Mr. Ford has been clinically asymptomatic since 2005.  He has been on dual antiplatelet therapy since 2005.  I do not see an indication for dual antiplatelet therapy at this time, and I have stopped his aspirin 81 mg a day in favor of lifelong clopidogrel therapy.  He will remain on his Zetia/simvastatin combination 10/20 unchanged for secondary prevention.  His most recent LDL was 52, showing excellent control.  His blood pressure is also well controlled.  I believe that he can follow back with Cardiology in 1 year.   2.  Essential hypertension -- the patient's blood pressure is currently well controlled, and I will continue his lisinopril and metoprolol unchanged.   3.  Dyslipidemia -- the patient's LDL is 52, and I will continue his Vytorin unchanged for secondary prevention.      Mr. Ford will return in annual followup.  He is instructed to call the clinic should he have any difficulties stopping his aspirin.  I think the likelihood of this is exceptionally low.         EMMANUELLE BLANK MD              D: 2019   T: 2019   MT: VY      Name:     LOLA GRIFFIN   MRN:      [id number]        Account:      [id number]   :      1950           Service Date: 2019      Document: [id number]

## 2019-02-08 NOTE — PROGRESS NOTES
"HPI and Plan:   See dictation    Orders Placed This Encounter   Procedures     Follow-Up with Cardiologist       No orders of the defined types were placed in this encounter.      Medications Discontinued During This Encounter   Medication Reason     aspirin 81 MG tablet          Encounter Diagnoses   Name Primary?     Coronary artery disease involving native coronary artery of native heart without angina pectoris      Mixed hyperlipidemia      Elevated blood pressure reading without diagnosis of hypertension        CURRENT MEDICATIONS:  Current Outpatient Medications   Medication Sig Dispense Refill     clopidogrel (PLAVIX) 75 MG tablet Take 1 tablet (75 mg) by mouth daily 90 tablet 0     ezetimibe-simvastatin (VYTORIN) 10-20 MG tablet Take 1 tablet by mouth At Bedtime 90 tablet 0     lisinopril (PRINIVIL/ZESTRIL) 5 MG tablet Take 1 tablet (5 mg) by mouth daily Needs appointment with Dr. Smith before next refill. 90 tablet 0     metFORMIN (GLUCOPHAGE) 500 MG tablet Take 1 tablet (500 mg) by mouth daily (with breakfast) Needs appointment with Dr. Smith before next refill. 30 tablet 0     metoprolol succinate ER (TOPROL-XL) 100 MG 24 hr tablet Take 1 tablet (100 mg) by mouth daily 90 tablet 3     nitroglycerin (NITROSTAT) 0.4 MG sublingual tablet Place 1 tablet (0.4 mg) under the tongue every 5 minutes as needed for chest pain May repeat X 2. If no relief after 3 tablets call 911 25 tablet 2     sildenafil (REVATIO) 20 MG tablet TAKE 2 TABLETS BY MOUTH DAILY AS NEEDED. NEVER USE WITH NITROGLYCERIN, TERAZOSIN OR DOXAZOSIN 60 tablet 10       ALLERGIES   No Known Allergies    PAST MEDICAL HISTORY:  Past Medical History:   Diagnosis Date     Alcohol Dependence in Remission     sober since 1996     CAD (coronary artery disease)     cardiac cath 2005: stent to PDA     Diverticulitis of colon 6/9/2008    Diagnosed after diarrheal episode from colchicine; \"Probable\" on CT scan 6/08.     GERD (gastroesophageal reflux disease)  "     Gout      History of acute inferior wall MI          Left ventricular diastolic dysfunction      Mixed hyperlipidemia      Sleep apnea     C PAP       PAST SURGICAL HISTORY:  Past Surgical History:   Procedure Laterality Date     HC CORONARY STENT PERCUT, INITIAL VESSEL  2005    PTCA with intracoronary stent placement of, distal RCA and ostial PDA     HC REPAIR OF NASAL SEPTUM      Septoplasty and uvulectomy       FAMILY HISTORY:  Family History   Problem Relation Age of Onset     C.A.D. Mother      C.A.D. Father      Diabetes Brother         unsure of type 1/2     Family History Negative Sister      Family History Negative Son      Family History Negative Daughter      Family History Negative Brother      Cancer - colorectal No family hx of      Prostate Cancer No family hx of        SOCIAL HISTORY:  Social History     Socioeconomic History     Marital status:      Spouse name: None     Number of children: None     Years of education: None     Highest education level: None   Social Needs     Financial resource strain: None     Food insecurity - worry: None     Food insecurity - inability: None     Transportation needs - medical: None     Transportation needs - non-medical: None   Occupational History     None   Tobacco Use     Smoking status: Former Smoker     Last attempt to quit: 2000     Years since quittin.7     Smokeless tobacco: Never Used     Tobacco comment: previous 2 ppd smoker   Substance and Sexual Activity     Alcohol use: No     Alcohol/week: 0.0 oz     Drug use: No     Sexual activity: Yes     Partners: Female   Other Topics Concern     Parent/sibling w/ CABG, MI or angioplasty before 65F 55M? No      Service Not Asked     Blood Transfusions Not Asked     Caffeine Concern No     Comment: coffee 1/2 decaf- 4 cups a day     Occupational Exposure Not Asked     Hobby Hazards Not Asked     Sleep Concern No     Stress Concern No     Weight Concern No     Special Diet  "No     Back Care Not Asked     Exercise Yes     Comment: tredmill 1-2 days a week     Bike Helmet Not Asked     Seat Belt Yes     Self-Exams Not Asked   Social History Narrative     None       Review of Systems:  Skin:  Negative       Eyes:  Positive for glasses reading glasses   ENT:  Negative      Respiratory:  Positive for sleep apnea;CPAP     Cardiovascular:  Negative      Gastroenterology: Negative      Genitourinary:  Negative      Musculoskeletal:  Positive for arthritis    Neurologic:  Negative      Psychiatric:  Negative      Heme/Lymph/Imm:  Positive for easy bruising    Endocrine:  Positive for diabetes      Physical Exam:  Vitals: /62 (BP Location: Right arm, Patient Position: Sitting, Cuff Size: Adult Regular)   Pulse 56   Ht 1.638 m (5' 4.5\")   Wt 81.6 kg (180 lb)   SpO2 97%   BMI 30.42 kg/m      Constitutional:  cooperative overweight      Skin:  warm and dry to the touch          Head:  normocephalic        Eyes:  sclera white        Lymph:No Cervical lymphadenopathy present     ENT:  no pallor or cyanosis        Neck:  JVP normal        Respiratory:  clear to auscultation         Cardiac: regular rhythm;normal S1 and S2   S4   systolic murmur;LUSB;grade 1        pulses full and equal                                        GI:           Extremities and Muscular Skeletal:  no deformities, clubbing, cyanosis, erythema observed;no edema              Neurological:  no gross motor deficits        Psych:  Alert and Oriented x 3;affect appropriate, oriented to time, person and place        CC  Hemal Quezada MD  [id number]  [place] [id number]              "

## 2019-02-08 NOTE — LETTER
2/8/2019    Kvng Smith MD  [address] [place] [address]    RE: Guillermo Ford       Dear Colleague,    I had the pleasure of seeing Guillermo Ford in the [facility].    HPI and Plan:   See dictation    Orders Placed This Encounter   Procedures     Follow-Up with Cardiologist       No orders of the defined types were placed in this encounter.      Medications Discontinued During This Encounter   Medication Reason     aspirin 81 MG tablet          Encounter Diagnoses   Name Primary?     Coronary artery disease involving native coronary artery of native heart without angina pectoris      Mixed hyperlipidemia      Elevated blood pressure reading without diagnosis of hypertension        CURRENT MEDICATIONS:  Current Outpatient Medications   Medication Sig Dispense Refill     clopidogrel (PLAVIX) 75 MG tablet Take 1 tablet (75 mg) by mouth daily 90 tablet 0     ezetimibe-simvastatin (VYTORIN) 10-20 MG tablet Take 1 tablet by mouth At Bedtime 90 tablet 0     lisinopril (PRINIVIL/ZESTRIL) 5 MG tablet Take 1 tablet (5 mg) by mouth daily Needs appointment with Dr. Smith before next refill. 90 tablet 0     metFORMIN (GLUCOPHAGE) 500 MG tablet Take 1 tablet (500 mg) by mouth daily (with breakfast) Needs appointment with Dr. Smith before next refill. 30 tablet 0     metoprolol succinate ER (TOPROL-XL) 100 MG 24 hr tablet Take 1 tablet (100 mg) by mouth daily 90 tablet 3     nitroglycerin (NITROSTAT) 0.4 MG sublingual tablet Place 1 tablet (0.4 mg) under the tongue every 5 minutes as needed for chest pain May repeat X 2. If no relief after 3 tablets call 911 25 tablet 2     sildenafil (REVATIO) 20 MG tablet TAKE 2 TABLETS BY MOUTH DAILY AS NEEDED. NEVER USE WITH NITROGLYCERIN, TERAZOSIN OR DOXAZOSIN 60 tablet 10       ALLERGIES   No Known Allergies    PAST MEDICAL HISTORY:  Past Medical History:   Diagnosis Date     Alcohol Dependence in Remission     sober since 1996     CAD  "(coronary artery disease)     cardiac cath : stent to PDA     Diverticulitis of colon 2008    Diagnosed after diarrheal episode from colchicine; \"Probable\" on CT scan .     GERD (gastroesophageal reflux disease)      Gout      History of acute inferior wall MI          Left ventricular diastolic dysfunction      Mixed hyperlipidemia      Sleep apnea     C PAP       PAST SURGICAL HISTORY:  Past Surgical History:   Procedure Laterality Date     HC CORONARY STENT PERCUT, INITIAL VESSEL  2005    PTCA with intracoronary stent placement of, distal RCA and ostial PDA     HC REPAIR OF NASAL SEPTUM      Septoplasty and uvulectomy       FAMILY HISTORY:  Family History   Problem Relation Age of Onset     C.A.D. Mother      C.A.D. Father      Diabetes Brother         unsure of type 1/2     Family History Negative Sister      Family History Negative Son      Family History Negative Daughter      Family History Negative Brother      Cancer - colorectal No family hx of      Prostate Cancer No family hx of        SOCIAL HISTORY:  Social History     Socioeconomic History     Marital status:      Spouse name: None     Number of children: None     Years of education: None     Highest education level: None   Social Needs     Financial resource strain: None     Food insecurity - worry: None     Food insecurity - inability: None     Transportation needs - medical: None     Transportation needs - non-medical: None   Occupational History     None   Tobacco Use     Smoking status: Former Smoker     Last attempt to quit: 2000     Years since quittin.7     Smokeless tobacco: Never Used     Tobacco comment: previous 2 ppd smoker   Substance and Sexual Activity     Alcohol use: No     Alcohol/week: 0.0 oz     Drug use: No     Sexual activity: Yes     Partners: Female   Other Topics Concern     Parent/sibling w/ CABG, MI or angioplasty before 65F 55M? No      Service Not Asked     Blood Transfusions " "Not Asked     Caffeine Concern No     Comment: coffee 1/2 decaf- 4 cups a day     Occupational Exposure Not Asked     Hobby Hazards Not Asked     Sleep Concern No     Stress Concern No     Weight Concern No     Special Diet No     Back Care Not Asked     Exercise Yes     Comment: tredmill 1-2 days a week     Bike Helmet Not Asked     Seat Belt Yes     Self-Exams Not Asked   Social History Narrative     None       Review of Systems:  Skin:  Negative       Eyes:  Positive for glasses reading glasses   ENT:  Negative      Respiratory:  Positive for sleep apnea;CPAP     Cardiovascular:  Negative      Gastroenterology: Negative      Genitourinary:  Negative      Musculoskeletal:  Positive for arthritis    Neurologic:  Negative      Psychiatric:  Negative      Heme/Lymph/Imm:  Positive for easy bruising    Endocrine:  Positive for diabetes      Physical Exam:  Vitals: /62 (BP Location: Right arm, Patient Position: Sitting, Cuff Size: Adult Regular)   Pulse 56   Ht 1.638 m (5' 4.5\")   Wt 81.6 kg (180 lb)   SpO2 97%   BMI 30.42 kg/m       Constitutional:  cooperative overweight      Skin:  warm and dry to the touch          Head:  normocephalic        Eyes:  sclera white        Lymph:No Cervical lymphadenopathy present     ENT:  no pallor or cyanosis        Neck:  JVP normal        Respiratory:  clear to auscultation         Cardiac: regular rhythm;normal S1 and S2   S4   systolic murmur;LUSB;grade 1        pulses full and equal                                        GI:           Extremities and Muscular Skeletal:  no deformities, clubbing, cyanosis, erythema observed;no edema              Neurological:  no gross motor deficits        Psych:  Alert and Oriented x 3;affect appropriate, oriented to time, person and place        CC  Hemal Quezada MD  [id number]  [place] [id number]                Thank you for allowing me to participate in the care of your patient.      Sincerely,     Ivan" MD Bhaskar     [facility]    cc:   Hemal Quezada MD  [id number]  [place] [id number]

## 2019-02-26 DIAGNOSIS — R73.01 IMPAIRED FASTING GLUCOSE: ICD-10-CM

## 2019-02-26 NOTE — TELEPHONE ENCOUNTER
Requested Prescriptions   Pending Prescriptions Disp Refills     metFORMIN (GLUCOPHAGE) 500 MG tablet    Last Written Prescription Date:  1/16/2019  Last Fill Quantity: 30,  # refills: 0   Last office visit: 8/18/2017 with prescribing provider:  Kvng Smith     Future Office Visit:   Next 5 appointments (look out 90 days)    Mar 05, 2019  8:00 AM CST  PHYSICAL with [facility]ng [facility] [address] [place] [id number] [phone]          30 tablet 0     Sig: Take 1 tablet (500 mg) by mouth daily (with breakfast) Needs appointment with Dr. Smith before next refill.    Biguanide Agents Failed - 2/26/2019  8:06 AM       Failed - Patient has had a Microalbumin in the past 15 mos.    Recent Labs   Lab Test 08/02/16  1324   MICROL <5   UMALCR Unable to calculate due to low value            Failed - Patient has documented A1c within the specified period of time.    If HgbA1C is 8 or greater, it needs to be on file within the past 3 months.  If less than 8, must be on file within the past 6 months.     Recent Labs   Lab Test 08/18/17  1011   A1C 5.9            Passed - Blood pressure less than 140/90 in past 6 months    BP Readings from Last 3 Encounters:   02/08/19 100/62   10/04/18 126/78   07/13/18 122/76                Passed - Patient has documented LDL within the past 12 mos.    Recent Labs   Lab Test 02/08/19  0751   LDL 52            Passed - Patient is age 10 or older       Passed - Patient's CR is NOT>1.4 OR Patient's EGFR is NOT<45 within past 12 mos.    Recent Labs   Lab Test 02/08/19  0751   GFRESTIMATED 81   GFRESTBLACK >90       Recent Labs   Lab Test 02/08/19  0751   CR 0.97            Passed - Patient does NOT have a diagnosis of CHF.       Passed - Medication is active on med list       Passed - Recent (6 mo) or future (30 days) visit within the authorizing provider's specialty    Patient had office visit in the last 6 months  "or has a visit in the next 30 days with authorizing provider or within the authorizing provider's specialty.  See \"Patient Info\" tab in inbasket, or \"Choose Columns\" in Meds & Orders section of the refill encounter.              "

## 2019-02-28 NOTE — TELEPHONE ENCOUNTER
One more month approved, patient has appointment scheduled with PCP for next month.    Jacinda Her RN

## 2019-03-04 ASSESSMENT — ENCOUNTER SYMPTOMS
SORE THROAT: 0
DYSURIA: 0
DIARRHEA: 0
PARESTHESIAS: 0
DIZZINESS: 0
FREQUENCY: 0
NAUSEA: 0
HEMATURIA: 0
HEADACHES: 0
JOINT SWELLING: 0
EYE PAIN: 0
NERVOUS/ANXIOUS: 0
PALPITATIONS: 0
SHORTNESS OF BREATH: 0
CHILLS: 0
ARTHRALGIAS: 0
COUGH: 0
HEARTBURN: 0
WEAKNESS: 0
FEVER: 0
MYALGIAS: 0
HEMATOCHEZIA: 0
CONSTIPATION: 0
ABDOMINAL PAIN: 0

## 2019-03-04 ASSESSMENT — ACTIVITIES OF DAILY LIVING (ADL): CURRENT_FUNCTION: NO ASSISTANCE NEEDED

## 2019-03-05 ENCOUNTER — OFFICE VISIT (OUTPATIENT)
Dept: PEDIATRICS | Facility: CLINIC | Age: 69
End: 2019-03-05
Payer: COMMERCIAL

## 2019-03-05 VITALS
HEART RATE: 54 BPM | HEIGHT: 65 IN | TEMPERATURE: 98.2 F | SYSTOLIC BLOOD PRESSURE: 96 MMHG | OXYGEN SATURATION: 95 % | DIASTOLIC BLOOD PRESSURE: 60 MMHG | BODY MASS INDEX: 29.85 KG/M2 | WEIGHT: 179.2 LBS

## 2019-03-05 DIAGNOSIS — R73.01 IMPAIRED FASTING GLUCOSE: ICD-10-CM

## 2019-03-05 DIAGNOSIS — I25.10 CORONARY ARTERY DISEASE INVOLVING NATIVE CORONARY ARTERY OF NATIVE HEART WITHOUT ANGINA PECTORIS: ICD-10-CM

## 2019-03-05 DIAGNOSIS — Z12.11 SPECIAL SCREENING FOR MALIGNANT NEOPLASMS, COLON: ICD-10-CM

## 2019-03-05 DIAGNOSIS — H90.8 MIXED CONDUCTIVE AND SENSORINEURAL HEARING LOSS, UNSPECIFIED LATERALITY: ICD-10-CM

## 2019-03-05 DIAGNOSIS — E78.2 MIXED HYPERLIPIDEMIA: ICD-10-CM

## 2019-03-05 DIAGNOSIS — Z23 NEED FOR PROPHYLACTIC VACCINATION AND INOCULATION AGAINST INFLUENZA: ICD-10-CM

## 2019-03-05 DIAGNOSIS — Z00.00 ENCOUNTER FOR ROUTINE ADULT HEALTH EXAMINATION WITHOUT ABNORMAL FINDINGS: Primary | ICD-10-CM

## 2019-03-05 DIAGNOSIS — Z23 NEED FOR PROPHYLACTIC VACCINATION AGAINST STREPTOCOCCUS PNEUMONIAE (PNEUMOCOCCUS): ICD-10-CM

## 2019-03-05 LAB
CREAT UR-MCNC: 245 MG/DL
HBA1C MFR BLD: 6 % (ref 0–5.6)
MICROALBUMIN UR-MCNC: 16 MG/L
MICROALBUMIN/CREAT UR: 6.37 MG/G CR (ref 0–17)

## 2019-03-05 PROCEDURE — 99397 PER PM REEVAL EST PAT 65+ YR: CPT | Mod: 25 | Performed by: INTERNAL MEDICINE

## 2019-03-05 PROCEDURE — 90472 IMMUNIZATION ADMIN EACH ADD: CPT | Performed by: INTERNAL MEDICINE

## 2019-03-05 PROCEDURE — 83036 HEMOGLOBIN GLYCOSYLATED A1C: CPT | Performed by: INTERNAL MEDICINE

## 2019-03-05 PROCEDURE — 36415 COLL VENOUS BLD VENIPUNCTURE: CPT | Performed by: INTERNAL MEDICINE

## 2019-03-05 PROCEDURE — 82043 UR ALBUMIN QUANTITATIVE: CPT | Performed by: INTERNAL MEDICINE

## 2019-03-05 PROCEDURE — 90732 PPSV23 VACC 2 YRS+ SUBQ/IM: CPT | Performed by: INTERNAL MEDICINE

## 2019-03-05 PROCEDURE — 90471 IMMUNIZATION ADMIN: CPT | Performed by: INTERNAL MEDICINE

## 2019-03-05 PROCEDURE — 90715 TDAP VACCINE 7 YRS/> IM: CPT | Performed by: INTERNAL MEDICINE

## 2019-03-05 RX ORDER — CLOPIDOGREL BISULFATE 75 MG/1
75 TABLET ORAL DAILY
Qty: 90 TABLET | Refills: 3 | Status: SHIPPED | OUTPATIENT
Start: 2019-03-05 | End: 2020-01-27

## 2019-03-05 RX ORDER — LISINOPRIL 5 MG/1
5 TABLET ORAL DAILY
Qty: 90 TABLET | Refills: 0 | Status: SHIPPED | OUTPATIENT
Start: 2019-03-05 | End: 2019-07-02

## 2019-03-05 RX ORDER — EZETIMIBE AND SIMVASTATIN 10; 20 MG/1; MG/1
1 TABLET ORAL AT BEDTIME
Qty: 90 TABLET | Refills: 0 | Status: SHIPPED | OUTPATIENT
Start: 2019-03-05 | End: 2019-07-02

## 2019-03-05 ASSESSMENT — ENCOUNTER SYMPTOMS
DIARRHEA: 0
HEMATURIA: 0
CONSTIPATION: 0
PARESTHESIAS: 0
DIZZINESS: 0
EYE PAIN: 0
ARTHRALGIAS: 0
WEAKNESS: 0
MYALGIAS: 0
JOINT SWELLING: 0
FREQUENCY: 0
DYSURIA: 0
HEARTBURN: 0
FEVER: 0
COUGH: 0
PALPITATIONS: 0
NERVOUS/ANXIOUS: 0
NAUSEA: 0
HEMATOCHEZIA: 0
CHILLS: 0
SHORTNESS OF BREATH: 0
ABDOMINAL PAIN: 0
HEADACHES: 0
SORE THROAT: 0

## 2019-03-05 ASSESSMENT — ACTIVITIES OF DAILY LIVING (ADL): CURRENT_FUNCTION: NO ASSISTANCE NEEDED

## 2019-03-05 ASSESSMENT — MIFFLIN-ST. JEOR: SCORE: 1511.79

## 2019-03-05 NOTE — PATIENT INSTRUCTIONS
"Lab work downstairs today.  Directions:  As you walk through the first floor, you'll see (on the right) first the pharmacy, then some bathrooms, then the \"Lab and Imaging\" area. Give them your name at the window there and wait for them to call you.    Colonoscopy: you can call [phone] to set up your colonoscopy.  If they have not heard from you, they may call you directly.    2 shots today.     Call otolaryngologist for a hearing evaluation.    Kvng Smith MD  Internal Medicine and Pediatrics       Preventive Health Recommendations:     See your health care provider every year to    Review health changes.     Discuss preventive care.      Review your medicines if your doctor has prescribed any.    Talk with your health care provider about whether you should have a test to screen for prostate cancer (PSA).    Every 3 years, have a diabetes test (fasting glucose). If you are at risk for diabetes, you should have this test more often.    Every 5 years, have a cholesterol test. Have this test more often if you are at risk for high cholesterol or heart disease.     Every 10 years, have a colonoscopy. Or, have a yearly FIT test (stool test). These exams will check for colon cancer.    Talk to with your health care provider about screening for Abdominal Aortic Aneurysm if you have a family history of AAA or have a history of smoking.  Shots:     Get a flu shot each year.     Get a tetanus shot every 10 years.     Talk to your doctor about your pneumonia vaccines. There are now two you should receive - Pneumovax (PPSV 23) and Prevnar (PCV 13).    Talk to your pharmacist about a shingles vaccine.     Talk to your doctor about the hepatitis B vaccine.  Nutrition:     Eat at least 5 servings of fruits and vegetables each day.     Eat whole-grain bread, whole-wheat pasta and brown rice instead of white grains and rice.     Get adequate Calcium and Vitamin D.   Lifestyle    Exercise for at least 150 minutes a week (30 " minutes a day, 5 days a week). This will help you control your weight and prevent disease.     Limit alcohol to one drink per day.     No smoking.     Wear sunscreen to prevent skin cancer.     See your dentist every six months for an exam and cleaning.     See your eye doctor every 1 to 2 years to screen for conditions such as glaucoma, macular degeneration and cataracts.    Personalized Prevention Plan  You are due for the preventive services outlined below.  Your care team is available to assist you in scheduling these services.  If you have already completed any of these items, please share that information with your care team to update in your medical record.    Health Maintenance Due   Topic Date Due     AORTIC ANEURYSM SCREENING (SYSTEM ASSIGNED)  08/07/2017     Annual Wellness Visit  08/18/2018     FALL RISK ASSESSMENT  08/18/2018     Pneumococcal Vaccine (2 of 2 - PPSV23) 08/18/2018     Flu Vaccine (1) 09/01/2018     Zoster (Shingles) Vaccine (3 of 3) 11/16/2018

## 2019-03-05 NOTE — PROGRESS NOTES
"SUBJECTIVE:   Guillermo Ford is a 68 year old male who presents for Preventive Visit.      Are you in the first 12 months of your Medicare coverage?  No    Annual Wellness Visit     In general, how would you rate your overall health?  Excellent    Frequency of exercise:  None    Do you usually eat at least 4 servings of fruit and vegetables a day, include whole grains    & fiber and avoid regularly eating high fat or \"junk\" foods?  No    Taking medications regularly:  Yes    Medication side effects:  None    Ability to successfully perform activities of daily living:  No assistance needed    Home Safety:  No safety concerns identified    Hearing Impairment:  No hearing concerns    In the past 6 months, have you been bothered by leaking of urine?  No    In general, how would you rate your overall mental or emotional health?  Excellent    PHQ-2 Total Score: 0    Additional concerns today:  No    Do you feel safe in your environment? Yes    Do you have a Health Care Directive? No: Advance care planning was reviewed with patient; patient declined at this time.      Fall risk  Fallen 2 or more times in the past year?: No  Any fall with injury in the past year?: No    Cognitive Screening   1) Repeat 3 items (Leader, Season, Table)    2) Clock draw: NORMAL  3) 3 item recall: Recalls 3 objects  Results: NORMAL clock, 1-2 items recalled: COGNITIVE IMPAIRMENT LESS LIKELY    Mini-CogTM Copyright S Salty. Licensed by the author for use in [facility]; reprinted with permission (felicia@.[facility]). All rights reserved.      Do you have sleep apnea, excessive snoring or daytime drowsiness?: yes    Reviewed and updated as needed this visit by clinical staff  Tobacco  Allergies  Meds         Reviewed and updated as needed this visit by Provider        Social History     Tobacco Use     Smoking status: Former Smoker     Last attempt to quit: 2000     Years since quittin.8     Smokeless tobacco: Never Used     " Tobacco comment: previous 2 ppd smoker   Substance Use Topics     Alcohol use: No     Alcohol/week: 0.0 oz       Alcohol Use 3/4/2019   If you drink alcohol do you typically have greater than 3 drinks per day OR greater than 7 drinks per week? Not Applicable               Current providers sharing in care for this patient include:   Patient Care Team:  Kvng Smith MD as PCP - General (Internal Medicine)  Kvng Smith MD as PCP - Assigned PCP    The following health maintenance items are reviewed in Epic and correct as of today:  Health Maintenance   Topic Date Due     AORTIC ANEURYSM SCREENING (SYSTEM ASSIGNED)  08/07/2017     MEDICARE ANNUAL WELLNESS VISIT  08/18/2018     FALL RISK ASSESSMENT  08/18/2018     PNEUMOCOCCAL IMMUNIZATION 65+ LOW/MEDIUM RISK (2 of 2 - PPSV23) 08/18/2018     INFLUENZA VACCINE (1) 09/01/2018     ZOSTER IMMUNIZATION (3 of 3) 11/16/2018     DTAP/TDAP/TD IMMUNIZATION (2 - Td) 07/15/2019     COLON CANCER SCREEN (SYSTEM ASSIGNED)  10/15/2019     LIPID MONITORING Q1 YEAR  02/08/2020     PHQ-2 Q1 YR  03/05/2020     ADVANCE DIRECTIVE PLANNING Q5 YRS  06/28/2021     HEPATITIS C SCREENING  Completed     IPV IMMUNIZATION  Aged Out     MENINGITIS IMMUNIZATION  Aged Out     Labs reviewed in EPIC  BP Readings from Last 3 Encounters:   03/05/19 96/60   02/08/19 100/62   10/04/18 126/78    Wt Readings from Last 3 Encounters:   03/05/19 81.3 kg (179 lb 3.2 oz)   02/08/19 81.6 kg (180 lb)   10/04/18 83.9 kg (185 lb)                  Patient Active Problem List   Diagnosis     Lumbago     Gout     Diverticulitis of colon     Lumbar spinal stenosis     Coronary artery disease involving native coronary artery of native heart without angina pectoris     Mixed hyperlipidemia     Sleep apnea     GERD (gastroesophageal reflux disease)     Impaired fasting glucose     History of acute inferior wall MI     Left ventricular diastolic dysfunction     ACP (advance care planning)     Benign essential hypertension      Past Surgical History:   Procedure Laterality Date     HC CORONARY STENT PERCUT, INITIAL VESSEL  2005    PTCA with intracoronary stent placement of, distal RCA and ostial PDA     HC REPAIR OF NASAL SEPTUM      Septoplasty and uvulectomy       Social History     Tobacco Use     Smoking status: Former Smoker     Last attempt to quit: 2000     Years since quittin.8     Smokeless tobacco: Never Used     Tobacco comment: previous 2 ppd smoker   Substance Use Topics     Alcohol use: No     Alcohol/week: 0.0 oz     Family History   Problem Relation Age of Onset     C.A.D. Mother      C.A.D. Father      Diabetes Brother         unsure of type 1/2     Family History Negative Sister      Family History Negative Son      Family History Negative Daughter      Family History Negative Brother      Cancer - colorectal No family hx of      Prostate Cancer No family hx of          Current Outpatient Medications   Medication Sig Dispense Refill     ASPIRIN NOT PRESCRIBED (INTENTIONAL) Please choose reason not prescribed, below       clopidogrel (PLAVIX) 75 MG tablet Take 1 tablet (75 mg) by mouth daily 90 tablet 3     ezetimibe-simvastatin (VYTORIN) 10-20 MG tablet Take 1 tablet by mouth At Bedtime 90 tablet 0     lisinopril (PRINIVIL/ZESTRIL) 5 MG tablet Take 1 tablet (5 mg) by mouth daily 90 tablet 0     metFORMIN (GLUCOPHAGE) 500 MG tablet Take 1 tablet (500 mg) by mouth daily (with breakfast) 90 tablet 3     metoprolol succinate ER (TOPROL-XL) 100 MG 24 hr tablet Take 1 tablet (100 mg) by mouth daily 90 tablet 3     nitroglycerin (NITROSTAT) 0.4 MG sublingual tablet Place 1 tablet (0.4 mg) under the tongue every 5 minutes as needed for chest pain May repeat X 2. If no relief after 3 tablets call 911 25 tablet 2     sildenafil (REVATIO) 20 MG tablet TAKE 2 TABLETS BY MOUTH DAILY AS NEEDED. NEVER USE WITH NITROGLYCERIN, TERAZOSIN OR DOXAZOSIN 60 tablet 10     No Known Allergies  Pneumonia Vaccine:Adults age 65+  "who received Pneumovax (PPSV23) at 65 years or older: Should be given PCV13 > 1 year after their most recent PPSV23    Review of Systems   Constitutional: Negative for chills and fever.   HENT: Negative for congestion, ear pain, hearing loss and sore throat.    Eyes: Negative for pain and visual disturbance.   Respiratory: Negative for cough and shortness of breath.    Cardiovascular: Negative for chest pain, palpitations and peripheral edema.   Gastrointestinal: Negative for abdominal pain, constipation, diarrhea, heartburn, hematochezia and nausea.   Genitourinary: Negative for discharge, dysuria, frequency, genital sores, hematuria, impotence and urgency.   Musculoskeletal: Negative for arthralgias, joint swelling and myalgias.   Skin: Negative for rash.   Neurological: Negative for dizziness, weakness, headaches and paresthesias.   Psychiatric/Behavioral: Negative for mood changes. The patient is not nervous/anxious.      CONSTITUTIONAL: NEGATIVE for fever, chills, change in weight  INTEGUMENTARY/SKIN: NEGATIVE for worrisome rashes, moles or lesions  EYES: NEGATIVE for vision changes or irritation  ENT/MOUTH: NEGATIVE for ear, mouth and throat problems  RESP: NEGATIVE for significant cough or SOB  BREAST: NEGATIVE for masses, tenderness or discharge  CV: NEGATIVE for chest pain, palpitations or peripheral edema  GI: NEGATIVE for nausea, abdominal pain, heartburn, or change in bowel habits  : NEGATIVE for frequency, dysuria, or hematuria  MUSCULOSKELETAL: NEGATIVE for significant arthralgias or myalgia  NEURO: NEGATIVE for weakness, dizziness or paresthesias  ENDOCRINE: NEGATIVE for temperature intolerance, skin/hair changes  HEME: NEGATIVE for bleeding problems  PSYCHIATRIC: NEGATIVE for changes in mood or affect    OBJECTIVE:   BP 96/60 (BP Location: Right arm, Patient Position: Chair, Cuff Size: Adult Regular)   Pulse 54   Temp 98.2  F (36.8  C) (Oral)   Ht 1.638 m (5' 4.5\")   Wt 81.3 kg (179 lb 3.2 oz)  " " SpO2 95%   BMI 30.28 kg/m   Estimated body mass index is 30.28 kg/m  as calculated from the following:    Height as of this encounter: 1.638 m (5' 4.5\").    Weight as of this encounter: 81.3 kg (179 lb 3.2 oz).  Physical Exam  GENERAL: healthy, alert and no distress  EYES: Eyes grossly normal to inspection, PERRL and conjunctivae and sclerae normal  HENT: ear canals and TM's normal, nose and mouth without ulcers or lesions  NECK: no adenopathy, no asymmetry, masses, or scars and thyroid normal to palpation  RESP: lungs clear to auscultation - no rales, rhonchi or wheezes  CV: regular rate and rhythm, normal S1 S2, no S3 or S4, no murmur, click or rub, no peripheral edema and peripheral pulses strong  ABDOMEN: soft, nontender, no hepatosplenomegaly, no masses and bowel sounds normal   (male): normal male genitalia without lesions or urethral discharge, no hernia  MS: no gross musculoskeletal defects noted, no edema  SKIN: no suspicious lesions or rashes  NEURO: Normal strength and tone, mentation intact and speech normal  PSYCH: mentation appears normal, affect normal/bright    Diagnostic Test Results:  none     ASSESSMENT / PLAN:         2. Need for prophylactic vaccination against Streptococcus pneumoniae (pneumococcus)      3. Coronary artery disease involving native coronary artery of native heart without angina pectoris  Secondary risk factor modification   - ASPIRIN NOT PRESCRIBED (INTENTIONAL); Please choose reason not prescribed, below  - lisinopril (PRINIVIL/ZESTRIL) 5 MG tablet; Take 1 tablet (5 mg) by mouth daily  Dispense: 90 tablet; Refill: 0  - clopidogrel (PLAVIX) 75 MG tablet; Take 1 tablet (75 mg) by mouth daily  Dispense: 90 tablet; Refill: 3    4. Impaired fasting glucose  Weight management and metformin.   - metFORMIN (GLUCOPHAGE) 500 MG tablet; Take 1 tablet (500 mg) by mouth daily (with breakfast)  Dispense: 90 tablet; Refill: 3  - Hemoglobin A1c  - Albumin Random Urine Quantitative with " "Creat Ratio    5. Mixed hyperlipidemia    - ezetimibe-simvastatin (VYTORIN) 10-20 MG tablet; Take 1 tablet by mouth At Bedtime  Dispense: 90 tablet; Refill: 0    6. Special screening for malignant neoplasms, colon    - GASTROENTEROLOGY ADULT REF PROCEDURE ONLY    7. Encounter for routine adult health examination without abnormal findings  Discussed diet, exercise, testicular self exam, blood pressure, cholesterol, and need for cancer surveillance at appropriate ages.     8. Mixed conductive and sensorineural hearing loss, unspecified laterality  Referred for hearing evaluation.   - OTOLARYNGOLOGY REFERRAL      COUNSELING:  Reviewed preventive health counseling, as reflected in patient instructions       Regular exercise       Healthy diet/nutrition       Vision screening       Hearing screening       Colon cancer screening       Prostate cancer screening    BP Readings from Last 1 Encounters:   03/05/19 96/60     Estimated body mass index is 30.28 kg/m  as calculated from the following:    Height as of this encounter: 1.638 m (5' 4.5\").    Weight as of this encounter: 81.3 kg (179 lb 3.2 oz).      Weight management plan: Patient was referred to their PCP to discuss a diet and exercise plan.     reports that he quit smoking about 18 years ago. he has never used smokeless tobacco.      Appropriate preventive services were discussed with this patient, including applicable screening as appropriate for cardiovascular disease, diabetes, osteopenia/osteoporosis, and glaucoma.  As appropriate for age/gender, discussed screening for colorectal cancer, prostate cancer, breast cancer, and cervical cancer. Checklist reviewing preventive services available has been given to the patient.    Reviewed patients plan of care and provided an AVS. The Basic Care Plan (routine screening as documented in Health Maintenance) for Guillermo meets the Care Plan requirement. This Care Plan has been established and reviewed with the " Patient.    Counseling Resources:  ATP IV Guidelines  Pooled Cohorts Equation Calculator  Breast Cancer Risk Calculator  FRAX Risk Assessment  ICSI Preventive Guidelines  Dietary Guidelines for Americans, 2010  [employer]'s MyPlate  ASA Prophylaxis  Lung CA Screening    Kvng Smith MD  [facility]

## 2019-04-01 DIAGNOSIS — R73.01 IMPAIRED FASTING GLUCOSE: ICD-10-CM

## 2019-04-01 NOTE — TELEPHONE ENCOUNTER
Requested Prescriptions   Pending Prescriptions Disp Refills     metFORMIN (GLUCOPHAGE) 500 MG tablet 90 tablet 3     Sig: Take 1 tablet (500 mg) by mouth daily (with breakfast)    There is no refill protocol information for this order        Patient states that when he spoke with his pharmacy that they said that he didn't have any refills left. Informed patient that our records show him to have three refills left. Patient requested refill request still be placed.    Holly Shfafer  FWF - TC/FD  4/1/2019 8:02 AM

## 2019-07-01 DIAGNOSIS — E78.2 MIXED HYPERLIPIDEMIA: ICD-10-CM

## 2019-07-01 DIAGNOSIS — I25.10 CORONARY ARTERY DISEASE INVOLVING NATIVE CORONARY ARTERY OF NATIVE HEART WITHOUT ANGINA PECTORIS: ICD-10-CM

## 2019-07-02 RX ORDER — EZETIMIBE AND SIMVASTATIN 10; 20 MG/1; MG/1
1 TABLET ORAL AT BEDTIME
Qty: 90 TABLET | Refills: 0 | Status: SHIPPED | OUTPATIENT
Start: 2019-07-02 | End: 2019-10-01

## 2019-07-02 RX ORDER — LISINOPRIL 5 MG/1
5 TABLET ORAL DAILY
Qty: 90 TABLET | Refills: 0 | Status: SHIPPED | OUTPATIENT
Start: 2019-07-02 | End: 2019-09-23

## 2019-07-02 NOTE — TELEPHONE ENCOUNTER
Prescription approved per [facility] Refill Protocol.  Rabia Santos RN

## 2019-07-02 NOTE — TELEPHONE ENCOUNTER
"Requested Prescriptions   Pending Prescriptions Disp Refills     ezetimibe-simvastatin (VYTORIN) 10-20 MG tablet  Last Written Prescription Date:  03/05/2019  Last Fill Quantity: 90 tablet,  # refills: 0   Last Office Visit: 3/5/2019 Kvng Smith MD   Future Office Visit:      90 tablet 0     Sig: Take 1 tablet by mouth At Bedtime       Statins Protocol Passed - 7/1/2019  8:40 PM        Passed - LDL on file in past 12 months     Recent Labs   Lab Test 02/08/19  0751   LDL 52             Passed - No abnormal creatine kinase in past 12 months     No lab results found.             Passed - Recent (12 mo) or future (30 days) visit within the authorizing provider's specialty     Patient had office visit in the last 12 months or has a visit in the next 30 days with authorizing provider or within the authorizing provider's specialty.  See \"Patient Info\" tab in inbasket, or \"Choose Columns\" in Meds & Orders section of the refill encounter.              Passed - Medication is active on med list        Passed - Patient is age 18 or older        lisinopril (PRINIVIL/ZESTRIL) 5 MG tablet  Last Written Prescription Date:  03/05/2019  Last Fill Quantity: 90 tablet,  # refills: 0   Last Office Visit: 3/5/2019   Future Office Visit:      90 tablet 0     Sig: Take 1 tablet (5 mg) by mouth daily       ACE Inhibitors (Including Combos) Protocol Passed - 7/1/2019  8:40 PM        Passed - Blood pressure under 140/90 in past 12 months     BP Readings from Last 3 Encounters:   03/05/19 96/60   02/08/19 100/62   10/04/18 126/78                 Passed - Recent (12 mo) or future (30 days) visit within the authorizing provider's specialty     Patient had office visit in the last 12 months or has a visit in the next 30 days with authorizing provider or within the authorizing provider's specialty.  See \"Patient Info\" tab in inbasket, or \"Choose Columns\" in Meds & Orders section of the refill encounter.              Passed - Medication is " active on med list        Passed - Patient is age 18 or older        Passed - Normal serum creatinine on file in past 12 months     Recent Labs   Lab Test 02/08/19  0751   CR 0.97             Passed - Normal serum potassium on file in past 12 months     Recent Labs   Lab Test 02/08/19  0751   POTASSIUM 4.2

## 2019-09-23 DIAGNOSIS — I25.10 CORONARY ARTERY DISEASE INVOLVING NATIVE CORONARY ARTERY OF NATIVE HEART WITHOUT ANGINA PECTORIS: ICD-10-CM

## 2019-09-23 RX ORDER — LISINOPRIL 5 MG/1
TABLET ORAL
Qty: 90 TABLET | Refills: 0 | OUTPATIENT
Start: 2019-09-23

## 2019-09-23 RX ORDER — LISINOPRIL 5 MG/1
5 TABLET ORAL DAILY
Qty: 90 TABLET | Refills: 0 | Status: SHIPPED | OUTPATIENT
Start: 2019-09-23 | End: 2020-01-02

## 2019-09-23 NOTE — TELEPHONE ENCOUNTER
"Requested Prescriptions   Pending Prescriptions Disp Refills     lisinopril (PRINIVIL/ZESTRIL) 5 MG tablet [Pharmacy Med Name: LISINOPRIL TAB 5MG]    Last Written Prescription Date:  7/2/2019  Last Fill Quantity: 90,  # refills: 0   Last office visit: 3/5/2019 with prescribing provider: Kvng Smith         Future Office Visit:     90 tablet 0     Sig: TAKE 1 TABLET DAILY       ACE Inhibitors (Including Combos) Protocol Passed - 9/23/2019  1:15 AM        Passed - Blood pressure under 140/90 in past 12 months     BP Readings from Last 3 Encounters:   03/05/19 96/60   02/08/19 100/62   10/04/18 126/78                 Passed - Recent (12 mo) or future (30 days) visit within the authorizing provider's specialty     Patient had office visit in the last 12 months or has a visit in the next 30 days with authorizing provider or within the authorizing provider's specialty.  See \"Patient Info\" tab in inbasket, or \"Choose Columns\" in Meds & Orders section of the refill encounter.              Passed - Medication is active on med list        Passed - Patient is age 18 or older        Passed - Normal serum creatinine on file in past 12 months     Recent Labs   Lab Test 02/08/19  0751   CR 0.97             Passed - Normal serum potassium on file in past 12 months     Recent Labs   Lab Test 02/08/19  0751   POTASSIUM 4.2               "

## 2019-09-24 NOTE — TELEPHONE ENCOUNTER
Prescription approved per [facility] Refill Protocol.  Berenice Ortiz, RN, BSN

## 2019-09-29 DIAGNOSIS — E78.2 MIXED HYPERLIPIDEMIA: ICD-10-CM

## 2019-09-29 NOTE — TELEPHONE ENCOUNTER
"Requested Prescriptions   Pending Prescriptions Disp Refills     ezetimibe-simvastatin (VYTORIN) 10-20 MG tablet [Pharmacy Med Name: EZETIM/SIMVA TAB 10-20MG] 90 tablet 0     Sig: TAKE 1 TABLET AT BEDTIME  Last Written Prescription Date:  07/02/2019  Last Fill Quantity: 90 tablet,  # refills: 0   Last office visit: 3/5/2019 with prescribing provider:  Kvng Smith MD   Future Office Visit:           Statins Protocol Passed - 9/29/2019 11:01 AM        Passed - LDL on file in past 12 months     Recent Labs   Lab Test 02/08/19  0751   LDL 52             Passed - No abnormal creatine kinase in past 12 months     No lab results found.             Passed - Recent (12 mo) or future (30 days) visit within the authorizing provider's specialty     Patient has had an office visit with the authorizing provider or a provider within the authorizing providers department within the previous 12 mos or has a future within next 30 days. See \"Patient Info\" tab in inbasket, or \"Choose Columns\" in Meds & Orders section of the refill encounter.              Passed - Medication is active on med list        Passed - Patient is age 18 or older          "

## 2019-10-01 RX ORDER — EZETIMIBE AND SIMVASTATIN 10; 20 MG/1; MG/1
1 TABLET ORAL AT BEDTIME
Qty: 90 TABLET | Refills: 1 | Status: SHIPPED | OUTPATIENT
Start: 2019-10-01 | End: 2020-03-24

## 2019-10-01 RX ORDER — EZETIMIBE AND SIMVASTATIN 10; 20 MG/1; MG/1
TABLET ORAL
Qty: 90 TABLET | Refills: 1 | OUTPATIENT
Start: 2019-10-01

## 2019-10-01 NOTE — TELEPHONE ENCOUNTER
Prescription approved per [facility] [place]ill Protocol.  Berenice Ortiz, RN, BSN

## 2019-11-06 ENCOUNTER — OFFICE VISIT (OUTPATIENT)
Dept: URGENT CARE | Facility: URGENT CARE | Age: 69
End: 2019-11-06
Payer: COMMERCIAL

## 2019-11-06 ENCOUNTER — HOSPITAL ENCOUNTER (EMERGENCY)
Facility: CLINIC | Age: 69
Discharge: HOME | End: 2019-11-06
Attending: EMERGENCY MEDICINE | Admitting: EMERGENCY MEDICINE
Payer: COMMERCIAL

## 2019-11-06 VITALS
TEMPERATURE: 97.8 F | SYSTOLIC BLOOD PRESSURE: 131 MMHG | WEIGHT: 185 LBS | BODY MASS INDEX: 30.82 KG/M2 | HEART RATE: 48 BPM | DIASTOLIC BLOOD PRESSURE: 85 MMHG | RESPIRATION RATE: 14 BRPM | OXYGEN SATURATION: 94 % | HEIGHT: 65 IN

## 2019-11-06 VITALS
HEART RATE: 57 BPM | OXYGEN SATURATION: 97 % | SYSTOLIC BLOOD PRESSURE: 138 MMHG | DIASTOLIC BLOOD PRESSURE: 70 MMHG | WEIGHT: 180 LBS | TEMPERATURE: 96.7 F | BODY MASS INDEX: 30.42 KG/M2

## 2019-11-06 DIAGNOSIS — R07.9 CHEST PAIN, UNSPECIFIED TYPE: Primary | ICD-10-CM

## 2019-11-06 DIAGNOSIS — M79.632 PAIN OF LEFT FOREARM: ICD-10-CM

## 2019-11-06 LAB
ANION GAP SERPL CALCULATED.3IONS-SCNC: 5 MMOL/L (ref 3–14)
BASOPHILS # BLD AUTO: 0 10E9/L (ref 0–0.2)
BASOPHILS NFR BLD AUTO: 0.3 %
BUN SERPL-MCNC: 11 MG/DL (ref 7–30)
CALCIUM SERPL-MCNC: 9 MG/DL (ref 8.5–10.1)
CHLORIDE SERPL-SCNC: 105 MMOL/L (ref 94–109)
CO2 SERPL-SCNC: 27 MMOL/L (ref 20–32)
CREAT SERPL-MCNC: 0.92 MG/DL (ref 0.66–1.25)
DIFFERENTIAL METHOD BLD: NORMAL
EOSINOPHIL # BLD AUTO: 0.1 10E9/L (ref 0–0.7)
EOSINOPHIL NFR BLD AUTO: 1.7 %
ERYTHROCYTE [DISTWIDTH] IN BLOOD BY AUTOMATED COUNT: 14.2 % (ref 10–15)
GFR SERPL CREATININE-BSD FRML MDRD: 86 ML/MIN/{1.73_M2}
GLUCOSE SERPL-MCNC: 103 MG/DL (ref 70–99)
HCT VFR BLD AUTO: 48.9 % (ref 40–53)
HGB BLD-MCNC: 16.4 G/DL (ref 13.3–17.7)
IMM GRANULOCYTES # BLD: 0 10E9/L (ref 0–0.4)
IMM GRANULOCYTES NFR BLD: 0.3 %
LYMPHOCYTES # BLD AUTO: 3.5 10E9/L (ref 0.8–5.3)
LYMPHOCYTES NFR BLD AUTO: 45.8 %
MCH RBC QN AUTO: 30 PG (ref 26.5–33)
MCHC RBC AUTO-ENTMCNC: 33.5 G/DL (ref 31.5–36.5)
MCV RBC AUTO: 90 FL (ref 78–100)
MONOCYTES # BLD AUTO: 0.5 10E9/L (ref 0–1.3)
MONOCYTES NFR BLD AUTO: 6.4 %
NEUTROPHILS # BLD AUTO: 3.5 10E9/L (ref 1.6–8.3)
NEUTROPHILS NFR BLD AUTO: 45.5 %
NRBC # BLD AUTO: 0 10*3/UL
NRBC BLD AUTO-RTO: 0 /100
PLATELET # BLD AUTO: 188 10E9/L (ref 150–450)
POTASSIUM SERPL-SCNC: 3.8 MMOL/L (ref 3.4–5.3)
RBC # BLD AUTO: 5.46 10E12/L (ref 4.4–5.9)
SODIUM SERPL-SCNC: 137 MMOL/L (ref 133–144)
TROPONIN I SERPL-MCNC: <0.015 UG/L (ref 0–0.04)
TROPONIN I SERPL-MCNC: <0.015 UG/L (ref 0–0.04)
TSH SERPL DL<=0.005 MIU/L-ACNC: 1.79 MU/L (ref 0.4–4)
WBC # BLD AUTO: 7.6 10E9/L (ref 4–11)

## 2019-11-06 PROCEDURE — 93005 ELECTROCARDIOGRAM TRACING: CPT

## 2019-11-06 PROCEDURE — 85025 COMPLETE CBC W/AUTO DIFF WBC: CPT | Performed by: EMERGENCY MEDICINE

## 2019-11-06 PROCEDURE — 84443 ASSAY THYROID STIM HORMONE: CPT | Performed by: EMERGENCY MEDICINE

## 2019-11-06 PROCEDURE — 80048 BASIC METABOLIC PNL TOTAL CA: CPT | Performed by: EMERGENCY MEDICINE

## 2019-11-06 PROCEDURE — 99284 EMERGENCY DEPT VISIT MOD MDM: CPT

## 2019-11-06 PROCEDURE — 84484 ASSAY OF TROPONIN QUANT: CPT | Performed by: EMERGENCY MEDICINE

## 2019-11-06 PROCEDURE — 99215 OFFICE O/P EST HI 40 MIN: CPT | Performed by: FAMILY MEDICINE

## 2019-11-06 PROCEDURE — 93000 ELECTROCARDIOGRAM COMPLETE: CPT | Performed by: FAMILY MEDICINE

## 2019-11-06 ASSESSMENT — ENCOUNTER SYMPTOMS
SHORTNESS OF BREATH: 0
ABDOMINAL PAIN: 0
MYALGIAS: 1

## 2019-11-06 ASSESSMENT — MIFFLIN-ST. JEOR: SCORE: 1541.03

## 2019-11-06 NOTE — ED AVS SNAPSHOT
Emergency Department  [address] [facility] [address]  Phone:  [phone]  Fax:  [phone]                                    Guillermo Ford   MRN: [id number]    Department:   Emergency Department   Date of Visit:  11/6/2019           After Visit Summary Signature Page    I have received my discharge instructions, and my questions have been answered. I have discussed any challenges I see with this plan with the nurse or doctor.    ..........................................................................................................................................  Patient/Patient Representative Signature      ..........................................................................................................................................  Patient Representative Print Name and Relationship to Patient    ..................................................               ................................................  Date                                   Time    ..........................................................................................................................................  Reviewed by Signature/Title    ...................................................              ..............................................  Date                                               Time          22EPIC Rev 08/18

## 2019-11-07 LAB — INTERPRETATION ECG - MUSE: NORMAL

## 2019-11-07 NOTE — ED PROVIDER NOTES
History     Chief Complaint:    Arm Pain    The history is provided by the patient.      Guillermo Ford is a 69 year old male with a pervious myocardial infarction, coronary artery disease, hyperlipidemia, and hypertension on Plavix who presents with left arm pain. The patient reports that it began this morning and he is unsure if he slept on it weird or strained it yesterday. He describes it as a soreness and it is not a shooting pain. He denies any chest pain, shortness of breath, rashes, abdominal pain, or extremity swelling. The patient denies any recent falls or injuries. He denies any history of blood clots. The patient went to urgent care and had a reassuring EKG, but was sent here for further blood work and testing. Of note: during the patient's previous MI, he only experienced chest tightness and arm pain similar to today's.    Cardiac/PE/DVT Risk Factors:  The patient has a history of hypertension, hyperlipidemia, and smoking. He reports a family history of diabetes and coronary artery disease. The patient denies any personal or familial history of PE, DVT, or clotting disorder. The patient reports no recent travel, surgery, or other immobilizations.     Allergies:  No Known Drug Allergies     Medications:    Plavix  Vytorin  Lisinopril  Glucophage  Toprol-XL  Nitrostat  Revatio    Past Medical History:    Benign [facility] hypertension  Acute inferior wall MI  Left ventricular diastolic dysfunction  Impaired fasting glucose  Coronary artery disease  Hyperlipidemia  Sleep apnea  GERD  Lumbar spinal stenosis  Diverticulitis of colon  Gout   Lumbago  Alcohol dependence in remission    Past Surgical History:    Coronary stent percut, initial vessel  Repair of nasal septum    Family History:    Coronary artery disease - mother  Coronary artery disease - father  Diabetes - brother    Social History:  Negative for tobacco use - former smoker, quit 2000.  Negative for alcohol use.  Negative for drug use.  Patient  "accompanied to the ER by their significant other.  Marital Status:   [4]     Review of Systems   Respiratory: Negative for shortness of breath.    Cardiovascular: Negative for chest pain and leg swelling.   Gastrointestinal: Negative for abdominal pain.   Musculoskeletal: Positive for myalgias (left arm).   Skin: Negative for rash.   All other systems reviewed and are negative.    Physical Exam     Patient Vitals for the past 24 hrs:   BP Temp Temp src Pulse Heart Rate Resp SpO2 Height Weight   11/06/19 2300 131/85 -- -- (!) 48 (!) 46 -- 94 % -- --   11/06/19 2245 130/79 -- -- (!) 46 (!) 46 14 93 % -- --   11/06/19 2230 121/79 -- -- 54 50 18 94 % -- --   11/06/19 2215 103/59 -- -- 52 (!) 49 19 95 % -- --   11/06/19 2200 94/62 -- -- (!) 46 (!) 46 (!) 51 94 % -- --   11/06/19 2145 98/61 -- -- (!) 47 (!) 47 24 94 % -- --   11/06/19 2130 113/68 -- -- (!) 47 53 20 91 % -- --   11/06/19 2115 128/79 -- -- (!) 49 (!) 49 15 91 % -- --   11/06/19 2100 120/82 -- -- (!) 48 (!) 47 11 90 % -- --   11/06/19 2045 123/74 -- -- 51 (!) 48 13 92 % -- --   11/06/19 2030 128/86 -- -- (!) 49 (!) 49 18 94 % -- --   11/06/19 2015 121/76 -- -- -- -- -- -- -- --   11/06/19 1947 134/80 97.8  F (36.6  C) Oral -- 50 16 96 % 1.651 m (5' 5\") 83.9 kg (185 lb)     Physical Exam  General: Alert, appears elderly, otherwise well-developed and well-nourished. Cooperative.     In no distress  HEENT:  Head:  Atraumatic  Ears:  External ears are normal  Mouth/Throat:  Oropharynx is without erythema or exudate and mucous membranes are moist.   Eyes:   Conjunctivae normal and EOM are normal. No scleral icterus.  CV:  Normal rate, regular rhythm, normal heart sounds and radial pulses are 2+ and symmetric.  No murmur.  Resp:  Breath sounds are clear bilaterally    Non-labored, no retractions or accessory muscle use  GI:  Abdomen is soft, no distension, no tenderness. No rebound or guarding.  No CVA tenderness bilaterally  MS:  Normal range of motion. " No edema.    Normal strength in all 4 extremities.     Back atraumatic.    No midline cervical, thoracic, or lumbar tenderness    Left forearm with no rash, no pain to palpation, no deformity.  Distal CMS to LUE intact.    Skin:  Warm and dry.  No rash or lesions noted.  Neuro: Alert. Normal strength.  GCS: 15  Psych:  Normal mood and affect.    Emergency Department Course     ECG (20:07:29):  Rate 48 bpm. ND interval 182. QRS duration 138. QT/QTc 468/418. P-R-T axes 51 33 14. Sinus bradycardia. Right bundle branch block. Abnormal ECG. Interpreted at 2010 by Guillermo Sorensen MD.    Laboratory:  Laboratory findings were communicated with the patient who voiced understanding of the findings.    CBC: AWNL. (WBC 7.6, HGB 16.4, )   BMP: Glucose 103 H o/w WNL (Creatinine 0.92)  Troponin I (2052): <0.015  Troponin I (2250): <0.015  TSH with free T4 reflex: 1.79    Emergency Department Course:  Past medical records, nursing notes, and vitals reviewed.    1959:  performed an exam of the patient as documented above.     EKG obtained in the ED, see results above.     IV was inserted and blood was drawn for laboratory testing, results above.    2109: Patient rechecked and updated.      2303: Patient rechecked and updated.    I personally reviewed the laboratory and EKG results with the Patient and significant other and answered all related questions prior to discharge.     Findings and plan explained to the Patient and significant other. Patient discharged home with instructions regarding supportive care, medications, and reasons to return. The importance of close follow-up was reviewed.     Impression & Plan     Medical Decision Making:    Guillermo Ford is a 69 year old male with a history of coronary artery disease, GERD, and hypertension who presents to the emergency department today with left forearm discomfort. Patient is extremely concerned as his prior ACS presentation involved left arm pain associated with chest  pain. Reassuringly patient does not have any chest pain or shortness of breath. The left forearm pain is slightly atypical, but reassuringly he does not have any signs of rash, numbness, or strength difficulties to the left arm. No swelling to left arm.  Low concern for DVT, shingles, CVA.  No focal neurological deficits. Patient had an EKG showing no concerning ischemic changes. His initial troponin was undetectable and reassuring blood work that was otherwise normal. A repeat 2-hour delta troponin was undetectable as well. In the setting of no active chest pain or rather just left forearm discomfort, I have very low concern this is representative of a cardiac etiology. Patient was reassured by our findings here tonight. No indication for x-ray imaging as there was no trauma to the left forearm. This may represent a paraesthesia given the patient's unclear etiology of his symptoms tonight. Follow up with primary care indicated in the next week if symptoms persist. Return precautions for new or worsening symptoms given. Discharged home.     Discharge Diagnosis:    ICD-10-CM   1. Pain of left forearm M79.632     Disposition:  Discharged to home.     Scribe Disclosure:  Bri MARTÍNEZ, am serving as a scribe at 8:02 PM on 11/6/2019 to document services personally performed by Guillermo Sorensen MD based on my observations and the provider's statements to me.     Bri Montilla  11/6/2019    EMERGENCY DEPARTMENT     Guillermo Sorensen MD  11/07/19 0157

## 2019-11-07 NOTE — PROGRESS NOTES
"SUBJECTIVE:  Guillermo Ford is a 69 year old male who presents to the office with the CC of left arm discomfort.    Patient here with concerns of left arm pain, had similar symptoms of left arm pain when he had his heart attack back in 2005.  Patient states that at that time had indigestion and left arm pain.  Patient has had cardiac stress and echo tests regularly and have been normal.  Patient currently on plavix for 1 stent.    Patient woke up today and felt well, thinks about mid-morning that his left arm started to bother him.  This has persisted most of the day so he decided to come in.  Patient denies any indigestion and SOB related to this episode, no recent URI symptoms, no fever, no trauma to arm.    Past Medical History:   Diagnosis Date     Alcohol Dependence in Remission     sober since 1996     CAD (coronary artery disease)     cardiac cath 2005: stent to PDA     Diverticulitis of colon 6/9/2008    Diagnosed after diarrheal episode from colchicine; \"Probable\" on CT scan 6/08.     GERD (gastroesophageal reflux disease)      Gout      History of acute inferior wall MI     2005     Left ventricular diastolic dysfunction      Mixed hyperlipidemia      Sleep apnea     C PAP         Current Outpatient Medications:      ASPIRIN NOT PRESCRIBED (INTENTIONAL), Please choose reason not prescribed, below, Disp: , Rfl:      clopidogrel (PLAVIX) 75 MG tablet, Take 1 tablet (75 mg) by mouth daily, Disp: 90 tablet, Rfl: 3     ezetimibe-simvastatin (VYTORIN) 10-20 MG tablet, Take 1 tablet by mouth At Bedtime, Disp: 90 tablet, Rfl: 1     lisinopril (PRINIVIL/ZESTRIL) 5 MG tablet, Take 1 tablet (5 mg) by mouth daily, Disp: 90 tablet, Rfl: 0     metFORMIN (GLUCOPHAGE) 500 MG tablet, Take 1 tablet (500 mg) by mouth daily (with breakfast), Disp: 90 tablet, Rfl: 3     metoprolol succinate ER (TOPROL-XL) 100 MG 24 hr tablet, Take 1 tablet (100 mg) by mouth daily, Disp: 90 tablet, Rfl: 3     nitroglycerin (NITROSTAT) 0.4 MG " sublingual tablet, Place 1 tablet (0.4 mg) under the tongue every 5 minutes as needed for chest pain May repeat X 2. If no relief after 3 tablets call 911, Disp: 25 tablet, Rfl: 2     sildenafil (REVATIO) 20 MG tablet, TAKE 2 TABLETS BY MOUTH DAILY AS NEEDED. NEVER USE WITH NITROGLYCERIN, TERAZOSIN OR DOXAZOSIN, Disp: 60 tablet, Rfl: 10    Social History     Tobacco Use     Smoking status: Former Smoker     Last attempt to quit: 2000     Years since quittin.5     Smokeless tobacco: Never Used     Tobacco comment: previous 2 ppd smoker   Substance Use Topics     Alcohol use: No     Alcohol/week: 0.0 standard drinks       ROS:Review of systems negative except as stated above.    EXAM:  /70 (BP Location: Right arm)   Pulse 57   Temp 96.7  F (35.9  C) (Tympanic)   Wt 81.6 kg (180 lb)   SpO2 97%   BMI 30.42 kg/m    GENERAL APPEARANCE: healthy, alert and no distress  RESP: lungs clear to auscultation - no rales, rhonchi or wheezes  CV: regular rates and rhythm, normal S1 S2, no murmur noted  Extremities: no peripheral edema or tenderness, peripheral pulses normal  SKIN: no suspicious lesions or rashes  PSYCH: mentation appears normal and affect normal/bright     Office EKG demonstrates:  Sinus bradycardia, rate 55, RBBB. Q waves III    ASSESSMENT/PLAN:  (R07.9) Chest pain, unspecified type  (primary encounter diagnosis)  Comment: atypical  Plan: EKG 12-lead complete w/read - Clinics            Old EKG in 2005 with inferior infarct, has multiple cardiac stress tests and essentially noted either NSR or RBBB.  Not sure if changes in current EKG is reflective of old event but given that patient's first episode of heart attack was atypical that needs to obtain troponin, this will need to be in ER for evaluation.  Patient is stable and decline paramedic transport, vitals stable, patient will go via private car to [facility] ER for further evaluation, ER notified.    Wale Weber MD, MD    2019 7:19 PM

## 2019-11-12 DIAGNOSIS — N52.9 ERECTILE DYSFUNCTION, UNSPECIFIED ERECTILE DYSFUNCTION TYPE: ICD-10-CM

## 2019-11-12 NOTE — TELEPHONE ENCOUNTER
"Requested Prescriptions   Pending Prescriptions Disp Refills     sildenafil (REVATIO) 20 MG tablet [Pharmacy Med Name: Sildenafil Citrate Oral Tablet 20 MG]  Last Written Prescription Date:  09/11/2018  Last Fill Quantity: 60 tablet,  # refills: 10   Last Office Visit: 3/5/2019 Kvng Smith MD   Future Office Visit:      60 tablet 9     Sig: TAKE 2 TABLETS BY MOUTH DAILY AS NEEDED. NEVER USE WITH NITROGLYCERIN, TERAZOSIN OR DOXAZOSIN       Erectile Dysfuction Protocol Failed - 11/12/2019  4:41 PM        Failed - Absence of nitrates on medication list        Passed - Absence of Alpha Blockers on Med list        Passed - Recent (12 mo) or future (30 days) visit within the authorizing provider's specialty     Patient has had an office visit with the authorizing provider or a provider within the authorizing providers department within the previous 12 mos or has a future within next 30 days. See \"Patient Info\" tab in inbasket, or \"Choose Columns\" in Meds & Orders section of the refill encounter.              Passed - Medication is active on med list        Passed - Patient is age 18 or older          "

## 2019-11-13 ENCOUNTER — TELEPHONE (OUTPATIENT)
Dept: PEDIATRICS | Facility: CLINIC | Age: 69
End: 2019-11-13

## 2019-11-13 DIAGNOSIS — G47.33 OSA (OBSTRUCTIVE SLEEP APNEA): Primary | ICD-10-CM

## 2019-11-13 RX ORDER — SILDENAFIL CITRATE 20 MG/1
TABLET ORAL
Qty: 60 TABLET | Refills: 1 | Status: SHIPPED | OUTPATIENT
Start: 2019-11-13 | End: 2020-07-13

## 2019-11-13 NOTE — TELEPHONE ENCOUNTER
Pt stopped in clinic; states he no longer needs/uses the nitroglycerin, per his cardiologist.  Please call pt with questions at [phone].  Thank you.  ángela

## 2019-11-13 NOTE — TELEPHONE ENCOUNTER
Routing refill request to provider for review/approval because:  Has Nitroglycerin on medication list  Diane FLOWERS - Registered Nurse  [facility]  Acute and Diagnostic Services

## 2019-11-13 NOTE — TELEPHONE ENCOUNTER
Med list udated.  Prescription approved per [facility] Refill Protocol.

## 2019-11-13 NOTE — TELEPHONE ENCOUNTER
Pt stopped in clinic today and asking if Dr Smith can write him a new order for a new CPAP? Current CPAP is 6 years old.   Last Physical 3/5/19.  Would Dr Smith be able to write new order or does he need to go back to [facility]?  Please call pt at [phone].  Thank you.  ángela

## 2019-11-14 NOTE — TELEPHONE ENCOUNTER
Called and spoke with patient. Patient will  DME order at front dest of [facility] today.     Eric Olivia CMA ([facility])

## 2019-12-31 ENCOUNTER — TELEPHONE (OUTPATIENT)
Dept: PEDIATRICS | Facility: CLINIC | Age: 69
End: 2019-12-31

## 2019-12-31 DIAGNOSIS — I25.10 CORONARY ARTERY DISEASE INVOLVING NATIVE CORONARY ARTERY OF NATIVE HEART WITHOUT ANGINA PECTORIS: ICD-10-CM

## 2019-12-31 NOTE — TELEPHONE ENCOUNTER
Pt request refills  to be mailed to his address from [facility] home delivery  fax nmbr: [phone]    Pt said he was told that his provider needed to see him before further refills...  Can you please look into this and call him if he needs an office visit as he is almost out of his medication. Thank you!

## 2020-01-02 RX ORDER — LISINOPRIL 5 MG/1
5 TABLET ORAL DAILY
Qty: 90 TABLET | Refills: 0 | Status: SHIPPED | OUTPATIENT
Start: 2020-01-02 | End: 2020-03-24

## 2020-01-02 RX ORDER — METOPROLOL SUCCINATE 100 MG/1
100 TABLET, EXTENDED RELEASE ORAL DAILY
Qty: 90 TABLET | Refills: 3 | Status: SHIPPED | OUTPATIENT
Start: 2020-01-02 | End: 2020-11-02

## 2020-01-02 NOTE — TELEPHONE ENCOUNTER
"Requested Prescriptions   Pending Prescriptions Disp Refills     metoprolol succinate ER (TOPROL-XL) 100 MG 24 hr tablet 90 tablet 3     Sig: Take 1 tablet (100 mg) by mouth daily   Last Written Prescription Date:  1/16/19  Last Fill Quantity: 90,  # refills: 3   Last office visit: 3/5/2019 with prescribing provider   Future Office Visit:        Beta-Blockers Protocol Passed - 12/31/2019 11:26 AM        Passed - Blood pressure under 140/90 in past 12 months     BP Readings from Last 3 Encounters:   11/06/19 131/85 11/06/19 138/70   03/05/19 96/60                 Passed - Patient is age 6 or older        Passed - Recent (12 mo) or future (30 days) visit within the authorizing provider's specialty     Patient has had an office visit with the authorizing provider or a provider within the authorizing providers department within the previous 12 mos or has a future within next 30 days. See \"Patient Info\" tab in inbasket, or \"Choose Columns\" in Meds & Orders section of the refill encounter.              Passed - Medication is active on med list        lisinopril (PRINIVIL/ZESTRIL) 5 MG tablet 90 tablet 0     Sig: Take 1 tablet (5 mg) by mouth daily   Last Written Prescription Date:  9/23/19  Last Fill Quantity: 90,  # refills: 0   Last office visit: 3/5/2019 with prescribing provider   Future Office Visit:        ACE Inhibitors (Including Combos) Protocol Passed - 12/31/2019 11:26 AM        Passed - Blood pressure under 140/90 in past 12 months     BP Readings from Last 3 Encounters:   11/06/19 131/85   11/06/19 138/70   03/05/19 96/60                 Passed - Recent (12 mo) or future (30 days) visit within the authorizing provider's specialty     Patient has had an office visit with the authorizing provider or a provider within the authorizing providers department within the previous 12 mos or has a future within next 30 days. See \"Patient Info\" tab in inbasket, or \"Choose Columns\" in Meds & Orders section of the refill " encounter.              Passed - Medication is active on med list        Passed - Patient is age 18 or older        Passed - Normal serum creatinine on file in past 12 months     Recent Labs   Lab Test 11/06/19 2016   CR 0.92             Passed - Normal serum potassium on file in past 12 months     Recent Labs   Lab Test 11/06/19 2016   POTASSIUM 3.8             Prescription approved per [facility] Refill Protocol.  Fartun Carmichael RN on 1/2/2020 at 12:03 PM

## 2020-01-27 ENCOUNTER — OFFICE VISIT (OUTPATIENT)
Dept: CARDIOLOGY | Facility: CLINIC | Age: 70
End: 2020-01-27
Attending: INTERNAL MEDICINE
Payer: COMMERCIAL

## 2020-01-27 VITALS
HEIGHT: 65 IN | DIASTOLIC BLOOD PRESSURE: 70 MMHG | HEART RATE: 56 BPM | WEIGHT: 184 LBS | SYSTOLIC BLOOD PRESSURE: 106 MMHG | BODY MASS INDEX: 30.66 KG/M2 | OXYGEN SATURATION: 96 %

## 2020-01-27 DIAGNOSIS — I25.10 CORONARY ARTERY DISEASE INVOLVING NATIVE CORONARY ARTERY OF NATIVE HEART WITHOUT ANGINA PECTORIS: ICD-10-CM

## 2020-01-27 PROCEDURE — 99214 OFFICE O/P EST MOD 30 MIN: CPT | Performed by: INTERNAL MEDICINE

## 2020-01-27 ASSESSMENT — MIFFLIN-ST. JEOR: SCORE: 1528.56

## 2020-01-27 NOTE — PROGRESS NOTES
"CARDIOLOGY CLINIC CONSULTATION    REASON FOR CONSULT: Coronary artery disease    PRIMARY CARE PHYSICIAN:  Kvng Smith    HISTORY OF PRESENT ILLNESS:  This a very pleasant 69-year-old gentleman.  He is to follow-up with Dr. Quezada for many years.  He had an inferior wall myocardial infarction in 2005.  Since then he has not had any cardiac events.  His EF is normal.  He has had absolutely no cardiac symptoms but has had multiple stress test for routine follow-up with Dr. Quezada in the past.  His last study was in 2017 and prior to that in 2015.  This is shown inferior wall infarction without ischemia.  He was last seen in cardiology clinic a year ago and now is here for routine follow-up.  Denies any sort of cardiovascular symptoms.  He is functionally very active without chest pain shortness of breath palpitations syncope presyncope.  In November 2019 the patient had tingling in his left arm that lasted for a day.  He was in the ER with negative ECG and troponins.  Symptoms went away after he slept that night.  Nothing is recurred ever since.  He thinks this was due to lifting heavy boxes.    Last year when he was seen in the cardiology clinic prior to which he was on dual antiplatelet therapy for many years, he was taken off his aspirin and plan was to leave him on Plavix monotherapy.  He denies any strokes.  He complains of significantly easy bleeding and says that if he were to cut himself during shaving he would bleed all day long.    PAST MEDICAL HISTORY:  Past Medical History:   Diagnosis Date     Alcohol Dependence in Remission     sober since 1996     CAD (coronary artery disease)     cardiac cath 2005: stent to PDA     Diverticulitis of colon 6/9/2008    Diagnosed after diarrheal episode from colchicine; \"Probable\" on CT scan 6/08.     GERD (gastroesophageal reflux disease)      Gout      History of acute inferior wall MI     2005     Left ventricular diastolic dysfunction      Mixed hyperlipidemia      " Sleep apnea     C PAP       MEDICATIONS:  Current Outpatient Medications   Medication     aspirin (ASA) 81 MG tablet     ASPIRIN NOT PRESCRIBED (INTENTIONAL)     ezetimibe-simvastatin (VYTORIN) 10-20 MG tablet     lisinopril (PRINIVIL/ZESTRIL) 5 MG tablet     metFORMIN (GLUCOPHAGE) 500 MG tablet     metoprolol succinate ER (TOPROL-XL) 100 MG 24 hr tablet     order for DME     sildenafil (REVATIO) 20 MG tablet     No current facility-administered medications for this visit.        ALLERGIES:  No Known Allergies    SOCIAL HISTORY:  I have reviewed this patient's social history and updated it with pertinent information if needed. Guillermo Ford  reports that he quit smoking about 19 years ago. He has never used smokeless tobacco. He reports that he does not drink alcohol or use drugs.    FAMILY HISTORY:  I have reviewed this patient's family history and updated it with pertinent information if needed.   Family History   Problem Relation Age of Onset     C.A.D. Mother      C.A.D. Father      Diabetes Brother         unsure of type 1/2     Family History Negative Sister      Family History Negative Son      Family History Negative Daughter      Family History Negative Brother      Cancer - colorectal No family hx of      Prostate Cancer No family hx of        REVIEW OF SYSTEMS:  Skin:  Positive for     Eyes:  Positive for glasses  ENT:  Negative    Respiratory:  Positive for sleep apnea;CPAP  Cardiovascular:  Negative    Gastroenterology: Negative    Genitourinary:  Negative    Musculoskeletal:  Positive for arthritis  Neurologic:  Negative    Psychiatric:  Negative    Heme/Lymph/Imm:  Positive for easy bruising  Endocrine:  Positive for diabetes      PHYSICAL EXAM:      BP: 106/70 Pulse: 56     SpO2: 96 %      Vital Signs with Ranges  Pulse:  [56] 56  BP: (106)/(70) 106/70  SpO2:  [96 %] 96 %  184 lbs 0 oz    Constitutional: awake, alert, no distress  Eyes: PERRL, sclera nonicteric  ENT: trachea midline  Respiratory:  Clear to auscultation bilaterally  Cardiovascular: Normal first and second heart sounds without murmur rubs or gallops.  GI: nondistended, nontender, bowel sounds present  Lymph/Hematologic: no bleeding signs  Skin: dry, no rash  Musculoskeletal: good muscle tone, strength 5/5 in upper and lower extremities  Neurologic: no focal deficits  Neuropsychiatric: appropriate affact    DATA:  Labs: Pertinent cardiac labs reviewed    Echocardiogram: Normal LV function    ASSESSMENT:  Stable coronary artery disease with inferior wall myocardial infarction status post percutaneous coronary intervention in 2005  Obesity  Hypertension  Diabetes  Sleep apnea    RECOMMENDATIONS:  1. Patient is completely asymptomatic from a cardiovascular standpoint.  He is functionally very active.  Given his bleeding, I have told him to stop his Plavix and switch to aspirin 81 mg daily.  He will overlap the 2 drugs for about a week and then discontinue Plavix.  2. Continue statin his last LDL was at goal we will recheck this in a year. He will return to clinic in 1 year for an echocardiogram and routine cardiology follow-up, sooner if anything changes clinically.      The patient was asking me that Dr. Quezada told him to do routine screening stress test every 2 years.  He was wondering if we need to do that again.  I went over data of the COURAGE and ISCHEMIA trials with the patient.  Further he is completely asymptomatic at this time.  I explained to him the pathophysiology of how major adverse cardiovascular events happen both in the setting of stable and unstable coronary disease.  We also spent 15 minutes in direct conversation talking about data behind revascularization for patients with stable coronary disease.  He understands that a negative stress (even if one is done) does not rule out possibility of MACE. At the end of the visit he was happy with our conversation.  I have told him that should he have any sort of cardiovascular symptoms  he should seek immediate cardiology attention to see if further work-up is required. Healthy diet weight loss and exercise recommended.    KIRAN Zimmerman, [facility]  Cardiology - [facility] Heart  January 27, 2020

## 2020-01-27 NOTE — LETTER
1/27/2020    Kvng Smith MD  [address] [place] [address]    RE: Guillermo Ford       Dear Colleague,    I had the pleasure of seeing Guillermo Ford in the [facility].    CARDIOLOGY CLINIC CONSULTATION    REASON FOR CONSULT: Coronary artery disease    PRIMARY CARE PHYSICIAN:  Kvng Smith    HISTORY OF PRESENT ILLNESS:  This a very pleasant 69-year-old gentleman.  He is to follow-up with Dr. Quezada for many years.  He had an inferior wall myocardial infarction in 2005.  Since then he has not had any cardiac events.  His EF is normal.  He has had absolutely no cardiac symptoms but has had multiple stress test for routine follow-up with Dr. Quezada in the past.  His last study was in 2017 and prior to that in 2015.  This is shown inferior wall infarction without ischemia.  He was last seen in cardiology clinic a year ago and now is here for routine follow-up.  Denies any sort of cardiovascular symptoms.  He is functionally very active without chest pain shortness of breath palpitations syncope presyncope.  In November 2019 the patient had tingling in his left arm that lasted for a day.  He was in the ER with negative ECG and troponins.  Symptoms went away after he slept that night.  Nothing is recurred ever since.  He thinks this was due to lifting heavy boxes.    Last year when he was seen in the cardiology clinic prior to which he was on dual antiplatelet therapy for many years, he was taken off his aspirin and plan was to leave him on Plavix monotherapy.  He denies any strokes.  He complains of significantly easy bleeding and says that if he were to cut himself during shaving he would bleed all day long.    PAST MEDICAL HISTORY:  Past Medical History:   Diagnosis Date     Alcohol Dependence in Remission     sober since 1996     CAD (coronary artery disease)     cardiac cath 2005: stent to PDA     Diverticulitis of colon 6/9/2008    Diagnosed after diarrheal episode  "from colchicine; \"Probable\" on CT scan 6/08.     GERD (gastroesophageal reflux disease)      Gout      History of acute inferior wall MI     2005     Left ventricular diastolic dysfunction      Mixed hyperlipidemia      Sleep apnea     C PAP       MEDICATIONS:  Current Outpatient Medications   Medication     aspirin (ASA) 81 MG tablet     ASPIRIN NOT PRESCRIBED (INTENTIONAL)     ezetimibe-simvastatin (VYTORIN) 10-20 MG tablet     lisinopril (PRINIVIL/ZESTRIL) 5 MG tablet     metFORMIN (GLUCOPHAGE) 500 MG tablet     metoprolol succinate ER (TOPROL-XL) 100 MG 24 hr tablet     order for DME     sildenafil (REVATIO) 20 MG tablet     No current facility-administered medications for this visit.        ALLERGIES:  No Known Allergies    SOCIAL HISTORY:  I have reviewed this patient's social history and updated it with pertinent information if needed. Guillermo FLOWERS Yenyelizabeth  reports that he quit smoking about 19 years ago. He has never used smokeless tobacco. He reports that he does not drink alcohol or use drugs.    FAMILY HISTORY:  I have reviewed this patient's family history and updated it with pertinent information if needed.   Family History   Problem Relation Age of Onset     C.A.D. Mother      C.A.D. Father      Diabetes Brother         unsure of type 1/2     Family History Negative Sister      Family History Negative Son      Family History Negative Daughter      Family History Negative Brother      Cancer - colorectal No family hx of      Prostate Cancer No family hx of        REVIEW OF SYSTEMS:  Skin:  Positive for     Eyes:  Positive for glasses  ENT:  Negative    Respiratory:  Positive for sleep apnea;CPAP  Cardiovascular:  Negative    Gastroenterology: Negative    Genitourinary:  Negative    Musculoskeletal:  Positive for arthritis  Neurologic:  Negative    Psychiatric:  Negative    Heme/Lymph/Imm:  Positive for easy bruising  Endocrine:  Positive for diabetes      PHYSICAL EXAM:      BP: 106/70 Pulse: 56     SpO2: 96 " %      Vital Signs with Ranges  Pulse:  [56] 56  BP: (106)/(70) 106/70  SpO2:  [96 %] 96 %  184 lbs 0 oz    Constitutional: awake, alert, no distress  Eyes: PERRL, sclera nonicteric  ENT: trachea midline  Respiratory: Clear to auscultation bilaterally  Cardiovascular: Normal first and second heart sounds without murmur rubs or gallops.  GI: nondistended, nontender, bowel sounds present  Lymph/Hematologic: no bleeding signs  Skin: dry, no rash  Musculoskeletal: good muscle tone, strength 5/5 in upper and lower extremities  Neurologic: no focal deficits  Neuropsychiatric: appropriate affact    DATA:  Labs: Pertinent cardiac labs reviewed    Echocardiogram: Normal LV function    ASSESSMENT:  Stable coronary artery disease with inferior wall myocardial infarction status post percutaneous coronary intervention in 2005  Obesity  Hypertension  Diabetes  Sleep apnea    RECOMMENDATIONS:  1. Patient is completely asymptomatic from a cardiovascular standpoint.  He is functionally very active.  Given his bleeding, I have told him to stop his Plavix and switch to aspirin 81 mg daily.  He will overlap the 2 drugs for about a week and then discontinue Plavix.  2. Continue statin his last LDL was at goal we will recheck this in a year. He will return to clinic in 1 year for an echocardiogram and routine cardiology follow-up, sooner if anything changes clinically.      The patient was asking me that Dr. Quezada told him to do routine screening stress test every 2 years.  He was wondering if we need to do that again.  I went over data of the COURAGE and ISCHEMIA trials with the patient.  Further he is completely asymptomatic at this time.  I explained to him the pathophysiology of how major adverse cardiovascular events happen both in the setting of stable and unstable coronary disease.  We also spent 15 minutes in direct conversation talking about data behind revascularization for patients with stable coronary disease.  He  understands that a negative stress (even if one is done) does not rule out possibility of MACE. At the end of the visit he was happy with our conversation.  I have told him that should he have any sort of cardiovascular symptoms he should seek immediate cardiology attention to see if further work-up is required. Healthy diet weight loss and exercise recommended.    KIRAN Zimmerman, Merged with [facility]  Cardiology - [facility]  January 27, 2020      Thank you for allowing me to participate in the care of your patient.    Sincerely,     Patrice Fountain MD     [facility]

## 2020-01-29 ENCOUNTER — APPOINTMENT (OUTPATIENT)
Age: 70
Setting detail: DERMATOLOGY
End: 2020-01-30

## 2020-01-29 VITALS — RESPIRATION RATE: 14 BRPM | HEIGHT: 65 IN | WEIGHT: 184 LBS

## 2020-01-29 DIAGNOSIS — D18.0 HEMANGIOMA: ICD-10-CM

## 2020-01-29 DIAGNOSIS — L82.1 OTHER SEBORRHEIC KERATOSIS: ICD-10-CM

## 2020-01-29 PROBLEM — D18.01 HEMANGIOMA OF SKIN AND SUBCUTANEOUS TISSUE: Status: ACTIVE | Noted: 2020-01-29

## 2020-01-29 PROBLEM — D48.5 NEOPLASM OF UNCERTAIN BEHAVIOR OF SKIN: Status: ACTIVE | Noted: 2020-01-29

## 2020-01-29 PROCEDURE — OTHER BIOPSY BY SHAVE METHOD: OTHER

## 2020-01-29 PROCEDURE — OTHER PATHOLOGY BILLING: OTHER

## 2020-01-29 PROCEDURE — OTHER COUNSELING: OTHER

## 2020-01-29 PROCEDURE — 99203 OFFICE O/P NEW LOW 30 MIN: CPT | Mod: 25

## 2020-01-29 PROCEDURE — 88305 TISSUE EXAM BY PATHOLOGIST: CPT

## 2020-01-29 PROCEDURE — 11102 TANGNTL BX SKIN SINGLE LES: CPT

## 2020-01-29 ASSESSMENT — LOCATION DETAILED DESCRIPTION DERM
LOCATION DETAILED: RIGHT SUPERIOR MEDIAL UPPER BACK
LOCATION DETAILED: INFERIOR THORACIC SPINE
LOCATION DETAILED: LEFT INFERIOR CENTRAL MALAR CHEEK

## 2020-01-29 ASSESSMENT — LOCATION ZONE DERM
LOCATION ZONE: FACE
LOCATION ZONE: TRUNK

## 2020-01-29 ASSESSMENT — LOCATION SIMPLE DESCRIPTION DERM
LOCATION SIMPLE: UPPER BACK
LOCATION SIMPLE: LEFT CHEEK
LOCATION SIMPLE: RIGHT UPPER BACK

## 2020-01-29 NOTE — PROCEDURE: BIOPSY BY SHAVE METHOD
Hide Accession Number?: No
Hemostasis: Drysol
Additional Anesthesia Volume In Cc (Will Not Render If 0): 0
Biopsy Type: H and E
Type Of Destruction Used: Curettage
Depth Of Biopsy: dermis
Anesthesia Type: 2% lidocaine with epinephrine
Dressing: bandage
Notification Instructions: Patient will be notified of biopsy results. However, patient instructed to call the office if not contacted within 2 weeks.
Detail Level: Detailed
Cryotherapy Text: The wound bed was treated with cryotherapy after the biopsy was performed.
Consent: Written consent was obtained and risks were reviewed including but not limited to scarring, infection, bleeding, scabbing, incomplete removal, nerve damage and allergy to anesthesia.
Electrodesiccation Text: The wound bed was treated with electrodesiccation after the biopsy was performed.
Electrodesiccation And Curettage Text: The wound bed was treated with electrodesiccation and curettage after the biopsy was performed.
Was A Bandage Applied: Yes
Billing Type: Client Bill
Wound Care: Petrolatum
Biopsy Method: Dermablade
Anesthesia Volume In Cc (Will Not Render If 0): 0.3
Post-Care Instructions: I reviewed with the patient in detail post-care instructions. Patient is to keep the biopsy site dry overnight, and then apply bacitracin twice daily until healed. Patient may apply hydrogen peroxide soaks to remove any crusting.
Curettage Text: The wound bed was treated with curettage after the biopsy was performed.
Silver Nitrate Text: The wound bed was treated with silver nitrate after the biopsy was performed.

## 2020-01-29 NOTE — PROCEDURE: COUNSELING
Detail Level: Zone
Patient Specific Counseling (Will Not Stick From Patient To Patient): He may return and have some on his face and back treated cosmetically.

## 2020-01-29 NOTE — PROCEDURE: PATHOLOGY BILLING
Immunohistochemistry (12523 and [id number]) billing is not performed here. Please use the Immunohistochemistry Stain Billing plan to accomplish this. Immunohistochemistry ([id number] and [id number]) billing is not performed here. Please use the Immunohistochemistry Stain Billing plan to accomplish this.

## 2020-01-30 ENCOUNTER — OFFICE VISIT (OUTPATIENT)
Dept: PEDIATRICS | Facility: CLINIC | Age: 70
End: 2020-01-30
Payer: COMMERCIAL

## 2020-01-30 VITALS
TEMPERATURE: 98.6 F | BODY MASS INDEX: 31 KG/M2 | OXYGEN SATURATION: 95 % | SYSTOLIC BLOOD PRESSURE: 112 MMHG | WEIGHT: 186.1 LBS | HEIGHT: 65 IN | HEART RATE: 51 BPM | DIASTOLIC BLOOD PRESSURE: 72 MMHG

## 2020-01-30 DIAGNOSIS — G47.33 OSA (OBSTRUCTIVE SLEEP APNEA): Primary | ICD-10-CM

## 2020-01-30 PROCEDURE — 99213 OFFICE O/P EST LOW 20 MIN: CPT | Performed by: INTERNAL MEDICINE

## 2020-01-30 ASSESSMENT — MIFFLIN-ST. JEOR: SCORE: 1538.08

## 2020-01-30 NOTE — PROGRESS NOTES
Subjective     Guillermo Ford is a 69 year old male who presents to clinic today for the following health issues:    HPI     Patient in to receive new CPAP machine. Patient was told needed to be seen by PCP before receiving .    Using continuous positive airway pressure nightly. Would like a smaller mask and needed a face to face to appointment to get this.    Was told needed an appointment before filling.      Would like a smaller machine.      Sleeping about 7 hours per night.  No AM headache, or fatigue. No skin breakdown or nasal dryness.     Has questions about CT screening; quit 20 years ago.  Smoked 25 years, 1 packs per day.       Patient Active Problem List   Diagnosis     Lumbago     Gout     Diverticulitis of colon     Lumbar spinal stenosis     Coronary artery disease involving native coronary artery of native heart without angina pectoris     Mixed hyperlipidemia     Sleep apnea     GERD (gastroesophageal reflux disease)     Impaired fasting glucose     History of acute inferior wall MI     Left ventricular diastolic dysfunction     ACP (advance care planning)     Benign essential hypertension     Past Surgical History:   Procedure Laterality Date     HC CORONARY STENT PERCUT, INITIAL VESSEL  2005    PTCA with intracoronary stent placement of, distal RCA and ostial PDA     HC REPAIR OF NASAL SEPTUM      Septoplasty and uvulectomy       Social History     Tobacco Use     Smoking status: Former Smoker     Last attempt to quit: 2000     Years since quittin.7     Smokeless tobacco: Never Used     Tobacco comment: previous 2 ppd smoker   Substance Use Topics     Alcohol use: No     Alcohol/week: 0.0 standard drinks     Family History   Problem Relation Age of Onset     C.A.D. Mother      C.A.D. Father      Diabetes Brother         unsure of type 1/2     Family History Negative Sister      Family History Negative Son      Family History Negative Daughter      Family History Negative Brother       Cancer - colorectal No family hx of      Prostate Cancer No family hx of          Current Outpatient Medications   Medication Sig Dispense Refill     aspirin (ASA) 81 MG tablet Take 1 tablet (81 mg) by mouth daily 90 tablet 3     ezetimibe-simvastatin (VYTORIN) 10-20 MG tablet Take 1 tablet by mouth At Bedtime 90 tablet 1     lisinopril (PRINIVIL/ZESTRIL) 5 MG tablet Take 1 tablet (5 mg) by mouth daily 90 tablet 0     metFORMIN (GLUCOPHAGE) 500 MG tablet Take 1 tablet (500 mg) by mouth daily (with breakfast) 90 tablet 3     metoprolol succinate ER (TOPROL-XL) 100 MG 24 hr tablet Take 1 tablet (100 mg) by mouth daily 90 tablet 3     sildenafil (REVATIO) 20 MG tablet TAKE 2 TABLETS BY MOUTH DAILY AS NEEDED. NEVER USE WITH NITROGLYCERIN, TERAZOSIN OR DOXAZOSIN 60 tablet 1     order for DME Equipment being ordered: continuous positive airway pressure machine with associated supplies. 1 each 0     No Known Allergies  BP Readings from Last 3 Encounters:   01/30/20 112/72   01/27/20 106/70   11/06/19 131/85    Wt Readings from Last 3 Encounters:   01/30/20 84.4 kg (186 lb 1.6 oz)   01/27/20 83.5 kg (184 lb)   11/06/19 83.9 kg (185 lb)                      Reviewed and updated as needed this visit by Provider         Review of Systems   ROS COMP: CONSTITUTIONAL: NEGATIVE for fever, chills, change in weight  INTEGUMENTARY/SKIN: NEGATIVE for worrisome rashes, moles or lesions  EYES: NEGATIVE for vision changes or irritation  ENT/MOUTH: NEGATIVE for ear, mouth and throat problems  RESP: NEGATIVE for significant cough or SOB  BREAST: NEGATIVE for masses, tenderness or discharge  CV: NEGATIVE for chest pain, palpitations or peripheral edema  GI: NEGATIVE for nausea, abdominal pain, heartburn, or change in bowel habits  : NEGATIVE for frequency, dysuria, or hematuria  MUSCULOSKELETAL: NEGATIVE for significant arthralgias or myalgia  NEURO: NEGATIVE for weakness, dizziness or paresthesias  ENDOCRINE: NEGATIVE for temperature  "intolerance, skin/hair changes  HEME: NEGATIVE for bleeding problems  PSYCHIATRIC: NEGATIVE for changes in mood or affect      Objective    /72 (BP Location: Right arm, Patient Position: Sitting, Cuff Size: Adult Large)   Pulse 51   Temp 98.6  F (37  C) (Tympanic)   Ht 1.638 m (5' 4.5\")   Wt 84.4 kg (186 lb 1.6 oz)   SpO2 95%   BMI 31.45 kg/m    Body mass index is 31.45 kg/m .  Physical Exam   GENERAL: healthy, alert and no distress  EYES: Eyes grossly normal to inspection, PERRL and conjunctivae and sclerae normal  HENT: ear canals and TM's normal, nose and mouth without ulcers or lesions  NECK: no adenopathy, no asymmetry, masses, or scars and thyroid normal to palpation  RESP: lungs clear to auscultation - no rales, rhonchi or wheezes  CV: regular rate and rhythm, normal S1 S2, no S3 or S4, no murmur, click or rub, no peripheral edema and peripheral pulses strong  ABDOMEN: soft, nontender, no hepatosplenomegaly, no masses and bowel sounds normal  MS: no gross musculoskeletal defects noted, no edema  SKIN: no suspicious lesions or rashes  NEURO: Normal strength and tone, mentation intact and speech normal  PSYCH: mentation appears normal, affect normal/bright    Diagnostic Test Results:  Labs reviewed in Epic        Assessment & Plan     1. ROGER (obstructive sleep apnea)  Patient with good results on continuous positive airway pressure, but in need of new device.  New order placed for mask, tubing, and machine.    - CPAP     2.  Inquiring about CT scan for smoking history, but does not meet criteria of packs per day and quit over 20 years ago.     BMI:   Estimated body mass index is 31.45 kg/m  as calculated from the following:    Height as of this encounter: 1.638 m (5' 4.5\").    Weight as of this encounter: 84.4 kg (186 lb 1.6 oz).   Weight management plan: Patient was referred to their PCP to discuss a diet and exercise plan.        There are no Patient Instructions on file for this visit.    No " follow-ups on file.    Kvng Smith MD  [facility]      DME (Durable Medical Equipment) Orders and Documentation  No orders of the defined types were placed in this encounter.     The patient was assessed and it was determined the patient is in need of the following listed DME Supplies/Equipment. Please complete supporting documentation below to demonstrate medical necessity.      DME All Other Item(s) Documentation    List reason for need and supporting documentation for medical necessity below for each DME item.     1. Patient has obstructive sleep apnea.

## 2020-02-03 ENCOUNTER — TELEPHONE (OUTPATIENT)
Dept: PEDIATRICS | Facility: CLINIC | Age: 70
End: 2020-02-03

## 2020-02-03 DIAGNOSIS — G47.33 OSA (OBSTRUCTIVE SLEEP APNEA): ICD-10-CM

## 2020-02-03 NOTE — TELEPHONE ENCOUNTER
Patient did not know his pressure settings.  Please call patient and ask what they are.  Then I can fill that part in.    Kvng Smith MD  Internal Medicine and Pediatrics

## 2020-02-03 NOTE — TELEPHONE ENCOUNTER
C-pap order is missing pressure, please fex new order with pressure on it to  [phone].     Corrine- with [employer] phone [phone]      Marine Reyes on 2/3/2020 at 11:59 AM

## 2020-02-03 NOTE — TELEPHONE ENCOUNTER
Called patient and he is not sure where to look for pressure settings. Machine is 7 years old. He will call company and get back to us with the numbers. Please route back to PCP when we get the information.  Mariella Ambriz LPN

## 2020-02-11 NOTE — TELEPHONE ENCOUNTER
Left message for patient to call back.  What is patient C-PAP pressure setting?      Pat Marino, CMA

## 2020-03-24 DIAGNOSIS — R73.01 IMPAIRED FASTING GLUCOSE: ICD-10-CM

## 2020-03-24 DIAGNOSIS — I25.10 CORONARY ARTERY DISEASE INVOLVING NATIVE CORONARY ARTERY OF NATIVE HEART WITHOUT ANGINA PECTORIS: ICD-10-CM

## 2020-03-24 DIAGNOSIS — E78.2 MIXED HYPERLIPIDEMIA: ICD-10-CM

## 2020-03-24 RX ORDER — EZETIMIBE AND SIMVASTATIN 10; 20 MG/1; MG/1
1 TABLET ORAL AT BEDTIME
Qty: 90 TABLET | Refills: 0 | Status: SHIPPED | OUTPATIENT
Start: 2020-03-24 | End: 2020-06-17

## 2020-03-24 RX ORDER — LISINOPRIL 5 MG/1
5 TABLET ORAL DAILY
Qty: 90 TABLET | Refills: 0 | Status: SHIPPED | OUTPATIENT
Start: 2020-03-24 | End: 2020-06-17

## 2020-03-24 NOTE — TELEPHONE ENCOUNTER
Pending Prescriptions:                       Disp   Refills    ezetimibe-simvastatin (VYTORIN) 10-20 MG *90 tab*1            Sig: Take 1 tablet by mouth At Bedtime    lisinopril (ZESTRIL) 5 MG tablet          90 tab*0            Sig: Take 1 tablet (5 mg) by mouth daily    metFORMIN (GLUCOPHAGE) 500 MG tablet      90 tab*3            Sig: Take 1 tablet (500 mg) by mouth daily (with           breakfast)    Please contact pt if there are further questions/concerns. Pt understands a Med Check may be necessary for refills. Please contact to discuss and advise.    Negrita Coates on 3/24/2020 at 3:13 PM

## 2020-06-16 DIAGNOSIS — I25.10 CORONARY ARTERY DISEASE INVOLVING NATIVE CORONARY ARTERY OF NATIVE HEART WITHOUT ANGINA PECTORIS: ICD-10-CM

## 2020-06-16 DIAGNOSIS — E78.2 MIXED HYPERLIPIDEMIA: ICD-10-CM

## 2020-06-16 DIAGNOSIS — R73.01 IMPAIRED FASTING GLUCOSE: ICD-10-CM

## 2020-06-17 RX ORDER — EZETIMIBE AND SIMVASTATIN 10; 20 MG/1; MG/1
1 TABLET ORAL AT BEDTIME
Qty: 90 TABLET | Refills: 1 | Status: SHIPPED | OUTPATIENT
Start: 2020-06-17 | End: 2020-11-02

## 2020-06-17 RX ORDER — LISINOPRIL 5 MG/1
5 TABLET ORAL DAILY
Qty: 90 TABLET | Refills: 0 | Status: SHIPPED | OUTPATIENT
Start: 2020-06-17 | End: 2020-09-11

## 2020-06-17 NOTE — TELEPHONE ENCOUNTER
Prescription approved per [facility] Refill Protocol.    Luis Alberto DU RN, BSN

## 2020-06-17 NOTE — TELEPHONE ENCOUNTER
Routing refill request to provider for review/approval because:  Labs not current:  LDL, lipid panel, ALT, A1C    Luis Alberto DU RN, BSN

## 2020-06-29 ENCOUNTER — TELEPHONE (OUTPATIENT)
Dept: PEDIATRICS | Facility: CLINIC | Age: 70
End: 2020-06-29

## 2020-06-29 NOTE — TELEPHONE ENCOUNTER
General Call:   Who is calling:  Maddison from [facility]  Reason for Call:  Needs faxed: Reason for CPAP replacement, a copy of the face to face visit, also let them know if he is compliant  What are your questions or concerns:  See above request. Needs this faxed to Maddison at [facility] FAX # [phone]  Date of last appointment with provider: 1/30/20  Okay to leave a detailed message:Yes at Other phone number:  [phone]

## 2020-09-11 DIAGNOSIS — I25.10 CORONARY ARTERY DISEASE INVOLVING NATIVE CORONARY ARTERY OF NATIVE HEART WITHOUT ANGINA PECTORIS: ICD-10-CM

## 2020-09-11 RX ORDER — LISINOPRIL 5 MG/1
5 TABLET ORAL DAILY
Qty: 90 TABLET | Refills: 0 | Status: SHIPPED | OUTPATIENT
Start: 2020-09-11 | End: 2020-11-02

## 2020-09-11 NOTE — TELEPHONE ENCOUNTER
lisinopril (ZESTRIL) 5 MG tablet     Last Written Prescription Date:  6/17/2020  Last Fill Quantity: 90,  # refills: 0   Last office visit: 1/30/2020 with prescribing provider:     Future Office Visit:   Next 5 appointments (look out 90 days)    Nov 02, 2020  7:15 AM CST  (Arrive by 7:00 AM)  Adult Preventative Visit with Kvng Smith MD  [facility] ([facility]) [address] [place] [id number] [phone]             Prescription approved per [facility] Refill Protocol.  Dianne Gary RN

## 2020-10-29 NOTE — PROGRESS NOTES
"  SUBJECTIVE:   Guillermo Ford is a 70 year old male who presents for Preventive Visit.    \  Patient has been advised of split billing requirements and indicates understanding: Yes    Physical Health:  Answers for HPI/ROS submitted by the patient on 11/1/2020   Annual Exam:  In general, how would you rate your overall physical health?: good  Frequency of exercise:: 1 day/week  Do you usually eat at least 4 servings of fruit and vegetables a day, include whole grains & fiber, and avoid regularly eating high fat or \"junk\" foods? : No  Taking medications regularly:: Yes  Medication side effects:: None  Activities of Daily Living: no assistance needed  Home safety: no safety concerns identified  Hearing Impairment:: no hearing concerns  In the past 6 months, have you been bothered by leaking of urine?: No  abdominal pain: No  Blood in stool: No  Blood in urine: No  chest pain: No  chills: No  congestion: No  constipation: No  cough: No  diarrhea: No  dizziness: No  ear pain: No  eye pain: No  nervous/anxious: No  fever: No  frequency: No  genital sores: No  headaches: No  hearing loss: No  heartburn: No  arthralgias: Yes  joint swelling: No  peripheral edema: No  mood changes: No  myalgias: No  nausea: No  dysuria: No  palpitations: No  Skin sensation changes: No  sore throat: No  urgency: No  rash: No  shortness of breath: No  visual disturbance: No  weakness: No  impotence: No  penile discharge: No  In general, how would you rate your overall mental or emotional health?: excellent  Additional concerns today:: No  Duration of exercise:: 15-30 minutes    Mental Health:    In general, how would you rate your overall mental or emotional health? excellent  PHQ-2 Score:      Do you feel safe in your environment? Yes    Have you ever done Advance Care Planning? (For example, a Health Directive, POLST, or a discussion with a medical provider or your loved ones about your wishes): Yes, patient states has an Advance Care " Planning document and will bring a copy to the clinic.    Additional concerns to address?  No    Fall risk:  Fallen 2 or more times in the past year?: (P) No  Any fall with injury in the past year?: (P) No  click delete button to remove this line now  Cognitive Screenin) Repeat 3 items (Leader, Season, Table)    2) Clock draw: ABNORMAL unable to draw hands for 11:10  3) 3 item recall: Recalls 1 object   Results: ABNORMAL clock, 1-2 items recalled: PROBABLE COGNITIVE IMPAIRMENT, **INFORM PROVIDER**    Mini-CogTM Copyright S Salty. Licensed by the author for use in [facility]; reprinted with permission (soob@[facility]). All rights reserved.      Do you have sleep apnea, excessive snoring or daytime drowsiness?: yes        Hyperlipidemia Follow-Up      Are you regularly taking any medication or supplement to lower your cholesterol?   Yes- ezetimibe-simvastatin (VYTORIN) 10-20 MG tablet     Are you having muscle aches or other side effects that you think could be caused by your cholesterol lowering medication?  No    Hypertension Follow-up      Do you check your blood pressure regularly outside of the clinic? No     Are you following a low salt diet? No    Are your blood pressures ever more than 140 on the top number (systolic) OR more   than 90 on the bottom number (diastolic), for example 140/90? No      Reviewed and updated as needed this visit by clinical staff                 Reviewed and updated as needed this visit by Provider                Social History     Tobacco Use     Smoking status: Former Smoker     Quit date: 2000     Years since quittin.5     Smokeless tobacco: Never Used     Tobacco comment: previous 2 ppd smoker   Substance Use Topics     Alcohol use: No     Alcohol/week: 0.0 standard drinks                           Current providers sharing in care for this patient include:   Patient Care Team:  Kvng Smith MD as PCP - General (Internal Medicine)  Kvng Smith MD  as Assigned PCP  Bobby Perez, RN as Personal Advocate & Liaison (PAL)  Patrice Fountain MD as Assigned Heart and Vascular Provider    The following health maintenance items are reviewed in Epic and correct as of today:  Health Maintenance   Topic Date Due     ANNUAL REVIEW OF HM ORDERS  1950     AORTIC ANEURYSM SCREENING (SYSTEM ASSIGNED)  08/07/2017     COLORECTAL CANCER SCREENING  10/15/2019     LIPID  02/08/2020     FALL RISK ASSESSMENT  03/05/2020     MEDICARE ANNUAL WELLNESS VISIT  11/02/2021     ADVANCE CARE PLANNING  11/02/2025     DTAP/TDAP/TD IMMUNIZATION (3 - Td) 03/05/2029     HEPATITIS C SCREENING  Completed     PHQ-2  Completed     INFLUENZA VACCINE  Completed     Pneumococcal Vaccine: 65+ Years  Completed     ZOSTER IMMUNIZATION  Completed     Pneumococcal Vaccine: Pediatrics (0 to 5 Years) and At-Risk Patients (6 to 64 Years)  Aged Out     IPV IMMUNIZATION  Aged Out     MENINGITIS IMMUNIZATION  Aged Out     HEPATITIS B IMMUNIZATION  Aged Out     Lab work is in process  Labs reviewed in EPIC  BP Readings from Last 3 Encounters:   11/02/20 116/68   01/30/20 112/72   01/27/20 106/70    Wt Readings from Last 3 Encounters:   11/02/20 83.7 kg (184 lb 9.6 oz)   01/30/20 84.4 kg (186 lb 1.6 oz)   01/27/20 83.5 kg (184 lb)                  Patient Active Problem List   Diagnosis     Lumbago     Gout     Diverticulitis of colon     Lumbar spinal stenosis     Coronary artery disease involving native coronary artery of native heart without angina pectoris     Mixed hyperlipidemia     Sleep apnea     GERD (gastroesophageal reflux disease)     Impaired fasting glucose     History of acute inferior wall MI     Left ventricular diastolic dysfunction     ACP (advance care planning)     Benign essential hypertension     Past Surgical History:   Procedure Laterality Date     HC CORONARY STENT PERCUT, INITIAL VESSEL  4/18/2005    PTCA with intracoronary stent placement of, distal RCA and ostial PDA     HC REPAIR  OF NASAL SEPTUM      Septoplasty and uvulectomy       Social History     Tobacco Use     Smoking status: Former Smoker     Quit date: 2000     Years since quittin.5     Smokeless tobacco: Never Used     Tobacco comment: previous 2 ppd smoker   Substance Use Topics     Alcohol use: No     Alcohol/week: 0.0 standard drinks     Family History   Problem Relation Age of Onset     C.A.D. Mother      C.A.D. Father      Diabetes Brother         unsure of type 1/2     Family History Negative Sister      Family History Negative Son      Family History Negative Daughter      Family History Negative Brother      Cancer - colorectal No family hx of      Prostate Cancer No family hx of          Current Outpatient Medications   Medication Sig Dispense Refill     aspirin (ASA) 81 MG tablet Take 1 tablet (81 mg) by mouth daily 90 tablet 3     ezetimibe-simvastatin (VYTORIN) 10-20 MG tablet Take 1 tablet by mouth At Bedtime 90 tablet 1     lisinopril (ZESTRIL) 5 MG tablet Take 1 tablet (5 mg) by mouth daily 90 tablet 0     metFORMIN (GLUCOPHAGE) 500 MG tablet Take 1 tablet (500 mg) by mouth daily (with breakfast) 90 tablet 1     metoprolol succinate ER (TOPROL-XL) 100 MG 24 hr tablet Take 1 tablet (100 mg) by mouth daily 90 tablet 3     sildenafil (REVATIO) 20 MG tablet TAKE 2 TABLETS BY MOUTH DAILY AS NEEDED. NEVER USE WITH NITROGLYCERIN, TERAZOSIN OR DOXAZOSIN 60 tablet 1     order for DME Equipment being ordered: continuous positive airway pressure machine with associated supplies.    Pressure settin = 8 1 each 0     No Known Allergies      ROS:  CONSTITUTIONAL: NEGATIVE for fever, chills, change in weight  INTEGUMENTARY/SKIN: NEGATIVE for worrisome rashes, moles or lesions  EYES: NEGATIVE for vision changes or irritation  ENT/MOUTH: NEGATIVE for ear, mouth and throat problems  RESP: NEGATIVE for significant cough or SOB  BREAST: NEGATIVE for masses, tenderness or discharge  CV: NEGATIVE for chest pain, palpitations or  "peripheral edema  GI: NEGATIVE for nausea, abdominal pain, heartburn, or change in bowel habits  : NEGATIVE for frequency, dysuria, or hematuria  MUSCULOSKELETAL: NEGATIVE for significant arthralgias or myalgia  NEURO: NEGATIVE for weakness, dizziness or paresthesias  ENDOCRINE: NEGATIVE for temperature intolerance, skin/hair changes  HEME: NEGATIVE for bleeding problems  PSYCHIATRIC: NEGATIVE for changes in mood or affect    OBJECTIVE:   There were no vitals taken for this visit. Estimated body mass index is 31.45 kg/m  as calculated from the following:    Height as of 1/30/20: 1.638 m (5' 4.5\").    Weight as of 1/30/20: 84.4 kg (186 lb 1.6 oz).  EXAM:   GENERAL: healthy, alert and no distress  EYES: Eyes grossly normal to inspection, PERRL and conjunctivae and sclerae normal  HENT: ear canals and TM's normal, nose and mouth without ulcers or lesions  NECK: no adenopathy, no asymmetry, masses, or scars and thyroid normal to palpation  RESP: lungs clear to auscultation - no rales, rhonchi or wheezes  CV: regular rate and rhythm, normal S1 S2, no S3 or S4, no murmur, click or rub, no peripheral edema and peripheral pulses strong  ABDOMEN: soft, nontender, no hepatosplenomegaly, no masses and bowel sounds normal  MS: no gross musculoskeletal defects noted, no edema  SKIN: no suspicious lesions or rashes  NEURO: Normal strength and tone, mentation intact and speech normal  PSYCH: mentation appears normal, affect normal/bright    Diagnostic Test Results:  Labs reviewed in Epic    ASSESSMENT / PLAN:   1. Routine general medical examination at a health care facility  Discussed diet, exercise, testicular self exam, blood pressure, cholesterol, and need for cancer surveillance at appropriate ages.   - Lipid panel reflex to direct LDL Fasting  - Comprehensive metabolic panel (BMP + Alb, Alk Phos, ALT, AST, Total. Bili, TP)  - Hemoglobin A1c    2. Special screening for malignant neoplasms, colon    - GASTROENTEROLOGY ADULT " "REF PROCEDURE ONLY; Future    3. Screening for AAA (abdominal aortic aneurysm)  Due.   -  Aorta Medicare AAA Screening; Future    4. Mixed hyperlipidemia  Continue vytorin.   - ezetimibe-simvastatin (VYTORIN) 10-20 MG tablet; Take 1 tablet by mouth At Bedtime  Dispense: 90 tablet; Refill: 3    5. Coronary artery disease involving native coronary artery of native heart without angina pectoris  Secondary risk factor modification.    - lisinopril (ZESTRIL) 5 MG tablet; Take 1 tablet (5 mg) by mouth daily  Dispense: 90 tablet; Refill: 3  - metoprolol succinate ER (TOPROL-XL) 100 MG 24 hr tablet; Take 1 tablet (100 mg) by mouth daily  Dispense: 90 tablet; Refill: 3    6. Impaired fasting glucose  Never diagnosed with diabetes mellitus; secondary risk factor modification.   - metFORMIN (GLUCOPHAGE) 500 MG tablet; Take 1 tablet (500 mg) by mouth daily (with breakfast)  Dispense: 90 tablet; Refill: 3    7. Erectile dysfunction, unspecified erectile dysfunction type  Continue with revatio.   - sildenafil (REVATIO) 20 MG tablet; TAKE 2 TABLETS BY MOUTH DAILY AS NEEDED. NEVER USE WITH NITROGLYCERIN, TERAZOSIN OR DOXAZOSIN  Dispense: 60 tablet; Refill: 1    8. Encounter for Medicare annual wellness exam  Discussed diet, exercise, testicular self exam, blood pressure, cholesterol, and need for cancer surveillance at appropriate ages.       Patient has been advised of split billing requirements and indicates understanding: Yes    COUNSELING:  Reviewed preventive health counseling, as reflected in patient instructions       Regular exercise       Healthy diet/nutrition       Vision screening       Hearing screening       Dental care       Bladder control       Colon cancer screening       Prostate cancer screening    Estimated body mass index is 31.45 kg/m  as calculated from the following:    Height as of 1/30/20: 1.638 m (5' 4.5\").    Weight as of 1/30/20: 84.4 kg (186 lb 1.6 oz).        He reports that he quit smoking about " 20 years ago. He has never used smokeless tobacco.    Appropriate preventive services were discussed with this patient, including applicable screening as appropriate for cardiovascular disease, diabetes, osteopenia/osteoporosis, and glaucoma.  As appropriate for age/gender, discussed screening for colorectal cancer, prostate cancer, breast cancer, and cervical cancer. Checklist reviewing preventive services available has been given to the patient.    Reviewed patients plan of care and provided an AVS. The Basic Care Plan (routine screening as documented in Health Maintenance) for Guillermo meets the Care Plan requirement. This Care Plan has been established and reviewed with the Patient.    Counseling Resources:  ATP IV Guidelines  Pooled Cohorts Equation Calculator  Breast Cancer Risk Calculator  BRCA-Related Cancer Risk Assessment: FHS-7 Tool  FRAX Risk Assessment  ICSI Preventive Guidelines  Dietary Guidelines for Americans, 2010  USDA's MyPlate  ASA Prophylaxis  Lung CA Screening    Kvng Smith MD  [facility]

## 2020-10-29 NOTE — PATIENT INSTRUCTIONS
"Colonoscopy: you can call [phone] to set up your colonoscopy.  If they have not heard from you, they may call you directly.    Cardio exercise is probably the most helpful thing for your heart and future health.  Try to get about 30 minutes of sustained cardio exercise (biking, running, swimming, fast walking) every other day or even every day.  Keeping your heart rate at a \"target\" of (220 - your age) x 0.80 will be your goal.  For example, if you're 60 years old, this would be (220 - 60) x 0.80 = 128 beats per minute for those 30 minutes of exercise.    Lab work downstairs today.  Directions:  As you walk through the first floor, you'll see (on the right) first the pharmacy, then some bathrooms, then the \"Lab and Imaging\" area. Give them your name at the window there and wait for them to call you.    Goal weight:  170.      Preventive Health Recommendations:     See your health care provider every year to    Review health changes.     Discuss preventive care.      Review your medicines if your doctor has prescribed any.      Talk with your health care provider about whether you should have a test to screen for prostate cancer (PSA).    Every 3 years, have a diabetes test (fasting glucose). If you are at risk for diabetes, you should have this test more often.    Every 5 years, have a cholesterol test. Have this test more often if you are at risk for high cholesterol or heart disease.     Every 10 years, have a colonoscopy. Or, have a yearly FIT test (stool test). These exams will check for colon cancer.    Talk to with your health care provider about screening for Abdominal Aortic Aneurysm if you have a family history of AAA or have a history of smoking.    Shots:     Get a flu shot each year.     Get a tetanus shot every 10 years.     Talk to your doctor about your pneumonia vaccines. There are now two you should receive - Pneumovax (PPSV 23) and Prevnar (PCV 13).     Talk to your pharmacist about a shingles " vaccine.     Talk to your doctor about the hepatitis B vaccine.  Nutrition:     Eat at least 5 servings of fruits and vegetables each day.     Eat whole-grain bread, whole-wheat pasta and brown rice instead of white grains and rice.     Get adequate Calcium and Vitamin D.   Lifestyle    Exercise for at least 150 minutes a week (30 minutes a day, 5 days a week). This will help you control your weight and prevent disease.     Limit alcohol to one drink per day.     No smoking.     Wear sunscreen to prevent skin cancer.    See your dentist every six months for an exam and cleaning.    See your eye doctor every 1 to 2 years to screen for conditions such as glaucoma, macular degeneration, cataracts, etc.    Personalized Prevention Plan  You are due for the preventive services outlined below.  Your care team is available to assist you in scheduling these services.  If you have already completed any of these items, please share that information with your care team to update in your medical record.  Health Maintenance Due   Topic Date Due     ANNUAL REVIEW OF HM ORDERS  1950     AORTIC ANEURYSM SCREENING (SYSTEM ASSIGNED)  08/07/2017     Colorectal Cancer Screening  10/15/2019     PHQ-2  01/01/2020     Cholesterol Lab  02/08/2020     FALL RISK ASSESSMENT  03/05/2020     Flu Vaccine (1) 09/01/2020     Patient Education   Personalized Prevention Plan  You are due for the preventive services outlined below.  Your care team is available to assist you in scheduling these services.  If you have already completed any of these items, please share that information with your care team to update in your medical record.  Health Maintenance Due   Topic Date Due     AORTIC ANEURYSM SCREENING (SYSTEM ASSIGNED)  08/07/2017     Colorectal Cancer Screening  10/15/2019     Cholesterol Lab  02/08/2020

## 2020-11-01 ASSESSMENT — ENCOUNTER SYMPTOMS
HEMATURIA: 0
JOINT SWELLING: 0
CONSTIPATION: 0
NAUSEA: 0
WEAKNESS: 0
HEARTBURN: 0
HEMATOCHEZIA: 0
FREQUENCY: 0
DIARRHEA: 0
EYE PAIN: 0
DYSURIA: 0
NERVOUS/ANXIOUS: 0
DIZZINESS: 0
PARESTHESIAS: 0
CHILLS: 0
HEADACHES: 0
PALPITATIONS: 0
SHORTNESS OF BREATH: 0
ABDOMINAL PAIN: 0
FEVER: 0
MYALGIAS: 0
SORE THROAT: 0
COUGH: 0
ARTHRALGIAS: 1

## 2020-11-01 ASSESSMENT — ACTIVITIES OF DAILY LIVING (ADL): CURRENT_FUNCTION: NO ASSISTANCE NEEDED

## 2020-11-02 ENCOUNTER — OFFICE VISIT (OUTPATIENT)
Dept: PEDIATRICS | Facility: CLINIC | Age: 70
End: 2020-11-02
Payer: COMMERCIAL

## 2020-11-02 VITALS
HEIGHT: 64 IN | WEIGHT: 184.6 LBS | OXYGEN SATURATION: 95 % | HEART RATE: 52 BPM | TEMPERATURE: 98.3 F | DIASTOLIC BLOOD PRESSURE: 68 MMHG | BODY MASS INDEX: 31.51 KG/M2 | SYSTOLIC BLOOD PRESSURE: 116 MMHG | RESPIRATION RATE: 16 BRPM

## 2020-11-02 DIAGNOSIS — R73.01 IMPAIRED FASTING GLUCOSE: ICD-10-CM

## 2020-11-02 DIAGNOSIS — Z00.00 ENCOUNTER FOR MEDICARE ANNUAL WELLNESS EXAM: ICD-10-CM

## 2020-11-02 DIAGNOSIS — Z12.11 SPECIAL SCREENING FOR MALIGNANT NEOPLASMS, COLON: ICD-10-CM

## 2020-11-02 DIAGNOSIS — E78.2 MIXED HYPERLIPIDEMIA: ICD-10-CM

## 2020-11-02 DIAGNOSIS — Z00.00 ROUTINE GENERAL MEDICAL EXAMINATION AT A HEALTH CARE FACILITY: Primary | ICD-10-CM

## 2020-11-02 DIAGNOSIS — Z13.6 SCREENING FOR AAA (ABDOMINAL AORTIC ANEURYSM): ICD-10-CM

## 2020-11-02 DIAGNOSIS — I25.10 CORONARY ARTERY DISEASE INVOLVING NATIVE CORONARY ARTERY OF NATIVE HEART WITHOUT ANGINA PECTORIS: ICD-10-CM

## 2020-11-02 DIAGNOSIS — N52.9 ERECTILE DYSFUNCTION, UNSPECIFIED ERECTILE DYSFUNCTION TYPE: ICD-10-CM

## 2020-11-02 LAB
ALBUMIN SERPL-MCNC: 3.5 G/DL (ref 3.4–5)
ALP SERPL-CCNC: 60 U/L (ref 40–150)
ALT SERPL W P-5'-P-CCNC: 26 U/L (ref 0–70)
ANION GAP SERPL CALCULATED.3IONS-SCNC: 3 MMOL/L (ref 3–14)
AST SERPL W P-5'-P-CCNC: 16 U/L (ref 0–45)
BILIRUB SERPL-MCNC: 0.5 MG/DL (ref 0.2–1.3)
BUN SERPL-MCNC: 11 MG/DL (ref 7–30)
CALCIUM SERPL-MCNC: 9.5 MG/DL (ref 8.5–10.1)
CHLORIDE SERPL-SCNC: 106 MMOL/L (ref 94–109)
CHOLEST SERPL-MCNC: 157 MG/DL
CO2 SERPL-SCNC: 27 MMOL/L (ref 20–32)
CREAT SERPL-MCNC: 0.94 MG/DL (ref 0.66–1.25)
GFR SERPL CREATININE-BSD FRML MDRD: 82 ML/MIN/{1.73_M2}
GLUCOSE SERPL-MCNC: 127 MG/DL (ref 70–99)
HBA1C MFR BLD: 6.5 % (ref 0–5.6)
HDLC SERPL-MCNC: 63 MG/DL
LDLC SERPL CALC-MCNC: 66 MG/DL
NONHDLC SERPL-MCNC: 94 MG/DL
POTASSIUM SERPL-SCNC: 4 MMOL/L (ref 3.4–5.3)
PROT SERPL-MCNC: 7 G/DL (ref 6.8–8.8)
SODIUM SERPL-SCNC: 136 MMOL/L (ref 133–144)
TRIGL SERPL-MCNC: 139 MG/DL

## 2020-11-02 PROCEDURE — 99397 PER PM REEVAL EST PAT 65+ YR: CPT | Performed by: INTERNAL MEDICINE

## 2020-11-02 PROCEDURE — 80061 LIPID PANEL: CPT | Performed by: INTERNAL MEDICINE

## 2020-11-02 PROCEDURE — 83036 HEMOGLOBIN GLYCOSYLATED A1C: CPT | Performed by: INTERNAL MEDICINE

## 2020-11-02 PROCEDURE — 36415 COLL VENOUS BLD VENIPUNCTURE: CPT | Performed by: INTERNAL MEDICINE

## 2020-11-02 PROCEDURE — 80053 COMPREHEN METABOLIC PANEL: CPT | Performed by: INTERNAL MEDICINE

## 2020-11-02 RX ORDER — EZETIMIBE AND SIMVASTATIN 10; 20 MG/1; MG/1
1 TABLET ORAL AT BEDTIME
Qty: 90 TABLET | Refills: 3 | Status: SHIPPED | OUTPATIENT
Start: 2020-11-02 | End: 2022-01-10

## 2020-11-02 RX ORDER — SILDENAFIL CITRATE 20 MG/1
TABLET ORAL
Qty: 60 TABLET | Refills: 1 | Status: SHIPPED | OUTPATIENT
Start: 2020-11-02 | End: 2021-09-20

## 2020-11-02 RX ORDER — LISINOPRIL 5 MG/1
5 TABLET ORAL DAILY
Qty: 90 TABLET | Refills: 3 | Status: SHIPPED | OUTPATIENT
Start: 2020-11-02 | End: 2021-12-30

## 2020-11-02 RX ORDER — METOPROLOL SUCCINATE 100 MG/1
100 TABLET, EXTENDED RELEASE ORAL DAILY
Qty: 90 TABLET | Refills: 3 | Status: SHIPPED | OUTPATIENT
Start: 2020-11-02 | End: 2021-12-30

## 2020-11-02 ASSESSMENT — MIFFLIN-ST. JEOR: SCORE: 1523.59

## 2020-11-06 ENCOUNTER — HOSPITAL ENCOUNTER (OUTPATIENT)
Facility: CLINIC | Age: 70
End: 2020-11-06
Attending: INTERNAL MEDICINE | Admitting: INTERNAL MEDICINE
Payer: COMMERCIAL

## 2020-11-16 DIAGNOSIS — Z11.59 ENCOUNTER FOR SCREENING FOR OTHER VIRAL DISEASES: Primary | ICD-10-CM

## 2020-11-17 ENCOUNTER — HOSPITAL ENCOUNTER (OUTPATIENT)
Dept: ULTRASOUND IMAGING | Facility: CLINIC | Age: 70
Discharge: HOME | End: 2020-11-17
Attending: INTERNAL MEDICINE | Admitting: INTERNAL MEDICINE
Payer: COMMERCIAL

## 2020-11-17 DIAGNOSIS — Z13.6 SCREENING FOR AAA (ABDOMINAL AORTIC ANEURYSM): ICD-10-CM

## 2020-11-17 PROCEDURE — 76706 US ABDL AORTA SCREEN AAA: CPT

## 2021-01-06 RX ORDER — LIDOCAINE 40 MG/G
CREAM TOPICAL
Status: CANCELLED | OUTPATIENT
Start: 2021-01-06

## 2021-01-06 RX ORDER — ONDANSETRON 2 MG/ML
4 INJECTION INTRAMUSCULAR; INTRAVENOUS
Status: CANCELLED | OUTPATIENT
Start: 2021-01-06

## 2021-01-19 ENCOUNTER — OFFICE VISIT (OUTPATIENT)
Dept: LAB | Facility: CLINIC | Age: 71
End: 2021-01-19
Attending: INTERNAL MEDICINE
Payer: COMMERCIAL

## 2021-01-19 DIAGNOSIS — Z11.59 ENCOUNTER FOR SCREENING FOR OTHER VIRAL DISEASES: ICD-10-CM

## 2021-01-19 LAB
LABORATORY COMMENT REPORT: NORMAL
SARS-COV-2 RNA RESP QL NAA+PROBE: NEGATIVE
SARS-COV-2 RNA RESP QL NAA+PROBE: NORMAL
SPECIMEN SOURCE: NORMAL
SPECIMEN SOURCE: NORMAL

## 2021-01-19 PROCEDURE — U0005 INFEC AGEN DETEC AMPLI PROBE: HCPCS | Performed by: INTERNAL MEDICINE

## 2021-01-19 PROCEDURE — U0003 INFECTIOUS AGENT DETECTION BY NUCLEIC ACID (DNA OR RNA); SEVERE ACUTE RESPIRATORY SYNDROME CORONAVIRUS 2 (SARS-COV-2) (CORONAVIRUS DISEASE [COVID-19]), AMPLIFIED PROBE TECHNIQUE, MAKING USE OF HIGH THROUGHPUT TECHNOLOGIES AS DESCRIBED BY CMS-2020-01-R: HCPCS | Performed by: INTERNAL MEDICINE

## 2021-03-29 ENCOUNTER — TELEPHONE (OUTPATIENT)
Dept: PEDIATRICS | Facility: CLINIC | Age: 71
End: 2021-03-29

## 2021-03-29 DIAGNOSIS — R73.01 IMPAIRED FASTING GLUCOSE: Primary | ICD-10-CM

## 2021-03-29 NOTE — TELEPHONE ENCOUNTER
Halima, calling from [facility]  home delivery pharmacy.     The patient needs a 30 day prescription of Metformin sent to [facility] pharmacy in [place] on [address].     The existing order for metformin written on 11/2/20 has refills remaining, and will be arriving to the patients home in the near future.     The problem arose when the patient declined the option for automatic refills in the past. So the patient was waiting for this medication which was not sent because the patient had not requested it.   Now the patient is nearly out of the medication, and the [facility] home deliver pharmacy has been contacted by the patient to send the prescription, but it will not arrive before the patient is out of the medication.     Routing to Dr. Smith and covering provider to advise a 30 day prescription of metformin to send to [facility] pharmacy in [place] on [address]. This can be picked up by the patient before running out of the med to last until the home delivery comes.    Bobby Perez RN on 3/29/2021 at 10:28 AM

## 2021-04-23 DIAGNOSIS — R73.01 IMPAIRED FASTING GLUCOSE: ICD-10-CM

## 2021-04-23 DIAGNOSIS — I25.10 CAD (CORONARY ARTERY DISEASE): Primary | ICD-10-CM

## 2021-05-18 ENCOUNTER — HOSPITAL ENCOUNTER (OUTPATIENT)
Dept: CARDIOLOGY | Facility: CLINIC | Age: 71
Discharge: HOME | End: 2021-05-18
Attending: INTERNAL MEDICINE | Admitting: INTERNAL MEDICINE
Payer: COMMERCIAL

## 2021-05-18 DIAGNOSIS — I25.10 CAD (CORONARY ARTERY DISEASE): ICD-10-CM

## 2021-05-18 LAB
ALT SERPL W P-5'-P-CCNC: 31 U/L (ref 0–70)
CHOLEST SERPL-MCNC: 145 MG/DL
HDLC SERPL-MCNC: 56 MG/DL
LDLC SERPL CALC-MCNC: 56 MG/DL
NONHDLC SERPL-MCNC: 89 MG/DL
TRIGL SERPL-MCNC: 165 MG/DL

## 2021-05-18 PROCEDURE — 36415 COLL VENOUS BLD VENIPUNCTURE: CPT | Performed by: INTERNAL MEDICINE

## 2021-05-18 PROCEDURE — 93306 TTE W/DOPPLER COMPLETE: CPT | Mod: 26 | Performed by: INTERNAL MEDICINE

## 2021-05-18 PROCEDURE — 84460 ALANINE AMINO (ALT) (SGPT): CPT | Performed by: INTERNAL MEDICINE

## 2021-05-18 PROCEDURE — 93306 TTE W/DOPPLER COMPLETE: CPT

## 2021-05-18 PROCEDURE — 80061 LIPID PANEL: CPT | Performed by: INTERNAL MEDICINE

## 2021-06-18 ENCOUNTER — OFFICE VISIT (OUTPATIENT)
Dept: CARDIOLOGY | Facility: CLINIC | Age: 71
End: 2021-06-18
Attending: INTERNAL MEDICINE
Payer: COMMERCIAL

## 2021-06-18 VITALS
HEIGHT: 65 IN | BODY MASS INDEX: 31.32 KG/M2 | SYSTOLIC BLOOD PRESSURE: 160 MMHG | HEART RATE: 48 BPM | WEIGHT: 188 LBS | DIASTOLIC BLOOD PRESSURE: 85 MMHG

## 2021-06-18 DIAGNOSIS — I25.10 CORONARY ARTERY DISEASE INVOLVING NATIVE HEART WITHOUT ANGINA PECTORIS, UNSPECIFIED VESSEL OR LESION TYPE: ICD-10-CM

## 2021-06-18 PROBLEM — E11.9 DIABETES MELLITUS, TYPE 2 (H): Status: ACTIVE | Noted: 2021-06-18

## 2021-06-18 PROCEDURE — 99214 OFFICE O/P EST MOD 30 MIN: CPT | Performed by: INTERNAL MEDICINE

## 2021-06-18 ASSESSMENT — MIFFLIN-ST. JEOR: SCORE: 1541.7

## 2021-06-18 NOTE — LETTER
"6/18/2021    Kvng Smith MD  [address] [place] [address]    RE: Guillermo Ford       Dear Colleague,    I had the pleasure of seeing Guillermo Ford in the [facility].    CARDIOLOGY CLINIC CONSULTATION    REASON FOR CONSULT: Stable CAD    PRIMARY CARE PHYSICIAN:  Kvng Smith    HISTORY OF PRESENT ILLNESS:  Is a very pleasant 70-year-old gentleman who first saw me in January 2020 after he transferred care to me from some of my partners.  He has the following cardiac pertinent medical issues    1. Stable coronary artery disease with inferior wall myocardial infarction status post percutaneous coronary intervention in 2005 with future nuclear studies showing no significant ischemia  2. Obesity  3. Hypertension  4. Diabetes  5. Sleep apnea  6. Hypertriglyceridemia    Guillermo is here today for routine follow-up.  He denies any cardiovascular symptoms.  He is functionally very active.  No chest pain syncope presyncope.  No shortness of breath edema orthopnea.  He can easily walk many blocks without any functional limitations.    Last time when he had seen me we had switched him from Plavix monotherapy to aspirin monotherapy given his bleeding and easy bruisability.  Now his bleeding is better.  He has a blood pressure machine at home but does not check it.    PAST MEDICAL HISTORY:  Past Medical History:   Diagnosis Date     Alcohol Dependence in Remission     sober since 1996     CAD (coronary artery disease)     cardiac cath 2005: stent to PDA     Diverticulitis of colon 6/9/2008    Diagnosed after diarrheal episode from colchicine; \"Probable\" on CT scan 6/08.     GERD (gastroesophageal reflux disease)      Gout      History of acute inferior wall MI     2005     Left ventricular diastolic dysfunction      Mixed hyperlipidemia      Sleep apnea     C PAP       MEDICATIONS:  Current Outpatient Medications   Medication     aspirin (ASA) 81 MG tablet "     ezetimibe-simvastatin (VYTORIN) 10-20 MG tablet     lisinopril (ZESTRIL) 5 MG tablet     metFORMIN (GLUCOPHAGE) 500 MG tablet     metoprolol succinate ER (TOPROL-XL) 100 MG 24 hr tablet     order for DME     sildenafil (REVATIO) 20 MG tablet     No current facility-administered medications for this visit.        ALLERGIES:  No Known Allergies    SOCIAL HISTORY:  I have reviewed this patient's social history and updated it with pertinent information if needed. Guillermo Sainielizabeth  reports that he quit smoking about 21 years ago. He has never used smokeless tobacco. He reports that he does not drink alcohol or use drugs.    FAMILY HISTORY:  I have reviewed this patient's family history and updated it with pertinent information if needed.   Family History   Problem Relation Age of Onset     C.A.D. Mother      C.A.D. Father      Diabetes Brother         unsure of type 1/2     Family History Negative Sister      Family History Negative Son      Family History Negative Daughter      Family History Negative Brother      Cancer - colorectal No family hx of      Prostate Cancer No family hx of        REVIEW OF SYSTEMS:  Skin:  Positive for bruising   Eyes:  Negative    ENT:  Negative    Respiratory:  Positive for sleep apnea;CPAP  Cardiovascular:  Negative    Gastroenterology: Negative    Genitourinary:  not assessed    Musculoskeletal:  Negative    Neurologic:  Negative    Psychiatric:  Negative    Heme/Lymph/Imm:  Negative    Endocrine:  Negative        PHYSICAL EXAM:      BP: (!) 160/85 Pulse: (!) 48            Vital Signs with Ranges  Pulse:  [48] 48  BP: (160)/(85) 160/85  188 lbs 0 oz    Constitutional: awake, alert, no distress  Respiratory: Clear  Cardiovascular: Normal heart sounds normal JVP no edema  GI: nondistended, obese  Neuropsychiatric: appropriate affact    DATA:  Labs: Pertinent cardiac labs reviewed    Echocardiogram: May 2021 normal LV function normal RV function no major valve  disease    ASSESSMENT:  Stable coronary disease without angina    RECOMMENDATIONS:  1. Guillermo is doing well from a cardiac standpoint without any symptoms.  At this time I have not made any changes to his cardiac medical regimen.  2. I advised him to continue exercising and eat healthy lifestyle with better diet and weight loss.  3. Today in clinic initial blood pressure was elevated at 160/85.  Recheck was 148/78 mm Hg.  I told him to start monitoring his blood pressure at home and follow-up with a nurse practitioner for blood pressure follow-up in 1 month.  Heart rate is in the 50s.  If he remains hypertensive on home blood pressure readings, I recommend increasing lisinopril.  4. Otherwise he will see me routinely in clinic in 1 year.  He has been told to contact us immediately with any clinical changes or symptoms.    KIRAN Zimmerman, [facility]  Cardiology - [facility]  June 18, 2021    cc:   Patrice Fountain MD  [address]  [place] [id number]

## 2021-06-18 NOTE — PROGRESS NOTES
"CARDIOLOGY CLINIC CONSULTATION    REASON FOR CONSULT: Stable CAD    PRIMARY CARE PHYSICIAN:  Kvng Smith    HISTORY OF PRESENT ILLNESS:  Is a very pleasant 70-year-old gentleman who first saw me in January 2020 after he transferred care to me from some of my partners.  He has the following cardiac pertinent medical issues    1. Stable coronary artery disease with inferior wall myocardial infarction status post percutaneous coronary intervention in 2005 with future nuclear studies showing no significant ischemia  2. Obesity  3. Hypertension  4. Diabetes  5. Sleep apnea  6. Hypertriglyceridemia    Guillermo is here today for routine follow-up.  He denies any cardiovascular symptoms.  He is functionally very active.  No chest pain syncope presyncope.  No shortness of breath edema orthopnea.  He can easily walk many blocks without any functional limitations.    Last time when he had seen me we had switched him from Plavix monotherapy to aspirin monotherapy given his bleeding and easy bruisability.  Now his bleeding is better.  He has a blood pressure machine at home but does not check it.    PAST MEDICAL HISTORY:  Past Medical History:   Diagnosis Date     Alcohol Dependence in Remission     sober since 1996     CAD (coronary artery disease)     cardiac cath 2005: stent to PDA     Diverticulitis of colon 6/9/2008    Diagnosed after diarrheal episode from colchicine; \"Probable\" on CT scan 6/08.     GERD (gastroesophageal reflux disease)      Gout      History of acute inferior wall MI     2005     Left ventricular diastolic dysfunction      Mixed hyperlipidemia      Sleep apnea     C PAP       MEDICATIONS:  Current Outpatient Medications   Medication     aspirin (ASA) 81 MG tablet     ezetimibe-simvastatin (VYTORIN) 10-20 MG tablet     lisinopril (ZESTRIL) 5 MG tablet     metFORMIN (GLUCOPHAGE) 500 MG tablet     metoprolol succinate ER (TOPROL-XL) 100 MG 24 hr tablet     order for DME     sildenafil (REVATIO) 20 MG tablet "     No current facility-administered medications for this visit.        ALLERGIES:  No Known Allergies    SOCIAL HISTORY:  I have reviewed this patient's social history and updated it with pertinent information if needed. Guillermo Ford  reports that he quit smoking about 21 years ago. He has never used smokeless tobacco. He reports that he does not drink alcohol or use drugs.    FAMILY HISTORY:  I have reviewed this patient's family history and updated it with pertinent information if needed.   Family History   Problem Relation Age of Onset     C.A.D. Mother      C.A.D. Father      Diabetes Brother         unsure of type 1/2     Family History Negative Sister      Family History Negative Son      Family History Negative Daughter      Family History Negative Brother      Cancer - colorectal No family hx of      Prostate Cancer No family hx of        REVIEW OF SYSTEMS:  Skin:  Positive for bruising   Eyes:  Negative    ENT:  Negative    Respiratory:  Positive for sleep apnea;CPAP  Cardiovascular:  Negative    Gastroenterology: Negative    Genitourinary:  not assessed    Musculoskeletal:  Negative    Neurologic:  Negative    Psychiatric:  Negative    Heme/Lymph/Imm:  Negative    Endocrine:  Negative        PHYSICAL EXAM:      BP: (!) 160/85 Pulse: (!) 48            Vital Signs with Ranges  Pulse:  [48] 48  BP: (160)/(85) 160/85  188 lbs 0 oz    Constitutional: awake, alert, no distress  Respiratory: Clear  Cardiovascular: Normal heart sounds normal JVP no edema  GI: nondistended, obese  Neuropsychiatric: appropriate affact    DATA:  Labs: Pertinent cardiac labs reviewed    Echocardiogram: May 2021 normal LV function normal RV function no major valve disease    ASSESSMENT:  Stable coronary disease without angina    RECOMMENDATIONS:  1. Guillermo is doing well from a cardiac standpoint without any symptoms.  At this time I have not made any changes to his cardiac medical regimen.  2. I advised him to continue exercising and  eat healthy lifestyle with better diet and weight loss.  3. Today in clinic initial blood pressure was elevated at 160/85.  Recheck was 148/78 mm Hg.  I told him to start monitoring his blood pressure at home and follow-up with a nurse practitioner for blood pressure follow-up in 1 month.  Heart rate is in the 50s.  If he remains hypertensive on home blood pressure readings, I recommend increasing lisinopril.  4. Otherwise he will see me routinely in clinic in 1 year.  He has been told to contact us immediately with any clinical changes or symptoms.    KIRAN Zimmerman, [facility]  Cardiology - [facility]  June 18, 2021

## 2021-07-04 ENCOUNTER — HEALTH MAINTENANCE LETTER (OUTPATIENT)
Age: 71
End: 2021-07-04

## 2021-07-21 ENCOUNTER — OFFICE VISIT (OUTPATIENT)
Dept: CARDIOLOGY | Facility: CLINIC | Age: 71
End: 2021-07-21
Attending: INTERNAL MEDICINE
Payer: COMMERCIAL

## 2021-07-21 VITALS
HEIGHT: 65 IN | SYSTOLIC BLOOD PRESSURE: 110 MMHG | BODY MASS INDEX: 31.24 KG/M2 | DIASTOLIC BLOOD PRESSURE: 62 MMHG | HEART RATE: 49 BPM | WEIGHT: 187.5 LBS | OXYGEN SATURATION: 96 %

## 2021-07-21 DIAGNOSIS — I10 ESSENTIAL HYPERTENSION: Primary | ICD-10-CM

## 2021-07-21 DIAGNOSIS — I25.10 CORONARY ARTERY DISEASE INVOLVING NATIVE CORONARY ARTERY OF NATIVE HEART WITHOUT ANGINA PECTORIS: ICD-10-CM

## 2021-07-21 PROCEDURE — 99214 OFFICE O/P EST MOD 30 MIN: CPT | Performed by: PHYSICIAN ASSISTANT

## 2021-07-21 ASSESSMENT — MIFFLIN-ST. JEOR: SCORE: 1539.43

## 2021-07-21 NOTE — PROGRESS NOTES
SERVICE DATE: 7/21/2021     Primary Cardiologist: Dr Fountain     REASON FOR VISIT:  Guillermo Ford presents for HTN follow up    HISTORY OF PRESENT ILLNESS/ASSESSMENT AND PLAN: :  Cardiovascular history includes HTN, HL and CAD with a prior inferior wall MI in 2005. He was found to have a severe stenosis in the distal right coronary artery involving the PDA, treated with stent placement in the RCA along with angioplasty of the ostium of the PDA.  His EF was reduced to 35%-40% around that time.  Fortunately with revascularization, his EF normalized.  His last nuclear stress test in early 2015 showed a small area of infarction in the inferior wall without any vicenta-infarct ischemia. He also has DM and ROGER. He was recently in to see Dr Fountain for his annual visit and was doing well but noted to have high BP.      Continues to feel well, no CV complaints. BP is being monitored daily at home SBP 100s-low 130s (mostly under 130) and DBP 60s-80s.     1. HTN. Well controlled. No changes. Encouraged to continue to monitor and call if consistently >130/90, we would likely increase lisinopril.   2. CAD. No anginal symptoms. Continue risk factor control. Continue ASA and statin.   3. HL. LDL at goal. TG mildly elevated.     Follow up: Dr Fountain or Alexus in 1 year with fasting labs prior.  Of course, the patient was encouraged to contact us sooner with questions or concerns.    Alexus Gagnon PA-C      CURRENT MEDICATIONS:   Current Outpatient Medications   Medication Sig Dispense Refill     aspirin (ASA) 81 MG tablet Take 1 tablet (81 mg) by mouth daily 90 tablet 3     ezetimibe-simvastatin (VYTORIN) 10-20 MG tablet Take 1 tablet by mouth At Bedtime 90 tablet 3     lisinopril (ZESTRIL) 5 MG tablet Take 1 tablet (5 mg) by mouth daily 90 tablet 3     metFORMIN (GLUCOPHAGE) 500 MG tablet Take 1 tablet (500 mg) by mouth daily (with breakfast) 90 tablet 3     metoprolol succinate ER (TOPROL-XL) 100 MG 24 hr tablet Take 1  "tablet (100 mg) by mouth daily 90 tablet 3     order for DME Equipment being ordered: continuous positive airway pressure machine with associated supplies.    Pressure settin = 8 1 each 0     sildenafil (REVATIO) 20 MG tablet TAKE 2 TABLETS BY MOUTH DAILY AS NEEDED. NEVER USE WITH NITROGLYCERIN, TERAZOSIN OR DOXAZOSIN 60 tablet 1       ALLERGIES:  No Known Allergies    ROS:  Skin:  Positive for bruising   Eyes:  Positive for glasses  ENT:  Negative    Respiratory:  Positive for sleep apnea;CPAP  Cardiovascular:  Negative    Gastroenterology: Negative    Genitourinary:  Negative    Musculoskeletal:  Positive for arthritis;back pain  Neurologic:  Negative    Psychiatric:  Negative    Heme/Lymph/Imm:  Positive for easy bruising  Endocrine:  Positive for diabetes    PHYSICAL EXAMINATION:    GENERAL:  The patient is a pleasant 70 year old who appears their stated age.  In no apparent distress.  VITAL SIGNS:  /62 (BP Location: Right arm, Patient Position: Sitting, Cuff Size: Adult Large)   Pulse (!) 49   Ht 1.638 m (5' 4.5\")   Wt 85 kg (187 lb 8 oz)   SpO2 96%   BMI 31.69 kg/m      RESPIRATORY:  Breathing is nonlabored.    CARDIAC:  RRR  NEUROLOGIC:  Alert and oriented.    DATA REVIEWED:    Last Comprehensive Metabolic Panel:  Sodium   Date Value Ref Range Status   11/02/2020 136 133 - 144 mmol/L Final     Potassium   Date Value Ref Range Status   11/02/2020 4.0 3.4 - 5.3 mmol/L Final     Chloride   Date Value Ref Range Status   11/02/2020 106 94 - 109 mmol/L Final     Carbon Dioxide   Date Value Ref Range Status   11/02/2020 27 20 - 32 mmol/L Final     Anion Gap   Date Value Ref Range Status   11/02/2020 3 3 - 14 mmol/L Final     Glucose   Date Value Ref Range Status   11/02/2020 127 (H) 70 - 99 mg/dL Final     Comment:     Fasting specimen     Urea Nitrogen   Date Value Ref Range Status   11/02/2020 11 7 - 30 mg/dL Final     Creatinine   Date Value Ref Range Status   11/02/2020 0.94 0.66 - 1.25 mg/dL Final "     GFR Estimate   Date Value Ref Range Status   11/02/2020 82 >60 mL/min/[1.73_m2] Final     Comment:     Non  GFR Calc  Starting 12/18/2018, serum creatinine based estimated GFR (eGFR) will be   calculated using the Chronic Kidney Disease Epidemiology Collaboration   (CKD-EPI) equation.       Calcium   Date Value Ref Range Status   11/02/2020 9.5 8.5 - 10.1 mg/dL Final     Lab Results   Component Value Date    CHOL 145 05/18/2021     Lab Results   Component Value Date    HDL 56 05/18/2021     Lab Results   Component Value Date    LDL 56 05/18/2021     Lab Results   Component Value Date    TRIG 165 05/18/2021     Lab Results   Component Value Date    CHOLHDLRATIO 2.6 10/08/2015

## 2021-07-21 NOTE — LETTER
7/21/2021    Kvng Smith MD  [address] [place] [address]    RE: Guillermo FLOWERS Logan       Dear Colleague,    I had the pleasure of seeing Guillermo Ford in the [facility].    SERVICE DATE: 7/21/2021     Primary Cardiologist: Dr Fountain     REASON FOR VISIT:  Guillermo Ford presents for HTN follow up    HISTORY OF PRESENT ILLNESS/ASSESSMENT AND PLAN: :  Cardiovascular history includes HTN, HL and CAD with a prior inferior wall MI in 2005. He was found to have a severe stenosis in the distal right coronary artery involving the PDA, treated with stent placement in the RCA along with angioplasty of the ostium of the PDA.  His EF was reduced to 35%-40% around that time.  Fortunately with revascularization, his EF normalized.  His last nuclear stress test in early 2015 showed a small area of infarction in the inferior wall without any vicenta-infarct ischemia. He also has DM and ROGER. He was recently in to see Dr Fountain for his annual visit and was doing well but noted to have high BP.      Continues to feel well, no CV complaints. BP is being monitored daily at home SBP 100s-low 130s (mostly under 130) and DBP 60s-80s.     1. HTN. Well controlled. No changes. Encouraged to continue to monitor and call if consistently >130/90, we would likely increase lisinopril.   2. CAD. No anginal symptoms. Continue risk factor control. Continue ASA and statin.   3. HL. LDL at goal. TG mildly elevated.     Follow up: Dr Fountain or Alexus in 1 year with fasting labs prior.  Of course, the patient was encouraged to contact us sooner with questions or concerns.    Alexus Gagnon PA-C      CURRENT MEDICATIONS:   Current Outpatient Medications   Medication Sig Dispense Refill     aspirin (ASA) 81 MG tablet Take 1 tablet (81 mg) by mouth daily 90 tablet 3     ezetimibe-simvastatin (VYTORIN) 10-20 MG tablet Take 1 tablet by mouth At Bedtime 90 tablet 3     lisinopril  "(ZESTRIL) 5 MG tablet Take 1 tablet (5 mg) by mouth daily 90 tablet 3     metFORMIN (GLUCOPHAGE) 500 MG tablet Take 1 tablet (500 mg) by mouth daily (with breakfast) 90 tablet 3     metoprolol succinate ER (TOPROL-XL) 100 MG 24 hr tablet Take 1 tablet (100 mg) by mouth daily 90 tablet 3     order for DME Equipment being ordered: continuous positive airway pressure machine with associated supplies.    Pressure settin = 8 1 each 0     sildenafil (REVATIO) 20 MG tablet TAKE 2 TABLETS BY MOUTH DAILY AS NEEDED. NEVER USE WITH NITROGLYCERIN, TERAZOSIN OR DOXAZOSIN 60 tablet 1       ALLERGIES:  No Known Allergies    ROS:  Skin:  Positive for bruising   Eyes:  Positive for glasses  ENT:  Negative    Respiratory:  Positive for sleep apnea;CPAP  Cardiovascular:  Negative    Gastroenterology: Negative    Genitourinary:  Negative    Musculoskeletal:  Positive for arthritis;back pain  Neurologic:  Negative    Psychiatric:  Negative    Heme/Lymph/Imm:  Positive for easy bruising  Endocrine:  Positive for diabetes    PHYSICAL EXAMINATION:    GENERAL:  The patient is a pleasant 70 year old who appears their stated age.  In no apparent distress.  VITAL SIGNS:  /62 (BP Location: Right arm, Patient Position: Sitting, Cuff Size: Adult Large)   Pulse (!) 49   Ht 1.638 m (5' 4.5\")   Wt 85 kg (187 lb 8 oz)   SpO2 96%   BMI 31.69 kg/m      RESPIRATORY:  Breathing is nonlabored.    CARDIAC:  RRR  NEUROLOGIC:  Alert and oriented.    DATA REVIEWED:    Last Comprehensive Metabolic Panel:  Sodium   Date Value Ref Range Status   11/02/2020 136 133 - 144 mmol/L Final     Potassium   Date Value Ref Range Status   11/02/2020 4.0 3.4 - 5.3 mmol/L Final     Chloride   Date Value Ref Range Status   11/02/2020 106 94 - 109 mmol/L Final     Carbon Dioxide   Date Value Ref Range Status   11/02/2020 27 20 - 32 mmol/L Final     Anion Gap   Date Value Ref Range Status   11/02/2020 3 3 - 14 mmol/L Final     Glucose   Date Value Ref Range Status "   11/02/2020 127 (H) 70 - 99 mg/dL Final     Comment:     Fasting specimen     Urea Nitrogen   Date Value Ref Range Status   11/02/2020 11 7 - 30 mg/dL Final     Creatinine   Date Value Ref Range Status   11/02/2020 0.94 0.66 - 1.25 mg/dL Final     GFR Estimate   Date Value Ref Range Status   11/02/2020 82 >60 mL/min/[1.73_m2] Final     Comment:     Non  GFR Calc  Starting 12/18/2018, serum creatinine based estimated GFR (eGFR) will be   calculated using the Chronic Kidney Disease Epidemiology Collaboration   (CKD-EPI) equation.       Calcium   Date Value Ref Range Status   11/02/2020 9.5 8.5 - 10.1 mg/dL Final     Lab Results   Component Value Date    CHOL 145 05/18/2021     Lab Results   Component Value Date    HDL 56 05/18/2021     Lab Results   Component Value Date    LDL 56 05/18/2021     Lab Results   Component Value Date    TRIG 165 05/18/2021     Lab Results   Component Value Date    CHOLHDLRATIO 2.6 10/08/2015             Thank you for allowing me to participate in the care of your patient.      Sincerely,     Alexus Gagnon PA-C     [facility]  cc:   Patrice Fountain MD  [address]  [place] [id number]

## 2021-07-21 NOTE — PATIENT INSTRUCTIONS
Thank you for your [facility] visit today. Your provider has recommended the following:  Medication Changes:  No changes at this time.  Recommendations:  Please call if your BP is consistently >130/90   Follow-up:  See Dr Fountain or Alexus [facility] for cardiology follow up in 1 year.    Reminder:  Please bring in your current medication list or your medication, over the counter supplements and vitamin bottles as we will review these at each office visit.

## 2021-09-17 DIAGNOSIS — N52.9 ERECTILE DYSFUNCTION, UNSPECIFIED ERECTILE DYSFUNCTION TYPE: ICD-10-CM

## 2021-09-20 RX ORDER — SILDENAFIL CITRATE 20 MG/1
TABLET ORAL
Qty: 60 TABLET | Refills: 0 | Status: SHIPPED | OUTPATIENT
Start: 2021-09-20 | End: 2022-01-19

## 2021-09-20 NOTE — TELEPHONE ENCOUNTER
Prescription approved per [facility] Refill Protocol.    Alyce Oropeza RN on 9/20/2021 at 11:01 AM

## 2021-10-24 ENCOUNTER — HEALTH MAINTENANCE LETTER (OUTPATIENT)
Age: 71
End: 2021-10-24

## 2021-12-19 ENCOUNTER — HEALTH MAINTENANCE LETTER (OUTPATIENT)
Age: 71
End: 2021-12-19

## 2021-12-29 ENCOUNTER — NURSE TRIAGE (OUTPATIENT)
Dept: NURSING | Facility: CLINIC | Age: 71
End: 2021-12-29
Payer: COMMERCIAL

## 2021-12-29 DIAGNOSIS — I25.10 CORONARY ARTERY DISEASE INVOLVING NATIVE CORONARY ARTERY OF NATIVE HEART WITHOUT ANGINA PECTORIS: ICD-10-CM

## 2021-12-29 DIAGNOSIS — R73.01 IMPAIRED FASTING GLUCOSE: ICD-10-CM

## 2021-12-29 NOTE — TELEPHONE ENCOUNTER
Calling to request refill on three medications.  Has 3 weeks left of medication.  Has made an appointment to see Dr. Smith on 2/145/2022.    1) lisinopril (ZESTRIL) 5 MG tablet  2) metFORMIN (GLUCOPHAGE) 500 MG tablet  3) metoprolol succinate ER (TOPROL-XL) 100 MG 24 hr tablet    Would like to use the below mail order pharmacy.  [facility] - [place], [place] - [address], fax [phone]    Laura Grady RN, [facility] Triage Nurse Advisor    Reason for Disposition    Caller requesting a refill, no triage required, and triager able to refill per unit policy    Protocols used: MEDICATION QUESTION CALL-A-

## 2021-12-30 NOTE — TELEPHONE ENCOUNTER
Routing refill request to provider for review/approval because:  Drug not active on patient's medication list  Labs not current:  creatinine, potassium  Patient needs to be seen because it has been more than 1 year since last office visit.    Sean ASHFORD RN

## 2021-12-31 RX ORDER — LISINOPRIL 5 MG/1
5 TABLET ORAL DAILY
Qty: 90 TABLET | Refills: 0 | Status: SHIPPED | OUTPATIENT
Start: 2021-12-31 | End: 2022-04-05

## 2021-12-31 RX ORDER — METOPROLOL SUCCINATE 100 MG/1
100 TABLET, EXTENDED RELEASE ORAL DAILY
Qty: 90 TABLET | Refills: 0 | Status: SHIPPED | OUTPATIENT
Start: 2021-12-31 | End: 2022-04-05

## 2022-01-04 ENCOUNTER — TRANSFERRED RECORDS (OUTPATIENT)
Dept: HEALTH INFORMATION MANAGEMENT | Facility: CLINIC | Age: 72
End: 2022-01-04
Payer: COMMERCIAL

## 2022-01-04 LAB — RETINOPATHY: NEGATIVE

## 2022-01-05 DIAGNOSIS — E78.2 MIXED HYPERLIPIDEMIA: ICD-10-CM

## 2022-01-10 RX ORDER — EZETIMIBE AND SIMVASTATIN 10; 20 MG/1; MG/1
1 TABLET ORAL AT BEDTIME
Qty: 90 TABLET | Refills: 0 | Status: SHIPPED | OUTPATIENT
Start: 2022-01-10 | End: 2022-04-11

## 2022-01-10 NOTE — TELEPHONE ENCOUNTER
Prescription approved per [facility] Refill Protocol.  ALAINA refill. Further refills at upcoming appointment.  Roxana Franco RN

## 2022-01-17 DIAGNOSIS — N52.9 ERECTILE DYSFUNCTION, UNSPECIFIED ERECTILE DYSFUNCTION TYPE: ICD-10-CM

## 2022-01-19 RX ORDER — SILDENAFIL CITRATE 20 MG/1
TABLET ORAL
Qty: 60 TABLET | Refills: 0 | Status: SHIPPED | OUTPATIENT
Start: 2022-01-19 | End: 2022-04-11

## 2022-01-19 NOTE — TELEPHONE ENCOUNTER
Prescription approved per [facility] Refill Protocol.  Roxana Franco RN

## 2022-02-09 ENCOUNTER — APPOINTMENT (OUTPATIENT)
Dept: URBAN - METROPOLITAN AREA CLINIC 253 | Age: 72
Setting detail: DERMATOLOGY
End: 2022-02-14

## 2022-02-09 VITALS — RESPIRATION RATE: 15 BRPM | HEIGHT: 65 IN | WEIGHT: 268 LBS

## 2022-02-09 DIAGNOSIS — L21.8 OTHER SEBORRHEIC DERMATITIS: ICD-10-CM

## 2022-02-09 PROCEDURE — OTHER COUNSELING: OTHER

## 2022-02-09 PROCEDURE — OTHER MIPS QUALITY: OTHER

## 2022-02-09 PROCEDURE — 99213 OFFICE O/P EST LOW 20 MIN: CPT

## 2022-02-09 PROCEDURE — OTHER PRESCRIPTION: OTHER

## 2022-02-09 RX ORDER — HYDROCORTISONE 25 MG/G
2.5% CREAM TOPICAL BID, PRN
Qty: 20 | Refills: 0 | Status: ERX | COMMUNITY
Start: 2022-02-09

## 2022-02-09 RX ORDER — KETOCONAZOLE 20 MG/ML
2% SHAMPOO, SUSPENSION TOPICAL QD
Qty: 120 | Refills: 1 | Status: ERX | COMMUNITY
Start: 2022-02-09

## 2022-02-09 ASSESSMENT — LOCATION SIMPLE DESCRIPTION DERM
LOCATION SIMPLE: LEFT EAR
LOCATION SIMPLE: RIGHT EAR
LOCATION SIMPLE: LEFT CHEEK

## 2022-02-09 ASSESSMENT — LOCATION DETAILED DESCRIPTION DERM
LOCATION DETAILED: LEFT INFERIOR CENTRAL MALAR CHEEK
LOCATION DETAILED: RIGHT ANTIHELIX
LOCATION DETAILED: LEFT CYMBA CONCHA

## 2022-02-09 ASSESSMENT — LOCATION ZONE DERM
LOCATION ZONE: EAR
LOCATION ZONE: FACE

## 2022-02-09 NOTE — HPI: RASH
What Type Of Note Output Would You Prefer (Optional)?: Standard Output
Is The Patient Presenting As Previously Scheduled?: Yes
How Severe Is Your Rash?: mild
Is This A New Presentation, Or A Follow-Up?: Rash
Additional History: He uses lever 2000. He has a CPAP that he has worn for 7 years plus, he also recently got a new pair of eye glasses.

## 2022-02-13 ENCOUNTER — HEALTH MAINTENANCE LETTER (OUTPATIENT)
Age: 72
End: 2022-02-13

## 2022-04-05 DIAGNOSIS — I25.10 CORONARY ARTERY DISEASE INVOLVING NATIVE CORONARY ARTERY OF NATIVE HEART WITHOUT ANGINA PECTORIS: ICD-10-CM

## 2022-04-05 DIAGNOSIS — R73.01 IMPAIRED FASTING GLUCOSE: ICD-10-CM

## 2022-04-05 RX ORDER — METOPROLOL SUCCINATE 100 MG/1
100 TABLET, EXTENDED RELEASE ORAL DAILY
Qty: 90 TABLET | Refills: 0 | Status: SHIPPED | OUTPATIENT
Start: 2022-04-05 | End: 2022-04-11

## 2022-04-05 RX ORDER — LISINOPRIL 5 MG/1
5 TABLET ORAL DAILY
Qty: 90 TABLET | Refills: 0 | Status: SHIPPED | OUTPATIENT
Start: 2022-04-05 | End: 2022-04-11

## 2022-04-05 NOTE — TELEPHONE ENCOUNTER
Refill request for:  Lisinopril 5 mg tablet  Metformin 500 mg tablet  Metoprolol succinate  mg     Bobby Perez RN on 4/5/2022 at 10:06 AM

## 2022-04-05 NOTE — TELEPHONE ENCOUNTER
Vicki with [facility] is calling and is requesting a refill for Lisinopril 5 mg tablet and Metformin 500 mg tablet and Metoprolol succinate  mg for patient Guillermo.    Please send prescription to [facility] this can be done by fax, electronic or call and give verbal.  Fax number is [phone] and telephone number is [phone] Reference # [id number].      COVID 19 Nurse Triage Plan/Patient Instructions    Please be aware that novel coronavirus (COVID-19) may be circulating in the community. If you develop symptoms such as fever, cough, or SOB or if you have concerns about the presence of another infection including coronavirus (COVID-19), please contact your health care provider or visit [email or web address].     Disposition/Instructions    Home care recommended. Follow home care protocol based instructions.    Thank you for taking steps to prevent the spread of this virus.  o Limit your contact with others.  o Wear a simple mask to cover your cough.  o Wash your hands well and often.    Resources    [facility]: About COVID-19: [email or web address]    CDC: What to Do If You're Sick: www.cdc.gov/coronavirus/2019-ncov/about/steps-when-sick.html    CDC: Ending Home Isolation: www.cdc.gov/coronavirus/2019-ncov/hcp/disposition-in-home-patients.html     CDC: Caring for Someone: www.cdc.gov/coronavirus/2019-ncov/if-you-are-sick/care-for-someone.html     [facility]: Interim Guidance for Hospital Discharge to Home: www.health.[email or web address].[email or web address]/diseases/coronavirus/hcp/hospdischarge.pdf    [facility] clinical trials (COVID-19 research studies): [email or web address]/um-clinical-trials     Below are the COVID-19 hotlines at the [place] [facility] ([facility]). Interpreters are available.   o For health questions: Call 134-786-4571 or 1-102.625.2820 (7 a.m. to 7 p.m.)  o For questions about schools and childcare: Call 078-376-2782 or 1-981.726.6970 (7  a.m. to 7 p.m.)

## 2022-04-05 NOTE — TELEPHONE ENCOUNTER
Please advise on refill request. Last seen by PCP 11/2020. Routing refill request to provider for review/approval because:  Labs not current:  creatinine, potassium, A1c  Patient needs to be seen because it has been more than 1 year since last office visit.    Sean ASHFORD RN

## 2022-04-10 SDOH — ECONOMIC STABILITY: INCOME INSECURITY: HOW HARD IS IT FOR YOU TO PAY FOR THE VERY BASICS LIKE FOOD, HOUSING, MEDICAL CARE, AND HEATING?: NOT HARD AT ALL

## 2022-04-10 SDOH — ECONOMIC STABILITY: INCOME INSECURITY: IN THE LAST 12 MONTHS, WAS THERE A TIME WHEN YOU WERE NOT ABLE TO PAY THE MORTGAGE OR RENT ON TIME?: NO

## 2022-04-10 SDOH — HEALTH STABILITY: PHYSICAL HEALTH: ON AVERAGE, HOW MANY DAYS PER WEEK DO YOU ENGAGE IN MODERATE TO STRENUOUS EXERCISE (LIKE A BRISK WALK)?: 0 DAYS

## 2022-04-10 SDOH — HEALTH STABILITY: PHYSICAL HEALTH: ON AVERAGE, HOW MANY MINUTES DO YOU ENGAGE IN EXERCISE AT THIS LEVEL?: 0 MIN

## 2022-04-10 SDOH — ECONOMIC STABILITY: TRANSPORTATION INSECURITY
IN THE PAST 12 MONTHS, HAS THE LACK OF TRANSPORTATION KEPT YOU FROM MEDICAL APPOINTMENTS OR FROM GETTING MEDICATIONS?: NO

## 2022-04-10 SDOH — ECONOMIC STABILITY: TRANSPORTATION INSECURITY
IN THE PAST 12 MONTHS, HAS LACK OF TRANSPORTATION KEPT YOU FROM MEETINGS, WORK, OR FROM GETTING THINGS NEEDED FOR DAILY LIVING?: NO

## 2022-04-10 SDOH — ECONOMIC STABILITY: FOOD INSECURITY: WITHIN THE PAST 12 MONTHS, THE FOOD YOU BOUGHT JUST DIDN'T LAST AND YOU DIDN'T HAVE MONEY TO GET MORE.: NEVER TRUE

## 2022-04-10 SDOH — ECONOMIC STABILITY: FOOD INSECURITY: WITHIN THE PAST 12 MONTHS, YOU WORRIED THAT YOUR FOOD WOULD RUN OUT BEFORE YOU GOT MONEY TO BUY MORE.: NEVER TRUE

## 2022-04-10 ASSESSMENT — ENCOUNTER SYMPTOMS
EYE PAIN: 0
HEMATOCHEZIA: 0
SHORTNESS OF BREATH: 0
JOINT SWELLING: 0
PALPITATIONS: 0
PARESTHESIAS: 0
ABDOMINAL PAIN: 0
SORE THROAT: 0
CHILLS: 0
DIARRHEA: 0
HEARTBURN: 0
HEADACHES: 0
FEVER: 0
FREQUENCY: 0
HEMATURIA: 0
COUGH: 0
WEAKNESS: 0
CONSTIPATION: 0
NAUSEA: 0
DIZZINESS: 0
NERVOUS/ANXIOUS: 0
DYSURIA: 0
MYALGIAS: 0
ARTHRALGIAS: 0

## 2022-04-10 ASSESSMENT — SOCIAL DETERMINANTS OF HEALTH (SDOH)
DO YOU BELONG TO ANY CLUBS OR ORGANIZATIONS SUCH AS CHURCH GROUPS UNIONS, FRATERNAL OR ATHLETIC GROUPS, OR SCHOOL GROUPS?: NO
HOW OFTEN DO YOU GET TOGETHER WITH FRIENDS OR RELATIVES?: ONCE A WEEK
ARE YOU MARRIED, WIDOWED, DIVORCED, SEPARATED, NEVER MARRIED, OR LIVING WITH A PARTNER?: LIVING WITH PARTNER
IN A TYPICAL WEEK, HOW MANY TIMES DO YOU TALK ON THE PHONE WITH FAMILY, FRIENDS, OR NEIGHBORS?: MORE THAN THREE TIMES A WEEK
HOW OFTEN DO YOU ATTEND CHURCH OR RELIGIOUS SERVICES?: NEVER

## 2022-04-10 ASSESSMENT — LIFESTYLE VARIABLES
SKIP TO QUESTIONS 9-10: 1
HOW OFTEN DO YOU HAVE SIX OR MORE DRINKS ON ONE OCCASION: NEVER
HOW OFTEN DO YOU HAVE A DRINK CONTAINING ALCOHOL: NEVER
HOW MANY STANDARD DRINKS CONTAINING ALCOHOL DO YOU HAVE ON A TYPICAL DAY: PATIENT DOES NOT DRINK
AUDIT-C TOTAL SCORE: 0

## 2022-04-10 ASSESSMENT — ACTIVITIES OF DAILY LIVING (ADL): CURRENT_FUNCTION: NO ASSISTANCE NEEDED

## 2022-04-11 ENCOUNTER — OFFICE VISIT (OUTPATIENT)
Dept: PEDIATRICS | Facility: CLINIC | Age: 72
End: 2022-04-11
Payer: COMMERCIAL

## 2022-04-11 VITALS
BODY MASS INDEX: 31.48 KG/M2 | OXYGEN SATURATION: 97 % | SYSTOLIC BLOOD PRESSURE: 120 MMHG | WEIGHT: 184.4 LBS | RESPIRATION RATE: 16 BRPM | TEMPERATURE: 97.9 F | HEIGHT: 64 IN | HEART RATE: 60 BPM | DIASTOLIC BLOOD PRESSURE: 64 MMHG

## 2022-04-11 DIAGNOSIS — R73.01 IMPAIRED FASTING GLUCOSE: ICD-10-CM

## 2022-04-11 DIAGNOSIS — N52.9 ERECTILE DYSFUNCTION, UNSPECIFIED ERECTILE DYSFUNCTION TYPE: ICD-10-CM

## 2022-04-11 DIAGNOSIS — I25.10 CORONARY ARTERY DISEASE INVOLVING NATIVE CORONARY ARTERY OF NATIVE HEART WITHOUT ANGINA PECTORIS: ICD-10-CM

## 2022-04-11 DIAGNOSIS — E11.59 TYPE 2 DIABETES MELLITUS WITH OTHER CIRCULATORY COMPLICATION, WITHOUT LONG-TERM CURRENT USE OF INSULIN (H): ICD-10-CM

## 2022-04-11 DIAGNOSIS — Z12.11 SCREEN FOR COLON CANCER: ICD-10-CM

## 2022-04-11 DIAGNOSIS — E78.2 MIXED HYPERLIPIDEMIA: ICD-10-CM

## 2022-04-11 DIAGNOSIS — I10 BENIGN ESSENTIAL HYPERTENSION: ICD-10-CM

## 2022-04-11 LAB
ALBUMIN SERPL-MCNC: 3.5 G/DL (ref 3.4–5)
ALP SERPL-CCNC: 70 U/L (ref 40–150)
ALT SERPL W P-5'-P-CCNC: 37 U/L (ref 0–70)
ANION GAP SERPL CALCULATED.3IONS-SCNC: 6 MMOL/L (ref 3–14)
AST SERPL W P-5'-P-CCNC: 25 U/L (ref 0–45)
BILIRUB SERPL-MCNC: 0.4 MG/DL (ref 0.2–1.3)
BUN SERPL-MCNC: 19 MG/DL (ref 7–30)
CALCIUM SERPL-MCNC: 9.5 MG/DL (ref 8.5–10.1)
CHLORIDE BLD-SCNC: 107 MMOL/L (ref 94–109)
CHOLEST SERPL-MCNC: 133 MG/DL
CO2 SERPL-SCNC: 24 MMOL/L (ref 20–32)
CREAT SERPL-MCNC: 0.98 MG/DL (ref 0.66–1.25)
CREAT UR-MCNC: 221 MG/DL
FASTING STATUS PATIENT QL REPORTED: NORMAL
GFR SERPL CREATININE-BSD FRML MDRD: 83 ML/MIN/1.73M2
GLUCOSE BLD-MCNC: 135 MG/DL (ref 70–99)
HBA1C MFR BLD: 6.1 % (ref 0–5.6)
HDLC SERPL-MCNC: 48 MG/DL
LDLC SERPL CALC-MCNC: 56 MG/DL
MICROALBUMIN UR-MCNC: 14 MG/L
MICROALBUMIN/CREAT UR: 6.33 MG/G CR (ref 0–17)
NONHDLC SERPL-MCNC: 85 MG/DL
POTASSIUM BLD-SCNC: 4.2 MMOL/L (ref 3.4–5.3)
PROT SERPL-MCNC: 7 G/DL (ref 6.8–8.8)
SODIUM SERPL-SCNC: 137 MMOL/L (ref 133–144)
TRIGL SERPL-MCNC: 146 MG/DL

## 2022-04-11 PROCEDURE — 80061 LIPID PANEL: CPT | Performed by: INTERNAL MEDICINE

## 2022-04-11 PROCEDURE — 82043 UR ALBUMIN QUANTITATIVE: CPT | Performed by: INTERNAL MEDICINE

## 2022-04-11 PROCEDURE — 80053 COMPREHEN METABOLIC PANEL: CPT | Performed by: INTERNAL MEDICINE

## 2022-04-11 PROCEDURE — 99397 PER PM REEVAL EST PAT 65+ YR: CPT | Performed by: INTERNAL MEDICINE

## 2022-04-11 PROCEDURE — 83036 HEMOGLOBIN GLYCOSYLATED A1C: CPT | Performed by: INTERNAL MEDICINE

## 2022-04-11 PROCEDURE — 36415 COLL VENOUS BLD VENIPUNCTURE: CPT | Performed by: INTERNAL MEDICINE

## 2022-04-11 PROCEDURE — 99213 OFFICE O/P EST LOW 20 MIN: CPT | Mod: 25 | Performed by: INTERNAL MEDICINE

## 2022-04-11 RX ORDER — EZETIMIBE AND SIMVASTATIN 10; 20 MG/1; MG/1
1 TABLET ORAL AT BEDTIME
Qty: 90 TABLET | Refills: 3 | Status: SHIPPED | OUTPATIENT
Start: 2022-04-11 | End: 2022-04-25

## 2022-04-11 RX ORDER — LISINOPRIL 5 MG/1
5 TABLET ORAL DAILY
Qty: 90 TABLET | Refills: 3 | Status: SHIPPED | OUTPATIENT
Start: 2022-04-11 | End: 2022-04-22

## 2022-04-11 RX ORDER — SILDENAFIL CITRATE 20 MG/1
TABLET ORAL
Qty: 60 TABLET | Refills: 3 | Status: SHIPPED | OUTPATIENT
Start: 2022-04-11 | End: 2022-04-25

## 2022-04-11 RX ORDER — METOPROLOL SUCCINATE 100 MG/1
100 TABLET, EXTENDED RELEASE ORAL DAILY
Qty: 90 TABLET | Refills: 3 | Status: SHIPPED | OUTPATIENT
Start: 2022-04-11 | End: 2022-04-22

## 2022-04-11 ASSESSMENT — ENCOUNTER SYMPTOMS
PARESTHESIAS: 0
NAUSEA: 0
DIARRHEA: 0
SORE THROAT: 0
CHILLS: 0
SHORTNESS OF BREATH: 0
JOINT SWELLING: 0
HEADACHES: 0
FEVER: 0
ARTHRALGIAS: 0
HEARTBURN: 0
HEMATURIA: 0
COUGH: 0
ABDOMINAL PAIN: 0
DIZZINESS: 0
EYE PAIN: 0
FREQUENCY: 0
NERVOUS/ANXIOUS: 0
PALPITATIONS: 0
DYSURIA: 0
MYALGIAS: 0
WEAKNESS: 0
HEMATOCHEZIA: 0
CONSTIPATION: 0

## 2022-04-11 ASSESSMENT — ACTIVITIES OF DAILY LIVING (ADL): CURRENT_FUNCTION: NO ASSISTANCE NEEDED

## 2022-04-11 NOTE — PROGRESS NOTES
"SUBJECTIVE:   Guillermo Ford is a 71 year old male who presents for Preventive Visit.      Patient has been advised of split billing requirements and indicates understanding: Yes  Are you in the first 12 months of your Medicare coverage?  No    Healthy Habits:     In general, how would you rate your overall health?  Excellent    Frequency of exercise:  1 day/week    Duration of exercise:  15-30 minutes    Do you usually eat at least 4 servings of fruit and vegetables a day, include whole grains    & fiber and avoid regularly eating high fat or \"junk\" foods?  No    Taking medications regularly:  Yes    Medication side effects:  None    Ability to successfully perform activities of daily living:  No assistance needed    Home Safety:  No safety concerns identified    Hearing Impairment:  No hearing concerns    In the past 6 months, have you been bothered by leaking of urine?  No    In general, how would you rate your overall mental or emotional health?  Excellent      PHQ-2 Total Score: 0    Additional concerns today:  No    Do you feel safe in your environment? Yes    Have you ever done Advance Care Planning? (For example, a Health Directive, POLST, or a discussion with a medical provider or your loved ones about your wishes): Yes, patient states has an Advance Care Planning document and will bring a copy to the clinic.       Fall risk  Fallen 2 or more times in the past year?: (P) No  Any fall with injury in the past year?: (P) No    Cognitive Screening   1) Repeat 3 items (Leader, Season, Table)    2) Clock draw: ABNORMAL unable to draw hands   3) 3 item recall: Recalls 1 object   Results: ABNORMAL clock, 1-2 items recalled: PROBABLE COGNITIVE IMPAIRMENT, **INFORM PROVIDER**    Mini-CogTM Copyright FELICITA Pond. Licensed by the author for use in [facility]; reprinted with permission (felicia@.[email or web address]). All rights reserved.      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      Reviewed and updated as needed this visit by Provider     Meds               Social History     Tobacco Use     Smoking status: Former Smoker     Quit date: 2000     Years since quittin.9     Smokeless tobacco: Never Used     Tobacco comment: previous 2 ppd smoker   Substance Use Topics     Alcohol use: Not Currently     If you drink alcohol do you typically have >3 drinks per day or >7 drinks per week? No    Alcohol Use 2022   Prescreen: >3 drinks/day or >7 drinks/week? -   Prescreen: >3 drinks/day or >7 drinks/week? No             Current providers sharing in care for this patient include:   Patient Care Team:  Kvng Smith MD as PCP - General (Internal Medicine)  Kvng Smith MD as Assigned PCP  Patrice Fountain MD as Assigned Heart and Vascular Provider    The following health maintenance items are reviewed in Epic and correct as of today:  Health Maintenance Due   Topic Date Due     DIABETIC FOOT EXAM  Never done     COLORECTAL CANCER SCREENING  10/15/2019     MICROALBUMIN  2020     BMP  2021     FALL RISK ASSESSMENT  2021     Lab work is in process  Labs reviewed in EPIC  BP Readings from Last 3 Encounters:   22 120/64   21 110/62   21 (!) 160/85    Wt Readings from Last 3 Encounters:   22 83.6 kg (184 lb 6.4 oz)   21 85 kg (187 lb 8 oz)   21 85.3 kg (188 lb)                  Patient Active Problem List   Diagnosis     Lumbago     Gout     Diverticulitis of colon     Lumbar spinal stenosis     Coronary artery disease involving native coronary artery of native heart without angina pectoris     Mixed hyperlipidemia     Sleep apnea     GERD (gastroesophageal reflux disease)     Impaired fasting glucose     History of acute inferior wall MI     Left  ventricular diastolic dysfunction     Benign essential hypertension     Diabetes mellitus, type 2 (H)     Past Surgical History:   Procedure Laterality Date     HC REPAIR OF NASAL SEPTUM      Septoplasty and uvulectomy     [facility] CORONARY STENT PERCUT, INITIAL VESSEL  2005    PTCA with intracoronary stent placement of, distal RCA and ostial PDA       Social History     Tobacco Use     Smoking status: Former Smoker     Quit date: 2000     Years since quittin.9     Smokeless tobacco: Never Used     Tobacco comment: previous 2 ppd smoker   Substance Use Topics     Alcohol use: Not Currently     Family History   Problem Relation Age of Onset     C.A.D. Mother      C.A.D. Father      Diabetes Brother         unsure of type 1/2     Family History Negative Sister      Family History Negative Son      Family History Negative Daughter      Family History Negative Brother      Cancer - colorectal No family hx of      Prostate Cancer No family hx of          Current Outpatient Medications   Medication Sig Dispense Refill     aspirin (ASA) 81 MG tablet Take 1 tablet (81 mg) by mouth daily 90 tablet 3     ezetimibe-simvastatin (VYTORIN) 10-20 MG tablet Take 1 tablet by mouth At Bedtime 90 tablet 3     lisinopril (ZESTRIL) 5 MG tablet Take 1 tablet (5 mg) by mouth in the morning. 90 tablet 3     metFORMIN (GLUCOPHAGE) 500 MG tablet Take 1 tablet (500 mg) by mouth daily (with breakfast) 90 tablet 3     metoprolol succinate ER (TOPROL-XL) 100 MG 24 hr tablet Take 1 tablet (100 mg) by mouth in the morning. 90 tablet 3     sildenafil (REVATIO) 20 MG tablet 2 tabs daily as needed for erectile dysfunction. 60 tablet 3     order for DME Equipment being ordered: continuous positive airway pressure machine with associated supplies.    Pressure settin = 8 1 each 0     No Known Allergies          Review of Systems   Constitutional: Negative for chills and fever.   HENT: Negative for congestion, ear pain, hearing loss and sore  "throat.    Eyes: Negative for pain and visual disturbance.   Respiratory: Negative for cough and shortness of breath.    Cardiovascular: Negative for chest pain, palpitations and peripheral edema.   Gastrointestinal: Negative for abdominal pain, constipation, diarrhea, heartburn, hematochezia and nausea.   Genitourinary: Negative for dysuria, frequency, genital sores, hematuria, impotence, penile discharge and urgency.   Musculoskeletal: Negative for arthralgias, joint swelling and myalgias.   Skin: Negative for rash.   Neurological: Negative for dizziness, weakness, headaches and paresthesias.   Psychiatric/Behavioral: Negative for mood changes. The patient is not nervous/anxious.      CONSTITUTIONAL: NEGATIVE for fever, chills, change in weight  INTEGUMENTARY/SKIN: NEGATIVE for worrisome rashes, moles or lesions  EYES: NEGATIVE for vision changes or irritation  ENT/MOUTH: NEGATIVE for ear, mouth and throat problems  RESP: NEGATIVE for significant cough or SOB  BREAST: NEGATIVE for masses, tenderness or discharge  CV: NEGATIVE for chest pain, palpitations or peripheral edema  GI: NEGATIVE for nausea, abdominal pain, heartburn, or change in bowel habits  : NEGATIVE for frequency, dysuria, or hematuria  MUSCULOSKELETAL: NEGATIVE for significant arthralgias or myalgia  NEURO: NEGATIVE for weakness, dizziness or paresthesias  ENDOCRINE: NEGATIVE for temperature intolerance, skin/hair changes  HEME: NEGATIVE for bleeding problems  PSYCHIATRIC: NEGATIVE for changes in mood or affect    OBJECTIVE:   /64   Pulse 60   Temp 97.9  F (36.6  C) (Tympanic)   Resp 16   Ht 1.633 m (5' 4.29\")   Wt 83.6 kg (184 lb 6.4 oz)   SpO2 97%   BMI 31.37 kg/m   Estimated body mass index is 31.37 kg/m  as calculated from the following:    Height as of this encounter: 1.633 m (5' 4.29\").    Weight as of this encounter: 83.6 kg (184 lb 6.4 oz).  Physical Exam  GENERAL: healthy, alert and no distress  EYES: Eyes grossly normal to " inspection, PERRL and conjunctivae and sclerae normal  HENT: ear canals and TM's normal, nose and mouth without ulcers or lesions  NECK: no adenopathy, no asymmetry, masses, or scars and thyroid normal to palpation  RESP: lungs clear to auscultation - no rales, rhonchi or wheezes  CV: regular rate and rhythm, normal S1 S2, no S3 or S4, no murmur, click or rub, no peripheral edema and peripheral pulses strong  ABDOMEN: soft, nontender, no hepatosplenomegaly, no masses and bowel sounds normal  MS: no gross musculoskeletal defects noted, no edema  SKIN: no suspicious lesions or rashes  NEURO: Normal strength and tone, mentation intact and speech normal  PSYCH: mentation appears normal, affect normal/bright    Diagnostic Test Results:  Labs reviewed in Epic    ASSESSMENT / PLAN:   (Z12.11) Screen for colon cancer  Comment:   Plan: Adult Gastro Ref - Procedure Only            (E11.59) Type 2 diabetes mellitus with other circulatory complication, without long-term current use of insulin (H)  Comment: Parameters well controlled.  Continue secondary risk factor modification for BP, cholesterol, anticoagulation, and smoking cessation.   Plan: Albumin Random Urine Quantitative with Creat         Ratio, HEMOGLOBIN A1C, Comprehensive metabolic         panel (BMP + Alb, Alk Phos, ALT, AST, Total.         Bili, TP), Lipid panel reflex to direct LDL         Labs today.              (I10) Benign essential hypertension  Comment: at goal.  Plan: Comprehensive metabolic panel (BMP + Alb, Alk         Phos, ALT, AST, Total. Bili, TP)        At goal.     complete physcial exam:  Discussed diet, exercise, testicular self exam, blood pressure, cholesterol, and need for cancer surveillance at appropriate ages.       Patient has been advised of split billing requirements and indicates understanding: Yes    COUNSELING:  Reviewed preventive health counseling, as reflected in patient instructions       Regular exercise       Healthy  "diet/nutrition       Vision screening       Hearing screening       Colon cancer screening       Prostate cancer screening    Estimated body mass index is 31.37 kg/m  as calculated from the following:    Height as of this encounter: 1.633 m (5' 4.29\").    Weight as of this encounter: 83.6 kg (184 lb 6.4 oz).        He reports that he quit smoking about 21 years ago. He has never used smokeless tobacco.      Appropriate preventive services were discussed with this patient, including applicable screening as appropriate for cardiovascular disease, diabetes, osteopenia/osteoporosis, and glaucoma.  As appropriate for age/gender, discussed screening for colorectal cancer, prostate cancer, breast cancer, and cervical cancer. Checklist reviewing preventive services available has been given to the patient.    Reviewed patients plan of care and provided an AVS. The Basic Care Plan (routine screening as documented in Health Maintenance) for Guillermo meets the Care Plan requirement. This Care Plan has been established and reviewed with the Patient.    Counseling Resources:  ATP IV Guidelines  Pooled Cohorts Equation Calculator  Breast Cancer Risk Calculator  Breast Cancer: Medication to Reduce Risk  FRAX Risk Assessment  ICSI Preventive Guidelines  Dietary Guidelines for Americans, 2010  [employer]'s MyPlate  ASA Prophylaxis  Lung CA Screening    Kvng Smith MD  [facility]    Identified Health Risks:  "

## 2022-04-11 NOTE — PATIENT INSTRUCTIONS
"Lab work today:  We can do labs in the exam room today.    No shots today.    With diabetes, you need an diabetic eye exam every year!  Please have your eye doctor forward your yearly eye exam results to me: Dr. Smith, fax . (You could instead just start seeing our eye clinic here at [place] by calling [phone], Dr. Estefani Franco.)     Colonoscopy: you can call [phone] to set up your colonoscopy.  If they have not heard from you, they may call you directly. Cardio exercise is probably the most helpful thing for your heart and future health.  Try to get about 30 minutes of sustained cardio exercise (biking, running, swimming, fast walking) every other day or even every day.  Keeping your heart rate at a \"target\" of (220 - your age) x 0.80 will be your goal.  For example, if you're 60 years old, this would be (220 - 60) x 0.80 = 128 beats per minute for those 30 minutes of exercise.       "

## 2022-04-15 ENCOUNTER — TELEPHONE (OUTPATIENT)
Dept: NURSING | Facility: CLINIC | Age: 72
End: 2022-04-15
Payer: COMMERCIAL

## 2022-04-15 NOTE — TELEPHONE ENCOUNTER
[facility] is calling.    Calling for refill request on his Metformin.    I advised her that that is filled through his PCP and was sent to [facility] last week. No need for refill now.    She verbalized understanding and will cancel the order as it was not patient initiated.     Karen Issa RN  [facility] Nurse Advisor  4/15/2022 at 1:43 PM

## 2022-04-22 ENCOUNTER — OFFICE VISIT (OUTPATIENT)
Dept: CARDIOLOGY | Facility: CLINIC | Age: 72
End: 2022-04-22
Payer: COMMERCIAL

## 2022-04-22 VITALS
SYSTOLIC BLOOD PRESSURE: 112 MMHG | BODY MASS INDEX: 31.34 KG/M2 | HEIGHT: 65 IN | HEART RATE: 47 BPM | DIASTOLIC BLOOD PRESSURE: 68 MMHG | WEIGHT: 188.1 LBS | OXYGEN SATURATION: 98 %

## 2022-04-22 DIAGNOSIS — I25.10 CORONARY ARTERY DISEASE INVOLVING NATIVE HEART WITHOUT ANGINA PECTORIS, UNSPECIFIED VESSEL OR LESION TYPE: ICD-10-CM

## 2022-04-22 DIAGNOSIS — I25.10 CORONARY ARTERY DISEASE INVOLVING NATIVE CORONARY ARTERY OF NATIVE HEART WITHOUT ANGINA PECTORIS: ICD-10-CM

## 2022-04-22 PROCEDURE — 99213 OFFICE O/P EST LOW 20 MIN: CPT | Performed by: INTERNAL MEDICINE

## 2022-04-22 RX ORDER — HYDROCORTISONE 2.5 %
CREAM (GRAM) TOPICAL
COMMUNITY
Start: 2022-02-10 | End: 2023-07-20

## 2022-04-22 RX ORDER — METOPROLOL SUCCINATE 100 MG/1
50 TABLET, EXTENDED RELEASE ORAL DAILY
Qty: 90 TABLET | Refills: 3 | Status: SHIPPED | OUTPATIENT
Start: 2022-04-22 | End: 2023-04-18

## 2022-04-22 RX ORDER — KETOCONAZOLE 20 MG/ML
SHAMPOO TOPICAL
COMMUNITY
Start: 2022-02-10 | End: 2023-07-20

## 2022-04-22 RX ORDER — PILOCARPINE HYDROCHLORIDE 12.5 MG/ML
SOLUTION/ DROPS OPHTHALMIC
COMMUNITY
Start: 2022-01-19 | End: 2023-07-20

## 2022-04-22 RX ORDER — LISINOPRIL 10 MG/1
10 TABLET ORAL DAILY
Qty: 90 TABLET | Refills: 3 | Status: SHIPPED | OUTPATIENT
Start: 2022-04-22 | End: 2023-04-18

## 2022-04-22 NOTE — LETTER
"4/22/2022    Kvng Smith MD  [address] [place] [address]    RE: Guillermo Ford       Dear Colleague,     I had the pleasure of seeing Guillermo Sainielizabeth in the [facility].  CARDIOLOGY CLINIC CONSULTATION    REASON FOR CONSULT: Coronary artery disease    PRIMARY CARE PHYSICIAN:  Kvng Smith    Today's clinic visit entailed:  Review of the result(s) of each unique test - [facility]  25 minutes spent on the date of the encounter doing chart review, review of test results, interpretation of tests, patient visit and documentation   Provider  Link to [facility] Help Grid     The level of medical decision making during this visit was of moderate complexity.      HISTORY OF PRESENT ILLNESS:  This is a very pleasant 71-year-old gentleman who first saw me in January 2020 after he transferred care to me. He has the following cardiac pertinent medical issues     1. Stable coronary artery disease with inferior wall myocardial infarction status post percutaneous coronary intervention in 2005 with future nuclear studies showing no significant ischemia  2. Obesity  3. Hypertension  4. Diabetes  5. Sleep apnea  6. Hypertriglyceridemia     Guillermo is here today for routine follow-up.  He denies any cardiovascular symptoms.  He is functionally very active.  No chest pain syncope presyncope.  No shortness of breath edema orthopnea.  He can easily walk many blocks without any functional limitations.    PAST MEDICAL HISTORY:  Past Medical History:   Diagnosis Date     Alcohol Dependence in Remission     sober since 1996     CAD (coronary artery disease)     cardiac cath 2005: stent to PDA     Diverticulitis of colon 6/9/2008    Diagnosed after diarrheal episode from colchicine; \"Probable\" on CT scan 6/08.     GERD (gastroesophageal reflux disease)      Gout      History of acute inferior wall MI     2005     Left ventricular diastolic dysfunction      Mixed hyperlipidemia      Sleep apnea     C PAP "       MEDICATIONS:  Current Outpatient Medications   Medication     aspirin (ASA) 81 MG tablet     ezetimibe-simvastatin (VYTORIN) 10-20 MG tablet     hydrocortisone 2.5 % cream     ketoconazole (NIZORAL) 2 % external shampoo     lisinopril (ZESTRIL) 10 MG tablet     metFORMIN (GLUCOPHAGE) 500 MG tablet     metoprolol succinate ER (TOPROL-XL) 100 MG 24 hr tablet     order for DME     sildenafil (REVATIO) 20 MG tablet     VUITY 1.25 % SOLN     No current facility-administered medications for this visit.       ALLERGIES:  No Known Allergies    SOCIAL HISTORY:  I have reviewed this patient's social history and updated it with pertinent information if needed. Guillermo Ford  reports that he quit smoking about 22 years ago. He has never used smokeless tobacco. He reports previous alcohol use. He reports that he does not use drugs.    FAMILY HISTORY:  I have reviewed this patient's family history and updated it with pertinent information if needed.   Family History   Problem Relation Age of Onset     C.A.D. Mother      C.A.D. Father      Diabetes Brother         unsure of type 1/2     Family History Negative Sister      Family History Negative Son      Family History Negative Daughter      Family History Negative Brother      Cancer - colorectal No family hx of      Prostate Cancer No family hx of        REVIEW OF SYSTEMS:  Skin:  Negative     Eyes:  Positive for glasses  ENT:  Negative    Respiratory:  Positive for sleep apnea;CPAP  Cardiovascular:  Negative    Gastroenterology: Negative    Genitourinary:  Negative    Musculoskeletal:  Negative    Neurologic:  Negative    Psychiatric:  Negative    Heme/Lymph/Imm:  Negative    Endocrine:  Positive for diabetes      PHYSICAL EXAM:      BP: 112/68 Pulse: (!) 47     SpO2: 98 %      Vital Signs with Ranges  Pulse:  [47] 47  BP: (112)/(68) 112/68  SpO2:  [98 %] 98 %  188 lbs 1.6 oz    Constitutional: awake, alert, no distress  Respiratory: Clear  Cardiovascular: Normal sounds,  no edema, normal JVP  GI: nondistended  Neuropsychiatric: appropriate affact    DATA:  Labs: Pertinent cardiac labs reviewed    Echocardiogram: May 2021 normal LV function normal RV function no major valve disease     ASSESSMENT:  Stable coronary disease without angina     RECOMMENDATIONS:  1. Guillermo is doing well from a cardiac standpoint without any symptoms.   2. I advised him to continue exercising and eat healthy lifestyle with better diet and weight loss.  3. His HR runs low. BP is normal. Recommend reducing Metoprolol from 100, to 50 mg daily and increasing lisinopril to 10 mg daily.  4. Otherwise he will see me routinely in clinic in 1 year with echo.  He has been told to contact us immediately with any clinical changes or symptoms.    KIRAN Zimmerman, [facility]  Cardiology - [facility]  April 22, 2022    Thank you for allowing me to participate in the care of your patient.      Sincerely,     Patrice Fountain MD     [facility]  cc:   Patrice Fountain MD  [address] [facility] [address] [place] [id number]

## 2022-04-22 NOTE — PROGRESS NOTES
"CARDIOLOGY CLINIC CONSULTATION    REASON FOR CONSULT: Coronary artery disease    PRIMARY CARE PHYSICIAN:  Kvng Smith    Today's clinic visit entailed:  Review of the result(s) of each unique test - [facility]  25 minutes spent on the date of the encounter doing chart review, review of test results, interpretation of tests, patient visit and documentation   Provider  Link to [facility] Help Grid     The level of medical decision making during this visit was of moderate complexity.      HISTORY OF PRESENT ILLNESS:  This is a very pleasant 71-year-old gentleman who first saw me in January 2020 after he transferred care to me. He has the following cardiac pertinent medical issues     1. Stable coronary artery disease with inferior wall myocardial infarction status post percutaneous coronary intervention in 2005 with future nuclear studies showing no significant ischemia  2. Obesity  3. Hypertension  4. Diabetes  5. Sleep apnea  6. Hypertriglyceridemia     Guillermo is here today for routine follow-up.  He denies any cardiovascular symptoms.  He is functionally very active.  No chest pain syncope presyncope.  No shortness of breath edema orthopnea.  He can easily walk many blocks without any functional limitations.    PAST MEDICAL HISTORY:  Past Medical History:   Diagnosis Date     Alcohol Dependence in Remission     sober since 1996     CAD (coronary artery disease)     cardiac cath 2005: stent to PDA     Diverticulitis of colon 6/9/2008    Diagnosed after diarrheal episode from colchicine; \"Probable\" on CT scan 6/08.     GERD (gastroesophageal reflux disease)      Gout      History of acute inferior wall MI     2005     Left ventricular diastolic dysfunction      Mixed hyperlipidemia      Sleep apnea     C PAP       MEDICATIONS:  Current Outpatient Medications   Medication     aspirin (ASA) 81 MG tablet     ezetimibe-simvastatin (VYTORIN) 10-20 MG tablet     hydrocortisone 2.5 % cream     ketoconazole (NIZORAL) 2 % external " shampoo     lisinopril (ZESTRIL) 10 MG tablet     metFORMIN (GLUCOPHAGE) 500 MG tablet     metoprolol succinate ER (TOPROL-XL) 100 MG 24 hr tablet     order for DME     sildenafil (REVATIO) 20 MG tablet     VUITY 1.25 % SOLN     No current facility-administered medications for this visit.       ALLERGIES:  No Known Allergies    SOCIAL HISTORY:  I have reviewed this patient's social history and updated it with pertinent information if needed. Guillermo Ford  reports that he quit smoking about 22 years ago. He has never used smokeless tobacco. He reports previous alcohol use. He reports that he does not use drugs.    FAMILY HISTORY:  I have reviewed this patient's family history and updated it with pertinent information if needed.   Family History   Problem Relation Age of Onset     C.A.D. Mother      C.A.D. Father      Diabetes Brother         unsure of type 1/2     Family History Negative Sister      Family History Negative Son      Family History Negative Daughter      Family History Negative Brother      Cancer - colorectal No family hx of      Prostate Cancer No family hx of        REVIEW OF SYSTEMS:  Skin:  Negative     Eyes:  Positive for glasses  ENT:  Negative    Respiratory:  Positive for sleep apnea;CPAP  Cardiovascular:  Negative    Gastroenterology: Negative    Genitourinary:  Negative    Musculoskeletal:  Negative    Neurologic:  Negative    Psychiatric:  Negative    Heme/Lymph/Imm:  Negative    Endocrine:  Positive for diabetes      PHYSICAL EXAM:      BP: 112/68 Pulse: (!) 47     SpO2: 98 %      Vital Signs with Ranges  Pulse:  [47] 47  BP: (112)/(68) 112/68  SpO2:  [98 %] 98 %  188 lbs 1.6 oz    Constitutional: awake, alert, no distress  Respiratory: Clear  Cardiovascular: Normal sounds, no edema, normal JVP  GI: nondistended  Neuropsychiatric: appropriate affact    DATA:  Labs: Pertinent cardiac labs reviewed    Echocardiogram: May 2021 normal LV function normal RV function no major valve  disease     ASSESSMENT:  Stable coronary disease without angina     RECOMMENDATIONS:  1. Guillermo is doing well from a cardiac standpoint without any symptoms.   2. I advised him to continue exercising and eat healthy lifestyle with better diet and weight loss.  3. His HR runs low. BP is normal. Recommend reducing Metoprolol from 100, to 50 mg daily and increasing lisinopril to 10 mg daily.  4. Otherwise he will see me routinely in clinic in 1 year with echo.  He has been told to contact us immediately with any clinical changes or symptoms.    KIRAN Zimmerman, [facility]  Cardiology - [facility]  April 22, 2022

## 2022-04-25 ENCOUNTER — TELEPHONE (OUTPATIENT)
Dept: PEDIATRICS | Facility: CLINIC | Age: 72
End: 2022-04-25
Payer: COMMERCIAL

## 2022-04-25 DIAGNOSIS — R73.01 IMPAIRED FASTING GLUCOSE: ICD-10-CM

## 2022-04-25 DIAGNOSIS — N52.9 ERECTILE DYSFUNCTION, UNSPECIFIED ERECTILE DYSFUNCTION TYPE: ICD-10-CM

## 2022-04-25 DIAGNOSIS — E78.2 MIXED HYPERLIPIDEMIA: ICD-10-CM

## 2022-04-25 RX ORDER — SILDENAFIL CITRATE 20 MG/1
TABLET ORAL
Qty: 60 TABLET | Refills: 3 | Status: SHIPPED | OUTPATIENT
Start: 2022-04-25 | End: 2023-04-18

## 2022-04-25 RX ORDER — EZETIMIBE AND SIMVASTATIN 10; 20 MG/1; MG/1
1 TABLET ORAL AT BEDTIME
Qty: 90 TABLET | Refills: 3 | Status: SHIPPED | OUTPATIENT
Start: 2022-04-25 | End: 2023-04-18

## 2022-04-25 NOTE — TELEPHONE ENCOUNTER
Needs to refill sent to [email or web address] instead of [facility]. Zrai Chiu RN on 4/25/2022 at 2:24 PM

## 2022-04-26 ENCOUNTER — TELEPHONE (OUTPATIENT)
Dept: CARDIOLOGY | Facility: CLINIC | Age: 72
End: 2022-04-26
Payer: COMMERCIAL

## 2022-04-26 NOTE — TELEPHONE ENCOUNTER
Call out to [facility] re: their message regarding lisinopril.     Updated the pharmacist Mirna that the lisinopril 5 mg dose should be discontinued. Dr. Fountain increased dose from 5 mg to 10 mg on 4/22/22. Toya Timomns RN on 4/26/2022 at 11:29 AM

## 2022-04-26 NOTE — TELEPHONE ENCOUNTER
[facility] Call Center    Phone Message    May a detailed message be left on voicemail: yes     Reason for Call: Medication Question or concern regarding medication   Prescription Clarification  Name of Medication: lisinopril (ZESTRIL) 10 MG tablet  Prescribing Provider: Dr. Fountain   Pharmacy: [facility] - [place], [place] - [address] [place] [address]   What on the order needs clarification? Pharmacy calling to report that Guillermo refilled a similar prescription on 4/5 that was prescribed by his PCP.  It was for Lisinopril 5 mg tablets.  They wanted to let Dr. Fountain's team know and see if it's ok to still send out the 10 mg tablets.  Please call the pharmacy back to discuss          Action Taken: Message routed to:  Other: Cardiology    Travel Screening: Not Applicable

## 2022-06-16 RX ORDER — HYDROCORTISONE 25 MG/G
CREAM TOPICAL BID, PRN
Qty: 20 | Refills: 0 | Status: ERX

## 2022-06-21 ENCOUNTER — APPOINTMENT (OUTPATIENT)
Dept: URBAN - METROPOLITAN AREA CLINIC 258 | Age: 72
Setting detail: DERMATOLOGY
End: 2022-06-21

## 2022-06-21 DIAGNOSIS — D22 MELANOCYTIC NEVI: ICD-10-CM

## 2022-06-21 DIAGNOSIS — D18.0 HEMANGIOMA: ICD-10-CM

## 2022-06-21 DIAGNOSIS — L82.0 INFLAMED SEBORRHEIC KERATOSIS: ICD-10-CM

## 2022-06-21 DIAGNOSIS — D485 NEOPLASM OF UNCERTAIN BEHAVIOR OF SKIN: ICD-10-CM

## 2022-06-21 PROBLEM — D48.5 NEOPLASM OF UNCERTAIN BEHAVIOR OF SKIN: Status: ACTIVE | Noted: 2022-06-21

## 2022-06-21 PROBLEM — D22.5 MELANOCYTIC NEVI OF TRUNK: Status: ACTIVE | Noted: 2022-06-21

## 2022-06-21 PROBLEM — D18.01 HEMANGIOMA OF SKIN AND SUBCUTANEOUS TISSUE: Status: ACTIVE | Noted: 2022-06-21

## 2022-06-21 PROCEDURE — 17110 DESTRUCT B9 LESION 1-14: CPT

## 2022-06-21 PROCEDURE — OTHER DEFER: OTHER

## 2022-06-21 PROCEDURE — OTHER MIPS QUALITY: OTHER

## 2022-06-21 PROCEDURE — OTHER LIQUID NITROGEN: OTHER

## 2022-06-21 PROCEDURE — 99213 OFFICE O/P EST LOW 20 MIN: CPT | Mod: 25

## 2022-06-21 PROCEDURE — OTHER COUNSELING: OTHER

## 2022-06-21 ASSESSMENT — LOCATION ZONE DERM
LOCATION ZONE: TRUNK
LOCATION ZONE: NECK
LOCATION ZONE: ARM

## 2022-06-21 ASSESSMENT — LOCATION DETAILED DESCRIPTION DERM
LOCATION DETAILED: RIGHT POSTERIOR SHOULDER
LOCATION DETAILED: LEFT SUPERIOR LATERAL UPPER BACK
LOCATION DETAILED: LEFT SUPERIOR UPPER BACK
LOCATION DETAILED: RIGHT CENTRAL LATERAL NECK
LOCATION DETAILED: RIGHT SUPERIOR UPPER BACK
LOCATION DETAILED: RIGHT ANTERIOR SHOULDER
LOCATION DETAILED: LEFT CLAVICULAR NECK
LOCATION DETAILED: RIGHT MID-UPPER BACK
LOCATION DETAILED: LEFT ANTERIOR SHOULDER
LOCATION DETAILED: RIGHT SUPERIOR LATERAL UPPER BACK

## 2022-06-21 ASSESSMENT — LOCATION SIMPLE DESCRIPTION DERM
LOCATION SIMPLE: NECK
LOCATION SIMPLE: RIGHT SHOULDER
LOCATION SIMPLE: LEFT UPPER BACK
LOCATION SIMPLE: LEFT SHOULDER
LOCATION SIMPLE: LEFT ANTERIOR NECK
LOCATION SIMPLE: RIGHT UPPER BACK

## 2022-06-21 NOTE — PROCEDURE: MIPS QUALITY
Quality 110: Preventive Care And Screening: Influenza Immunization: Influenza Immunization Administered during Influenza season
Detail Level: Detailed
Quality 130: Documentation Of Current Medications In The Medical Record: Current Medications Documented
Quality 226: Preventive Care And Screening: Tobacco Use: Screening And Cessation Intervention: Patient screened for tobacco use, is a smoker AND received Cessation Counseling within the Previous 12 Months
Quality 431: Preventive Care And Screening: Unhealthy Alcohol Use - Screening: Patient not identified as an unhealthy alcohol user when screened for unhealthy alcohol use using a systematic screening method

## 2022-06-21 NOTE — PROCEDURE: DEFER
Instructions (Optional): Patient is going to [place] through his work. He declines treatment today and will return for a biopsy after his work vacation. Instructions (Optional): Patient is going to [place] through his work. He declines treatment today and will return for a biopsy after his work vacation.

## 2022-06-21 NOTE — PROCEDURE: LIQUID NITROGEN
Render Post-Care Instructions In Note?: no
Show Spray Paint Technique Variable?: Yes
Consent: The patient's consent was obtained including but not limited to risks of crusting, scabbing, blistering, scarring, darker or lighter pigmentary change, recurrence, incomplete removal and infection.
Number Of Freeze-Thaw Cycles: 2 freeze-thaw cycles
Medical Necessity Clause: This procedure was medically necessary because the lesions that were treated were:
Detail Level: Detailed
Post-Care Instructions: I reviewed with the patient in detail post-care instructions. Patient is to wear sunprotection, and avoid picking at any of the treated lesions. Pt may apply Vaseline to crusted or scabbing areas.
Medical Necessity Information: It is in your best interest to select a reason for this procedure from the list below. All of these items fulfill various CMS LCD requirements except the new and changing color options.
Spray Paint Text: The liquid nitrogen was applied to the skin utilizing a spray paint frosting technique.
Duration Of Freeze Thaw-Cycle (Seconds): 5-10
Application Tool (Optional): Liquid Nitrogen Sprayer

## 2022-07-22 ENCOUNTER — APPOINTMENT (OUTPATIENT)
Dept: URBAN - METROPOLITAN AREA CLINIC 256 | Age: 72
Setting detail: DERMATOLOGY
End: 2022-07-22

## 2022-07-22 VITALS — HEIGHT: 65.5 IN | WEIGHT: 187 LBS

## 2022-07-22 DIAGNOSIS — D49.2 NEOPLASM OF UNSPECIFIED BEHAVIOR OF BONE, SOFT TISSUE, AND SKIN: ICD-10-CM

## 2022-07-22 DIAGNOSIS — L82.0 INFLAMED SEBORRHEIC KERATOSIS: ICD-10-CM

## 2022-07-22 PROCEDURE — 17110 DESTRUCT B9 LESION 1-14: CPT

## 2022-07-22 PROCEDURE — OTHER COUNSELING: OTHER

## 2022-07-22 PROCEDURE — OTHER LIQUID NITROGEN: OTHER

## 2022-07-22 PROCEDURE — 11102 TANGNTL BX SKIN SINGLE LES: CPT | Mod: 59

## 2022-07-22 PROCEDURE — OTHER BIOPSY BY SHAVE METHOD: OTHER

## 2022-07-22 PROCEDURE — OTHER MIPS QUALITY: OTHER

## 2022-07-22 ASSESSMENT — LOCATION ZONE DERM
LOCATION ZONE: NECK
LOCATION ZONE: TRUNK
LOCATION ZONE: SCALP
LOCATION ZONE: ARM

## 2022-07-22 ASSESSMENT — LOCATION DETAILED DESCRIPTION DERM
LOCATION DETAILED: LEFT SUPERIOR LATERAL UPPER BACK
LOCATION DETAILED: LEFT POSTERIOR SHOULDER
LOCATION DETAILED: RIGHT POSTERIOR SHOULDER
LOCATION DETAILED: RIGHT INFERIOR LATERAL UPPER BACK
LOCATION DETAILED: LEFT MID-UPPER BACK
LOCATION DETAILED: RIGHT CLAVICULAR NECK
LOCATION DETAILED: RIGHT CENTRAL FRONTAL SCALP
LOCATION DETAILED: LEFT MEDIAL UPPER BACK

## 2022-07-22 ASSESSMENT — LOCATION SIMPLE DESCRIPTION DERM
LOCATION SIMPLE: RIGHT ANTERIOR NECK
LOCATION SIMPLE: LEFT SHOULDER
LOCATION SIMPLE: RIGHT SHOULDER
LOCATION SIMPLE: RIGHT SCALP
LOCATION SIMPLE: RIGHT UPPER BACK
LOCATION SIMPLE: LEFT UPPER BACK

## 2022-07-22 NOTE — PROCEDURE: LIQUID NITROGEN
Medical Necessity Clause: This procedure was medically necessary because the lesions that were treated were:
Medical Necessity Information: It is in your best interest to select a reason for this procedure from the list below. All of these items fulfill various CMS LCD requirements except the new and changing color options.
Render Post-Care Instructions In Note?: yes
Duration Of Freeze Thaw-Cycle (Seconds): 5-10
Include Z78.9 (Other Specified Conditions Influencing Health Status) As An Associated Diagnosis?: No
Spray Paint Text: The liquid nitrogen was applied to the skin utilizing a spray paint frosting technique.
Detail Level: Detailed
Post-Care Instructions: - Avoid picking at any of the treated lesions.
Number Of Freeze-Thaw Cycles: 2 freeze-thaw cycles
Consent: - Verbal and written consent was obtained, and risks were reviewed prior to procedure today. \\n- Risks discussed include but are not limited to pain, crusting, scabbing, blistering, scarring, temporary or permanent darker or lighter pigmentary change, recurrence, incomplete resolution, and infection.

## 2022-07-22 NOTE — PROCEDURE: BIOPSY BY SHAVE METHOD
Billing Type: Third-Party Bill
Path Notes (To The Dermatopathologist): Please check margins
Biopsy Method: Personna blade
Detail Level: Detailed
Hide Additional Anticipated Plan?: No
Biopsy Type: H and E
Anesthesia Volume In Cc (Will Not Render If 0): 0.5
Electrodesiccation Text: The wound bed was treated with electrodesiccation after the biopsy was performed.
Depth Of Biopsy: dermis
Notification Instructions: Patient will be notified of biopsy results. However, patient instructed to call the office if not contacted within 2 weeks.
Information: Selecting Yes will display possible errors in your note based on the variables you have selected. This validation is only offered as a suggestion for you. PLEASE NOTE THAT THE VALIDATION TEXT WILL BE REMOVED WHEN YOU FINALIZE YOUR NOTE. IF YOU WANT TO FAX A PRELIMINARY NOTE YOU WILL NEED TO TOGGLE THIS TO 'NO' IF YOU DO NOT WANT IT IN YOUR FAXED NOTE.
Dressing: bandage
Post-Care Instructions: I reviewed with the patient in detail post-care instructions. Patient is to keep the biopsy site dry overnight, and then apply bacitracin twice daily until healed. Patient may apply hydrogen peroxide soaks to remove any crusting.
Electrodesiccation And Curettage Text: The wound bed was treated with electrodesiccation and curettage after the biopsy was performed.
Wound Care: Petrolatum
X Size Of Lesion In Cm: 0
Type Of Destruction Used: Curettage
Was A Bandage Applied: Yes
Cryotherapy Text: The wound bed was treated with cryotherapy after the biopsy was performed.
Anesthesia Type: 0.5% lidocaine with 1:200,000 epinephrine and a 1:10 solution of 8.4% sodium bicarbonate
Curettage Text: The wound bed was treated with curettage after the biopsy was performed.
Consent: Written consent was obtained and risks were reviewed including but not limited to scarring, infection, bleeding, scabbing, incomplete removal, nerve damage and allergy to anesthesia.
Silver Nitrate Text: The wound bed was treated with silver nitrate after the biopsy was performed.
Hemostasis: Drysol

## 2022-07-22 NOTE — PROCEDURE: MIPS QUALITY
Quality 431: Preventive Care And Screening: Unhealthy Alcohol Use - Screening: Patient not identified as an unhealthy alcohol user when screened for unhealthy alcohol use using a systematic screening method
Quality 130: Documentation Of Current Medications In The Medical Record: Current Medications Documented
Quality 226: Preventive Care And Screening: Tobacco Use: Screening And Cessation Intervention: Patient screened for tobacco use and is an ex/non-smoker
Quality 110: Preventive Care And Screening: Influenza Immunization: Influenza Immunization Administered during Influenza season
Quality 265: Biopsy Follow-Up: Biopsy results reviewed, communicated, tracked, and documented
Detail Level: Detailed

## 2022-07-31 ENCOUNTER — HEALTH MAINTENANCE LETTER (OUTPATIENT)
Age: 72
End: 2022-07-31

## 2022-10-10 ENCOUNTER — OFFICE VISIT (OUTPATIENT)
Dept: PEDIATRICS | Facility: CLINIC | Age: 72
End: 2022-10-10
Payer: COMMERCIAL

## 2022-10-10 VITALS
HEART RATE: 60 BPM | RESPIRATION RATE: 18 BRPM | HEIGHT: 65 IN | BODY MASS INDEX: 30.74 KG/M2 | SYSTOLIC BLOOD PRESSURE: 116 MMHG | DIASTOLIC BLOOD PRESSURE: 78 MMHG | WEIGHT: 184.5 LBS | TEMPERATURE: 96.8 F | OXYGEN SATURATION: 97 %

## 2022-10-10 DIAGNOSIS — I10 BENIGN ESSENTIAL HYPERTENSION: ICD-10-CM

## 2022-10-10 DIAGNOSIS — Z12.11 SCREEN FOR COLON CANCER: ICD-10-CM

## 2022-10-10 DIAGNOSIS — G47.30 SLEEP APNEA, UNSPECIFIED TYPE: ICD-10-CM

## 2022-10-10 DIAGNOSIS — E11.9 TYPE 2 DIABETES MELLITUS WITHOUT COMPLICATION, WITHOUT LONG-TERM CURRENT USE OF INSULIN (H): Primary | ICD-10-CM

## 2022-10-10 LAB — HBA1C MFR BLD: 6.5 % (ref 0–5.6)

## 2022-10-10 PROCEDURE — 83036 HEMOGLOBIN GLYCOSYLATED A1C: CPT | Performed by: INTERNAL MEDICINE

## 2022-10-10 PROCEDURE — 36415 COLL VENOUS BLD VENIPUNCTURE: CPT | Performed by: INTERNAL MEDICINE

## 2022-10-10 PROCEDURE — 90471 IMMUNIZATION ADMIN: CPT | Performed by: INTERNAL MEDICINE

## 2022-10-10 PROCEDURE — 90662 IIV NO PRSV INCREASED AG IM: CPT | Performed by: INTERNAL MEDICINE

## 2022-10-10 PROCEDURE — 99213 OFFICE O/P EST LOW 20 MIN: CPT | Mod: 25 | Performed by: INTERNAL MEDICINE

## 2022-10-10 ASSESSMENT — PAIN SCALES - GENERAL: PAINLEVEL: NO PAIN (0)

## 2022-10-10 NOTE — PATIENT INSTRUCTIONS
"Lab work today:  We can do labs in the exam room today, or you can get them done downstairs in the lab.      If you are going downstairs:  Directions:  As you walk through the first floor, you'll see (on the right) first the pharmacy, then some bathrooms, then the \"Lab and Imaging\" area. Give them your name at the window there and wait for them to call you.     Flu shot today.    No changes in your meds.    Follow up in 6 months.    They will call you for a Colonoscopy.    The diabetes educator (CDE) will call you as well.       "

## 2022-10-10 NOTE — PROGRESS NOTES
"  Assessment & Plan     Screen for colon cancer    - Colonoscopy Screening  Referral; Future    Type 2 diabetes mellitus without complication, without long-term current use of insulin (H)  Parameters well controlled.  Continue secondary risk factor modification for BP, cholesterol, anticoagulation, and smoking cessation.   - HEMOGLOBIN A1C; Future  - [facility] Adult Diabetes Educator Referral; Future  - HEMOGLOBIN A1C    Benign essential hypertension      Sleep apnea, unspecified type  Well controlled.  On continuous positive airway pressure.              BMI:   Estimated body mass index is 31.18 kg/m  as calculated from the following:    Height as of this encounter: 1.638 m (5' 4.5\").    Weight as of this encounter: 83.7 kg (184 lb 8 oz).   Weight management plan: Patient was referred to their PCP to discuss a diet and exercise plan.    Patient Instructions   Lab work today:  We can do labs in the exam room today, or you can get them done downstairs in the lab.      If you are going downstairs:  Directions:  As you walk through the first floor, you'll see (on the right) first the pharmacy, then some bathrooms, then the \"Lab and Imaging\" area. Give them your name at the window there and wait for them to call you.     Flu shot today.    No changes in your meds.    Follow up in 6 months.    They will call you for a Colonoscopy.    The diabetes educator (CDE) will call you as well.           Return in about 6 months (around 4/10/2023) for physical, with me, in person.    Kvng Smith MD  [facility] [place] is a 72 year old, presenting for the following health issues:  Diabetes (Medication F/U)      History of Present Illness       Reason for visit:  Physical    He eats 0-1 servings of fruits and vegetables daily.He consumes 2 sweetened beverage(s) daily.He exercises with enough effort to increase his heart rate 10 to 19 minutes per day.  He exercises with enough effort to increase " "his heart rate 4 days per week.   He is taking medications regularly.     Has lost 4 pounds since last visit.    Eating more fish and salads.      Has never done diabetes educator (CDE).        Review of Systems   Constitutional, HEENT, cardiovascular, pulmonary, GI, , musculoskeletal, neuro, skin, endocrine and psych systems are negative, except as otherwise noted.      Objective    /78 (BP Location: Right arm, Patient Position: Sitting, Cuff Size: Adult Large)   Pulse 60   Temp 96.8  F (36  C) (Temporal)   Resp 18   Ht 1.638 m (5' 4.5\")   Wt 83.7 kg (184 lb 8 oz)   SpO2 97%   BMI 31.18 kg/m    Body mass index is 31.18 kg/m .  Physical Exam   GENERAL: healthy, alert and no distress  EYES: Eyes grossly normal to inspection, PERRL and conjunctivae and sclerae normal  HENT: ear canals and TM's normal, nose and mouth without ulcers or lesions  NECK: no adenopathy, no asymmetry, masses, or scars and thyroid normal to palpation  RESP: lungs clear to auscultation - no rales, rhonchi or wheezes  CV: regular rate and rhythm, normal S1 S2, no S3 or S4, no murmur, click or rub, no peripheral edema and peripheral pulses strong  ABDOMEN: soft, nontender, no hepatosplenomegaly, no masses and bowel sounds normal  MS: no gross musculoskeletal defects noted, no edema  SKIN: no suspicious lesions or rashes  NEURO: Normal strength and tone, mentation intact and speech normal  PSYCH: mentation appears normal, affect normal/bright                    "

## 2022-10-11 ENCOUNTER — TELEPHONE (OUTPATIENT)
Dept: PEDIATRICS | Facility: CLINIC | Age: 72
End: 2022-10-11

## 2022-10-11 NOTE — TELEPHONE ENCOUNTER
Diabetes Education Scheduling Outreach #1:    Call to patient to schedule. Left message with phone number to call to schedule.    Also sent [employer] message for second attempt. Requested patient to call to schedule.    Roxana Monk OnCall  Diabetes and Nutrition Scheduling

## 2023-02-27 ENCOUNTER — TELEPHONE (OUTPATIENT)
Dept: PEDIATRICS | Facility: CLINIC | Age: 73
End: 2023-02-27
Payer: COMMERCIAL

## 2023-03-01 ENCOUNTER — TRANSFERRED RECORDS (OUTPATIENT)
Dept: MULTI SPECIALTY CLINIC | Facility: CLINIC | Age: 73
End: 2023-03-01

## 2023-03-01 LAB — RETINOPATHY: NEGATIVE

## 2023-03-02 ENCOUNTER — TELEPHONE (OUTPATIENT)
Dept: CARDIOLOGY | Facility: CLINIC | Age: 73
End: 2023-03-02

## 2023-03-02 DIAGNOSIS — E78.2 MIXED HYPERLIPIDEMIA: Primary | ICD-10-CM

## 2023-03-02 DIAGNOSIS — I10 BENIGN ESSENTIAL HYPERTENSION: ICD-10-CM

## 2023-03-02 NOTE — TELEPHONE ENCOUNTER
Orders extended. Pt also needs annual fasting labs. Messaged scheduling. Toya Timmons RN on 3/2/2023 at 11:12 AM

## 2023-03-02 NOTE — TELEPHONE ENCOUNTER
[facility] Call Center    Phone Message    May a detailed message be left on voicemail: yes     Reason for Call: Other: Patient due for follow up and echo with Dr. Fountain. I have the patient on a wait list for cancellations, however his echo order will  as well. Please extend order.     Action Taken: Other: cardiology    Travel Screening: Not Applicable   Thank you!  [facility]

## 2023-03-10 ENCOUNTER — TRANSFERRED RECORDS (OUTPATIENT)
Dept: PEDIATRICS | Facility: CLINIC | Age: 73
End: 2023-03-10

## 2023-03-10 LAB — RETINOPATHY: NEGATIVE

## 2023-03-16 ENCOUNTER — TELEPHONE (OUTPATIENT)
Dept: PEDIATRICS | Facility: CLINIC | Age: 73
End: 2023-03-16
Payer: COMMERCIAL

## 2023-03-26 ENCOUNTER — HEALTH MAINTENANCE LETTER (OUTPATIENT)
Age: 73
End: 2023-03-26

## 2023-03-28 ENCOUNTER — TELEPHONE (OUTPATIENT)
Dept: PEDIATRICS | Facility: CLINIC | Age: 73
End: 2023-03-28
Payer: COMMERCIAL

## 2023-03-28 NOTE — TELEPHONE ENCOUNTER
Patient is scheduled for an upcoming Annual Wellness Visit on 4/11/2023   - Patient's last Annual Wellness Visit was completed on 4/11/2022     MA-TC pool, please assist the patient in rescheduling his Annual Wellness Visit as it needs to be scheduled at least 1 year and 1 day from his previous Annual Wellness Visit.     Mary NOLAND RN   Patient Advocate Liaison (PAL)  [facility]   [phone]

## 2023-04-03 NOTE — TELEPHONE ENCOUNTER
Patient's Annual Wellness Visit is rescheduled for 4/18/2023 with Dr. Smith.     Next 5 appointments (look out 90 days)    Apr 18, 2023 10:30 AM  (Arrive by 10:10 AM)  Annual Wellness Visit with Kvng Smith MD  [facility] ([facility] ) [address] [place] [address] [address]  [facility] [id number] [phone]        Mary NOLAND RN   Patient Advocate Liaison (PAL)  [facility]   [phone]

## 2023-04-14 ENCOUNTER — TELEPHONE (OUTPATIENT)
Dept: GASTROENTEROLOGY | Facility: CLINIC | Age: 73
End: 2023-04-14
Payer: COMMERCIAL

## 2023-04-14 NOTE — TELEPHONE ENCOUNTER
Screening Questions  BLUE  KIND OF PREP RED  LOCATION [review exclusion criteria] GREEN  SEDATION TYPE        Y Are you active on mychart?       YASH KAM A Ordering/Referring Provider?        bcbs What type of coverage do you have?      N Have you had a positive covid test in the last 14 days?     30.8 1. BMI  [BMI 40+ - review exclusion criteria]    Y  2. Are you able to give consent for your medical care? [IF NO,RN REVIEW]          N  3. Are you taking any prescription pain medications on a routine schedule   (ex narcotics: oxycodone, roxicodone, oxycontin,  and percocet)? [RN Review]        N  3a. EXTENDED PREP What kind of prescription?     N-FORMER DRINKER 20 PLUS YEARS AGO 4. Do you have any chemical dependencies such as alcohol, street drugs, or methadone?        **If yes 3- 5 , please schedule with MAC sedation.**          IF YES TO ANY 6 - 10 - HOSPITAL SETTING ONLY.     N 6.   Do you need assistance transferring?     N 7.   Have you had a heart or lung transplant?    N 8.   Are you currently on dialysis?   N 9.   Do you use daily home oxygen?   N 10. Do you take nitroglycerin?   10a. N If yes, how often?     11. [FEMALES]  N Are you currently pregnant?    11a. N If yes, how many weeks? [ Greater than 12 weeks, OR NEEDED]    N 12. Do you have Pulmonary Hypertension? *NEED PAC APPT AT [facility] w/ MAC*     N 13. [review exclusion criteria]  Do you have any implantable devices in your body (pacemaker, defib, LVAD)?    N 14. In the past 6 months, have you had any heart related issues including cardiomyopathy or heart attack?     14a. N If yes, did it require cardiac stenting if so when?     N 15. Have you had a stroke or Transient ischemic attack (TIA - aka  mini stroke ) within 6 months?      Y 16. Do you have mod to severe Obstructive Sleep Apnea?  [Hospital only]    N 17. Do you have SEVERE AND UNCONTROLLED asthma? *NEED PAC APPT AT [facility] w/MAC*     18. Are you currently taking any blood thinners?     " 18a. No. Continue to 19.   18b. Yes/no Blood Thinner: No [CONTINUE TO #19]    N 19. Do you take the medication Phentermine?    19a. If yes, \"Hold for 7 days before procedure.  Please consult your prescribing provider if you have questions about holding this medication.\"     N  20. Do you have chronic kidney disease?      N -PT SAYS BOARDER LINE TO PRE DIEBETIC 21. Do you have a diagnosis of diabetes?     N  22. On a regular basis do you go 3-5 days between bowel movements?      23. Preferred LOCAL Pharmacy for Pre Prescription    [ LIST ONLY ONE PHARMACY]       [id number] - [place], [place] - [address]        - CLOSING REMINDERS -    Informed patient they will need an adult    Cannot take any type of public or medical transportation alone    Conscious Sedation- Needs  for 6 hours after the procedure       MAC/General-Needs  for 24 hours after procedure    Pre-Pro[facility]    Confirmed Nurse will call to complete assessment       - SCHEDULING DETAILS -  Y Hospital Setting Required? If yes, what is the exclusion?: ROGER GREGORY  Surgeon    07/25/2023 Date of Procedure  Lower Endoscopy [Colonoscopy]  Type of Procedure Scheduled  [facility] Location   [facility]- If you answer yes to questions #8, #20, #21 [  pts ]Which Colonoscopy Prep was Sent?   PER PROVIDER  MODERATE Sedation Type     N PAC / Pre-op Required                 "

## 2023-04-15 SDOH — ECONOMIC STABILITY: INCOME INSECURITY: HOW HARD IS IT FOR YOU TO PAY FOR THE VERY BASICS LIKE FOOD, HOUSING, MEDICAL CARE, AND HEATING?: NOT HARD AT ALL

## 2023-04-15 SDOH — ECONOMIC STABILITY: INCOME INSECURITY: IN THE LAST 12 MONTHS, WAS THERE A TIME WHEN YOU WERE NOT ABLE TO PAY THE MORTGAGE OR RENT ON TIME?: NO

## 2023-04-15 SDOH — ECONOMIC STABILITY: FOOD INSECURITY: WITHIN THE PAST 12 MONTHS, THE FOOD YOU BOUGHT JUST DIDN'T LAST AND YOU DIDN'T HAVE MONEY TO GET MORE.: NEVER TRUE

## 2023-04-15 SDOH — ECONOMIC STABILITY: FOOD INSECURITY: WITHIN THE PAST 12 MONTHS, YOU WORRIED THAT YOUR FOOD WOULD RUN OUT BEFORE YOU GOT MONEY TO BUY MORE.: NEVER TRUE

## 2023-04-15 SDOH — HEALTH STABILITY: PHYSICAL HEALTH: ON AVERAGE, HOW MANY MINUTES DO YOU ENGAGE IN EXERCISE AT THIS LEVEL?: 20 MIN

## 2023-04-15 SDOH — HEALTH STABILITY: PHYSICAL HEALTH: ON AVERAGE, HOW MANY DAYS PER WEEK DO YOU ENGAGE IN MODERATE TO STRENUOUS EXERCISE (LIKE A BRISK WALK)?: 1 DAY

## 2023-04-15 ASSESSMENT — LIFESTYLE VARIABLES
SKIP TO QUESTIONS 9-10: 1
AUDIT-C TOTAL SCORE: 0
HOW MANY STANDARD DRINKS CONTAINING ALCOHOL DO YOU HAVE ON A TYPICAL DAY: PATIENT DOES NOT DRINK
HOW OFTEN DO YOU HAVE A DRINK CONTAINING ALCOHOL: NEVER
HOW OFTEN DO YOU HAVE SIX OR MORE DRINKS ON ONE OCCASION: NEVER

## 2023-04-15 ASSESSMENT — ENCOUNTER SYMPTOMS
SHORTNESS OF BREATH: 0
DYSURIA: 0
ABDOMINAL PAIN: 0
SORE THROAT: 0
FEVER: 0
DIARRHEA: 0
JOINT SWELLING: 0
MYALGIAS: 0
CHILLS: 0
PARESTHESIAS: 0
CONSTIPATION: 0
HEADACHES: 0
ARTHRALGIAS: 0
HEMATURIA: 0
DIZZINESS: 0
HEMATOCHEZIA: 0
FREQUENCY: 0
WEAKNESS: 0
HEARTBURN: 0
NERVOUS/ANXIOUS: 0
NAUSEA: 0
COUGH: 0
EYE PAIN: 0
PALPITATIONS: 0

## 2023-04-15 ASSESSMENT — SOCIAL DETERMINANTS OF HEALTH (SDOH)
IN A TYPICAL WEEK, HOW MANY TIMES DO YOU TALK ON THE PHONE WITH FAMILY, FRIENDS, OR NEIGHBORS?: THREE TIMES A WEEK
DO YOU BELONG TO ANY CLUBS OR ORGANIZATIONS SUCH AS CHURCH GROUPS UNIONS, FRATERNAL OR ATHLETIC GROUPS, OR SCHOOL GROUPS?: NO
HOW OFTEN DO YOU ATTEND CHURCH OR RELIGIOUS SERVICES?: NEVER
ARE YOU MARRIED, WIDOWED, DIVORCED, SEPARATED, NEVER MARRIED, OR LIVING WITH A PARTNER?: LIVING WITH PARTNER
HOW OFTEN DO YOU GET TOGETHER WITH FRIENDS OR RELATIVES?: ONCE A WEEK

## 2023-04-15 ASSESSMENT — ACTIVITIES OF DAILY LIVING (ADL): CURRENT_FUNCTION: NO ASSISTANCE NEEDED

## 2023-04-18 ENCOUNTER — OFFICE VISIT (OUTPATIENT)
Dept: PEDIATRICS | Facility: CLINIC | Age: 73
End: 2023-04-18
Payer: COMMERCIAL

## 2023-04-18 VITALS
SYSTOLIC BLOOD PRESSURE: 108 MMHG | HEIGHT: 65 IN | RESPIRATION RATE: 17 BRPM | TEMPERATURE: 97.7 F | DIASTOLIC BLOOD PRESSURE: 74 MMHG | BODY MASS INDEX: 30.37 KG/M2 | OXYGEN SATURATION: 97 % | WEIGHT: 182.3 LBS | HEART RATE: 57 BPM

## 2023-04-18 DIAGNOSIS — M10.9 GOUT, UNSPECIFIED CAUSE, UNSPECIFIED CHRONICITY, UNSPECIFIED SITE: ICD-10-CM

## 2023-04-18 DIAGNOSIS — M48.061 SPINAL STENOSIS OF LUMBAR REGION, UNSPECIFIED WHETHER NEUROGENIC CLAUDICATION PRESENT: ICD-10-CM

## 2023-04-18 DIAGNOSIS — R73.01 IMPAIRED FASTING GLUCOSE: ICD-10-CM

## 2023-04-18 DIAGNOSIS — Z00.00 ENCOUNTER FOR ANNUAL WELLNESS EXAM IN MEDICARE PATIENT: Primary | ICD-10-CM

## 2023-04-18 DIAGNOSIS — G47.30 SLEEP APNEA, UNSPECIFIED TYPE: ICD-10-CM

## 2023-04-18 DIAGNOSIS — E11.59 TYPE 2 DIABETES MELLITUS WITH OTHER CIRCULATORY COMPLICATION, WITHOUT LONG-TERM CURRENT USE OF INSULIN (H): ICD-10-CM

## 2023-04-18 DIAGNOSIS — E78.2 MIXED HYPERLIPIDEMIA: ICD-10-CM

## 2023-04-18 DIAGNOSIS — I10 BENIGN ESSENTIAL HYPERTENSION: ICD-10-CM

## 2023-04-18 DIAGNOSIS — Z12.11 SCREEN FOR COLON CANCER: ICD-10-CM

## 2023-04-18 DIAGNOSIS — I25.10 CORONARY ARTERY DISEASE INVOLVING NATIVE CORONARY ARTERY OF NATIVE HEART WITHOUT ANGINA PECTORIS: ICD-10-CM

## 2023-04-18 DIAGNOSIS — N52.9 ERECTILE DYSFUNCTION, UNSPECIFIED ERECTILE DYSFUNCTION TYPE: ICD-10-CM

## 2023-04-18 PROBLEM — E11.9 DIABETES MELLITUS, TYPE 2 (H): Status: RESOLVED | Noted: 2021-06-18 | Resolved: 2023-04-18

## 2023-04-18 LAB
ALBUMIN SERPL BCG-MCNC: 4.2 G/DL (ref 3.5–5.2)
ALP SERPL-CCNC: 64 U/L (ref 40–129)
ALT SERPL W P-5'-P-CCNC: 28 U/L (ref 10–50)
ANION GAP SERPL CALCULATED.3IONS-SCNC: 11 MMOL/L (ref 7–15)
AST SERPL W P-5'-P-CCNC: 32 U/L (ref 10–50)
BILIRUB SERPL-MCNC: 0.5 MG/DL
BUN SERPL-MCNC: 13.5 MG/DL (ref 8–23)
CALCIUM SERPL-MCNC: 9.8 MG/DL (ref 8.8–10.2)
CHLORIDE SERPL-SCNC: 104 MMOL/L (ref 98–107)
CHOLEST SERPL-MCNC: 154 MG/DL
CREAT SERPL-MCNC: 1.1 MG/DL (ref 0.67–1.17)
CREAT UR-MCNC: 226 MG/DL
DEPRECATED HCO3 PLAS-SCNC: 24 MMOL/L (ref 22–29)
GFR SERPL CREATININE-BSD FRML MDRD: 72 ML/MIN/1.73M2
GLUCOSE SERPL-MCNC: 124 MG/DL (ref 70–99)
HBA1C MFR BLD: 6.7 % (ref 0–5.6)
HDLC SERPL-MCNC: 56 MG/DL
LDLC SERPL CALC-MCNC: 75 MG/DL
MICROALBUMIN UR-MCNC: <12 MG/L
MICROALBUMIN/CREAT UR: NORMAL MG/G{CREAT}
NONHDLC SERPL-MCNC: 98 MG/DL
POTASSIUM SERPL-SCNC: 4.3 MMOL/L (ref 3.4–5.3)
PROT SERPL-MCNC: 7.1 G/DL (ref 6.4–8.3)
SODIUM SERPL-SCNC: 139 MMOL/L (ref 136–145)
TRIGL SERPL-MCNC: 116 MG/DL

## 2023-04-18 PROCEDURE — 83036 HEMOGLOBIN GLYCOSYLATED A1C: CPT | Performed by: INTERNAL MEDICINE

## 2023-04-18 PROCEDURE — 80061 LIPID PANEL: CPT | Performed by: INTERNAL MEDICINE

## 2023-04-18 PROCEDURE — 36415 COLL VENOUS BLD VENIPUNCTURE: CPT | Performed by: INTERNAL MEDICINE

## 2023-04-18 PROCEDURE — 80053 COMPREHEN METABOLIC PANEL: CPT | Performed by: INTERNAL MEDICINE

## 2023-04-18 PROCEDURE — 82043 UR ALBUMIN QUANTITATIVE: CPT | Performed by: INTERNAL MEDICINE

## 2023-04-18 PROCEDURE — 82570 ASSAY OF URINE CREATININE: CPT | Performed by: INTERNAL MEDICINE

## 2023-04-18 PROCEDURE — G0439 PPPS, SUBSEQ VISIT: HCPCS | Performed by: INTERNAL MEDICINE

## 2023-04-18 PROCEDURE — 99214 OFFICE O/P EST MOD 30 MIN: CPT | Mod: 25 | Performed by: INTERNAL MEDICINE

## 2023-04-18 RX ORDER — SILDENAFIL CITRATE 20 MG/1
TABLET ORAL
Qty: 60 TABLET | Refills: 3 | Status: SHIPPED | OUTPATIENT
Start: 2023-04-18 | End: 2023-06-26

## 2023-04-18 RX ORDER — LISINOPRIL 10 MG/1
10 TABLET ORAL DAILY
Qty: 90 TABLET | Refills: 3 | Status: SHIPPED | OUTPATIENT
Start: 2023-04-18 | End: 2024-04-08

## 2023-04-18 RX ORDER — METOPROLOL SUCCINATE 100 MG/1
50 TABLET, EXTENDED RELEASE ORAL DAILY
Qty: 90 TABLET | Refills: 3 | Status: SHIPPED | OUTPATIENT
Start: 2023-04-18 | End: 2023-09-13

## 2023-04-18 RX ORDER — EZETIMIBE AND SIMVASTATIN 10; 20 MG/1; MG/1
1 TABLET ORAL AT BEDTIME
Qty: 90 TABLET | Refills: 3 | Status: SHIPPED | OUTPATIENT
Start: 2023-04-18 | End: 2024-04-08

## 2023-04-18 ASSESSMENT — ENCOUNTER SYMPTOMS
CHILLS: 0
CONSTIPATION: 0
PALPITATIONS: 0
DYSURIA: 0
FREQUENCY: 0
ARTHRALGIAS: 0
NAUSEA: 0
HEMATOCHEZIA: 0
HEMATURIA: 0
HEADACHES: 0
COUGH: 0
PARESTHESIAS: 0
ABDOMINAL PAIN: 0
SORE THROAT: 0
HEARTBURN: 0
WEAKNESS: 0
SHORTNESS OF BREATH: 0
EYE PAIN: 0
DIZZINESS: 0
DIARRHEA: 0
JOINT SWELLING: 0
NERVOUS/ANXIOUS: 0
MYALGIAS: 0
FEVER: 0

## 2023-04-18 ASSESSMENT — ACTIVITIES OF DAILY LIVING (ADL): CURRENT_FUNCTION: NO ASSISTANCE NEEDED

## 2023-04-18 ASSESSMENT — PAIN SCALES - GENERAL: PAINLEVEL: NO PAIN (0)

## 2023-04-18 NOTE — PROGRESS NOTES
"SUBJECTIVE:   CC: Guillermo is an 72 year old who presents for preventative health visit.        View : No data to display.            Patient has been advised of split billing requirements and indicates understanding: Yes  Healthy Habits:     In general, how would you rate your overall health?  Excellent    Frequency of exercise:  None    Do you usually eat at least 4 servings of fruit and vegetables a day, include whole grains    & fiber and avoid regularly eating high fat or \"junk\" foods?  No    Taking medications regularly:  Yes    Medication side effects:  None    Ability to successfully perform activities of daily living:  No assistance needed    Home Safety:  No safety concerns identified    Hearing Impairment:  No hearing concerns    In the past 6 months, have you been bothered by leaking of urine?  No    In general, how would you rate your overall mental or emotional health?  Excellent      PHQ-2 Total Score: 0    Additional concerns today:  No       Patient would like to be examined for an ear wash in the right ear    Some urgency to urinate during the day; after coffee.  Does not wake up more than once.      Minimal alcohol.         Today's PHQ-2 Score:       4/15/2023    11:07 AM   PHQ-2 (  [employer])   Q1: Little interest or pleasure in doing things 0   Q2: Feeling down, depressed or hopeless 0   PHQ-2 Score 0   Q1: Little interest or pleasure in doing things Not at all   Q2: Feeling down, depressed or hopeless Not at all   PHQ-2 Score 0           Social History     Tobacco Use     Smoking status: Former     Types: Cigarettes     Quit date: 2000     Years since quittin.9     Smokeless tobacco: Never     Tobacco comments:     previous 2 ppd smoker   Vaping Use     Vaping status: Not on file   Substance Use Topics     Alcohol use: Not Currently             4/15/2023    11:06 AM   Alcohol Use   Prescreen: >3 drinks/day or >7 drinks/week? No          View : No data to display.                Last " PSA:   PSA   Date Value Ref Range Status   2017 1.88 0 - 4 ug/L Final     Comment:     Assay Method:  Chemiluminescence using Siemens Vista analyzer       Reviewed orders with patient. Reviewed health maintenance and updated orders accordingly - Yes  Lab work is in process  Labs reviewed in EPIC  BP Readings from Last 3 Encounters:   23 108/74   10/10/22 116/78   22 112/68    Wt Readings from Last 3 Encounters:   23 82.7 kg (182 lb 4.8 oz)   10/10/22 83.7 kg (184 lb 8 oz)   22 85.3 kg (188 lb 1.6 oz)                  Patient Active Problem List   Diagnosis     Lumbago     Gout     Diverticulitis of colon     Lumbar spinal stenosis     Coronary artery disease involving native coronary artery of native heart without angina pectoris     Mixed hyperlipidemia     Sleep apnea     GERD (gastroesophageal reflux disease)     Impaired fasting glucose     History of acute inferior wall MI     Left ventricular diastolic dysfunction     Benign essential hypertension     Type 2 diabetes mellitus with other circulatory complication, without long-term current use of insulin (H)     Past Surgical History:   Procedure Laterality Date      REPAIR OF NASAL SEPTUM      [facility]  [facility]    PTCA with intracoronary stent placement of, distal RCA and ostial PDA       Social History     Tobacco Use     Smoking status: Former     Types: Cigarettes     Quit date: 2000     Years since quittin.9     Smokeless tobacco: Never     Tobacco comments:     previous 2 ppd smoker   Vaping Use     Vaping status: Not on file   Substance Use Topics     Alcohol use: Not Currently     Family History   Problem Relation Age of Onset     C.A.D. Mother      C.A.D. Father      Diabetes Brother         unsure of type 1/2     Family History Negative Sister      Family History Negative Son      Family History Negative Daughter      Family History Negative  "Brother      Cancer - colorectal No family hx of      Prostate Cancer No family hx of          Current Outpatient Medications   Medication Sig Dispense Refill     aspirin (ASA) 81 MG tablet Take 1 tablet (81 mg) by mouth daily 90 tablet 3     ezetimibe-simvastatin (VYTORIN) 10-20 MG tablet Take 1 tablet by mouth At Bedtime 90 tablet 3     hydrocortisone 2.5 % cream Apply twice daily to affected areas on face and ears for 5 to 7 days.       ketoconazole (NIZORAL) 2 % external shampoo Wash face and ears in the shower with shampoo, leave on for 3 to 5 minutes, then rinse off. Use daily until improved, then as needed for sabi       lisinopril (ZESTRIL) 10 MG tablet Take 1 tablet (10 mg) by mouth daily 90 tablet 3     metFORMIN (GLUCOPHAGE) 500 MG tablet Take 1 tablet (500 mg) by mouth daily (with breakfast) 90 tablet 3     metoprolol succinate ER (TOPROL XL) 100 MG 24 hr tablet Take 0.5 tablets (50 mg) by mouth daily 90 tablet 3     order for DME Equipment being ordered: continuous positive airway pressure machine with associated supplies.    Pressure settin = 8 1 each 0     sildenafil (REVATIO) 20 MG tablet 2 tabs daily as needed for erectile dysfunction. 60 tablet 3     VUITY 1.25 % SOLN Apply 1 drop into both eyes once a day       No Known Allergies    Reviewed and updated as needed this visit by clinical staff   Tobacco  Allergies  Meds              Reviewed and updated as needed this visit by Provider                   Past Medical History:   Diagnosis Date     Alcohol Dependence in Remission     sober since 1996     CAD (coronary artery disease)     cardiac cath 2005: stent to PDA     Diverticulitis of colon 6/9/2008    Diagnosed after diarrheal episode from colchicine; \"Probable\" on CT scan 6/08.     GERD (gastroesophageal reflux disease)      Gout      History of acute inferior wall MI     2005     Left ventricular diastolic dysfunction      Mixed hyperlipidemia      Sleep apnea     C PAP      Past Surgical " History:   Procedure Laterality Date      REPAIR OF NASAL SEPTUM      Septoplasty and uvulectomy     [facility] CORONARY STENT [place], INITIAL VESSEL  4/18/2005    PTCA with intracoronary stent placement of, distal RCA and ostial PDA       Review of Systems   Constitutional: Negative for chills and fever.   HENT: Negative for congestion, ear pain, hearing loss and sore throat.    Eyes: Negative for pain and visual disturbance.   Respiratory: Negative for cough and shortness of breath.    Cardiovascular: Negative for chest pain, palpitations and peripheral edema.   Gastrointestinal: Negative for abdominal pain, constipation, diarrhea, heartburn, hematochezia and nausea.   Genitourinary: Positive for impotence and urgency. Negative for dysuria, frequency, genital sores, hematuria and penile discharge.   Musculoskeletal: Negative for arthralgias, joint swelling and myalgias.   Skin: Negative for rash.   Neurological: Negative for dizziness, weakness, headaches and paresthesias.   Psychiatric/Behavioral: Negative for mood changes. The patient is not nervous/anxious.      CONSTITUTIONAL: NEGATIVE for fever, chills, change in weight  INTEGUMENTARY/SKIN: NEGATIVE for worrisome rashes, moles or lesions  EYES: NEGATIVE for vision changes or irritation  ENT: NEGATIVE for ear, mouth and throat problems  RESP: NEGATIVE for significant cough or SOB  CV: NEGATIVE for chest pain, palpitations or peripheral edema  GI: NEGATIVE for nausea, abdominal pain, heartburn, or change in bowel habits   male: negative for dysuria, hematuria, decreased urinary stream, erectile dysfunction, urethral discharge  MUSCULOSKELETAL: NEGATIVE for significant arthralgias or myalgia  NEURO: NEGATIVE for weakness, dizziness or paresthesias  PSYCHIATRIC: NEGATIVE for changes in mood or affect    OBJECTIVE:   /74 (BP Location: Right arm, Patient Position: Sitting, Cuff Size: Adult Regular)   Pulse 57   Temp 97.7  F (36.5  C) (Tympanic)   Resp 17    "Ht 1.638 m (5' 4.5\")   Wt 82.7 kg (182 lb 4.8 oz)   SpO2 97%   BMI 30.81 kg/m      Physical Exam  GENERAL: healthy, alert and no distress  EYES: Eyes grossly normal to inspection, PERRL and conjunctivae and sclerae normal  HENT: ear canals and TM's normal, nose and mouth without ulcers or lesions  NECK: no adenopathy, no asymmetry, masses, or scars and thyroid normal to palpation  RESP: lungs clear to auscultation - no rales, rhonchi or wheezes  CV: regular rate and rhythm, normal S1 S2, no S3 or S4, no murmur, click or rub, no peripheral edema and peripheral pulses strong  ABDOMEN: soft, nontender, no hepatosplenomegaly, no masses and bowel sounds normal   (male): normal male genitalia without lesions or urethral discharge, no hernia  MS: no gross musculoskeletal defects noted, no edema  SKIN: no suspicious lesions or rashes  NEURO: Normal strength and tone, mentation intact and speech normal  PSYCH: mentation appears normal, affect normal/bright    Diagnostic Test Results:  Labs reviewed in Epic    ASSESSMENT/PLAN:   (Z00.00) Encounter for annual wellness exam in Medicare patient  (primary encounter diagnosis)  Comment:   Plan: Comprehensive metabolic panel (BMP + Alb, Alk         Phos, ALT, AST, Total. Bili, TP), Lipid panel         reflex to direct LDL Fasting        Discussed diet, exercise, testicular self exam, blood pressure, cholesterol, and need for cancer surveillance at appropriate ages.     (Z12.11) Screen for colon cancer  Comment:   Plan:     (E11.59) Type 2 diabetes mellitus with other circulatory complication, without long-term current use of insulin (H)  Comment:   Plan: HEMOGLOBIN A1C, Albumin Random Urine         Quantitative with Creat Ratio        Parameters well controlled.  Continue secondary risk factor modification for BP, cholesterol, anticoagulation, and smoking cessation.     (I10) Benign essential hypertension  Comment:   Plan: at goal.     (I25.10) Coronary artery disease " "involving native coronary artery of native heart without angina pectoris  Comment:   Plan: lisinopril (ZESTRIL) 10 MG tablet, metoprolol         succinate ER (TOPROL XL) 100 MG 24 hr tablet        Secondary risk factor modification.     (M10.9) Gout, unspecified cause, unspecified chronicity, unspecified site  Comment:   Plan: no symptoms    (G47.30) Sleep apnea, unspecified type  Comment:   Plan: on continuous positive airway pressure.     (M48.061) Spinal stenosis of lumbar region, unspecified whether neurogenic claudication present  Comment:   Plan: asymptomatic at present.     (E78.2) Mixed hyperlipidemia  Comment:   Plan: ezetimibe-simvastatin (VYTORIN) 10-20 MG tablet        Tolerating well.     (R73.01) Impaired fasting glucose  Comment:   Plan: metFORMIN (GLUCOPHAGE) 500 MG tablet        Continuing metformin.     (N52.9) Erectile dysfunction, unspecified erectile dysfunction type  Comment:   Plan: sildenafil (REVATIO) 20 MG tablet              Patient has been advised of split billing requirements and indicates understanding: Yes      COUNSELING:   Reviewed preventive health counseling, as reflected in patient instructions       Regular exercise       Healthy diet/nutrition       Vision screening       Hearing screening       Colorectal cancer screening       Prostate cancer screening      BMI:   Estimated body mass index is 30.81 kg/m  as calculated from the following:    Height as of this encounter: 1.638 m (5' 4.5\").    Weight as of this encounter: 82.7 kg (182 lb 4.8 oz).   Weight management plan: Patient was referred to their PCP to discuss a diet and exercise plan.      He reports that he quit smoking about 22 years ago. His smoking use included cigarettes. He has never used smokeless tobacco.            Kvng Smith MD  [facility]  "

## 2023-04-18 NOTE — NURSING NOTE
Ear Wash Report    right  ear wash completed. 21 fl. oz of water with hydrogen peroxide were irrigated in right ear.Patient tolerated well.       Baudilio Cormier MA

## 2023-04-18 NOTE — PATIENT INSTRUCTIONS
"Lab work today:  We can do labs in the exam room today, or you can get them done downstairs in the lab.      If you are going downstairs:  Directions:  As you walk through the first floor, you'll see (on the right) first the pharmacy, then some bathrooms, then the \"Lab and Imaging\" area. Give them your name at the window there and wait for them to call you.     Colonoscopy this summer.      "

## 2023-06-13 ENCOUNTER — APPOINTMENT (OUTPATIENT)
Dept: URBAN - METROPOLITAN AREA CLINIC 258 | Age: 73
Setting detail: DERMATOLOGY
End: 2023-06-13

## 2023-06-13 VITALS — WEIGHT: 175 LBS | HEIGHT: 65 IN

## 2023-06-13 DIAGNOSIS — Z71.89 OTHER SPECIFIED COUNSELING: ICD-10-CM

## 2023-06-13 DIAGNOSIS — L81.4 OTHER MELANIN HYPERPIGMENTATION: ICD-10-CM

## 2023-06-13 DIAGNOSIS — L82.0 INFLAMED SEBORRHEIC KERATOSIS: ICD-10-CM

## 2023-06-13 DIAGNOSIS — L91.8 OTHER HYPERTROPHIC DISORDERS OF THE SKIN: ICD-10-CM

## 2023-06-13 DIAGNOSIS — D18.0 HEMANGIOMA: ICD-10-CM

## 2023-06-13 DIAGNOSIS — D22 MELANOCYTIC NEVI: ICD-10-CM

## 2023-06-13 DIAGNOSIS — L82.1 OTHER SEBORRHEIC KERATOSIS: ICD-10-CM

## 2023-06-13 PROBLEM — D22.5 MELANOCYTIC NEVI OF TRUNK: Status: ACTIVE | Noted: 2023-06-13

## 2023-06-13 PROBLEM — D18.01 HEMANGIOMA OF SKIN AND SUBCUTANEOUS TISSUE: Status: ACTIVE | Noted: 2023-06-13

## 2023-06-13 PROCEDURE — 17110 DESTRUCT B9 LESION 1-14: CPT

## 2023-06-13 PROCEDURE — OTHER LIQUID NITROGEN: OTHER

## 2023-06-13 PROCEDURE — 99213 OFFICE O/P EST LOW 20 MIN: CPT | Mod: 25

## 2023-06-13 PROCEDURE — OTHER MIPS QUALITY: OTHER

## 2023-06-13 PROCEDURE — OTHER COUNSELING: OTHER

## 2023-06-13 ASSESSMENT — LOCATION DETAILED DESCRIPTION DERM
LOCATION DETAILED: RIGHT SUPERIOR UPPER BACK
LOCATION DETAILED: LEFT VENTRAL PROXIMAL FOREARM
LOCATION DETAILED: RIGHT SUPERIOR MEDIAL FOREHEAD
LOCATION DETAILED: LEFT AXILLARY VAULT
LOCATION DETAILED: RIGHT SUPERIOR MEDIAL MALAR CHEEK
LOCATION DETAILED: LEFT SUPERIOR LATERAL UPPER BACK
LOCATION DETAILED: RIGHT LATERAL UPPER BACK
LOCATION DETAILED: LEFT LATERAL UPPER BACK
LOCATION DETAILED: LEFT PROXIMAL CALF
LOCATION DETAILED: LEFT INFERIOR LATERAL UPPER BACK

## 2023-06-13 ASSESSMENT — LOCATION SIMPLE DESCRIPTION DERM
LOCATION SIMPLE: RIGHT FOREHEAD
LOCATION SIMPLE: LEFT FOREARM
LOCATION SIMPLE: LEFT AXILLARY VAULT
LOCATION SIMPLE: RIGHT UPPER BACK
LOCATION SIMPLE: LEFT CALF
LOCATION SIMPLE: RIGHT CHEEK
LOCATION SIMPLE: LEFT UPPER BACK

## 2023-06-13 ASSESSMENT — LOCATION ZONE DERM
LOCATION ZONE: AXILLAE
LOCATION ZONE: FACE
LOCATION ZONE: ARM
LOCATION ZONE: LEG
LOCATION ZONE: TRUNK

## 2023-06-13 NOTE — HPI: SKIN LESION
Is This A New Presentation, Or A Follow-Up?: Skin Lesion
Additional History: Patient reports that his doctor recommended he see dermatologist for 1 spot on left forearm.

## 2023-06-13 NOTE — HPI: FULL BODY SKIN EXAMINATION
What Is The Reason For Today's Visit?: Full Body Skin Examination
What Is The Reason For Today's Visit? (Being Monitored For X): concerning skin lesions on an annual basis
Additional History: Patient reports no self history or family history of skin cancer.

## 2023-06-21 DIAGNOSIS — N52.9 ERECTILE DYSFUNCTION, UNSPECIFIED ERECTILE DYSFUNCTION TYPE: ICD-10-CM

## 2023-06-23 RX ORDER — SILDENAFIL CITRATE 20 MG/1
TABLET ORAL
Qty: 60 TABLET | Refills: 0 | OUTPATIENT
Start: 2023-06-23

## 2023-06-24 DIAGNOSIS — N52.9 ERECTILE DYSFUNCTION, UNSPECIFIED ERECTILE DYSFUNCTION TYPE: ICD-10-CM

## 2023-06-26 ENCOUNTER — TELEPHONE (OUTPATIENT)
Dept: PEDIATRICS | Facility: CLINIC | Age: 73
End: 2023-06-26
Payer: COMMERCIAL

## 2023-06-26 DIAGNOSIS — N52.9 ERECTILE DYSFUNCTION, UNSPECIFIED ERECTILE DYSFUNCTION TYPE: ICD-10-CM

## 2023-06-26 RX ORDER — SILDENAFIL CITRATE 20 MG/1
TABLET ORAL
Qty: 60 TABLET | Refills: 0 | OUTPATIENT
Start: 2023-06-26

## 2023-06-26 RX ORDER — SILDENAFIL CITRATE 20 MG/1
TABLET ORAL
Qty: 60 TABLET | Refills: 2 | Status: SHIPPED | OUTPATIENT
Start: 2023-06-26 | End: 2023-12-07

## 2023-06-26 NOTE — TELEPHONE ENCOUNTER
Medication has refills on file, sent Rx to pt requested pharmacy in another encounter.     Arden Davis RN on 6/26/2023 at 3:48 PM

## 2023-06-26 NOTE — TELEPHONE ENCOUNTER
Pt called to inquire about his prescription sildenafil. Pt requesting refill of this prescription be sent to the [facility] on [address]. Informed patient he has refills available, will send to [facility] per pt request.     Arden Davis RN on 6/26/2023 at 3:42 PM

## 2023-06-27 RX ORDER — BISACODYL 5 MG/1
TABLET, DELAYED RELEASE ORAL
Qty: 4 TABLET | Refills: 0 | Status: SHIPPED | OUTPATIENT
Start: 2023-06-27 | End: 2023-09-13

## 2023-07-24 ENCOUNTER — TELEPHONE (OUTPATIENT)
Dept: GASTROENTEROLOGY | Facility: CLINIC | Age: 73
End: 2023-07-24
Payer: COMMERCIAL

## 2023-07-24 NOTE — TELEPHONE ENCOUNTER
Caller: Guillermo  Reason for Reschedule/Cancellation (please be detailed, any staff messages or encounters to note?): patient      Prior to reschedule please review:    Ordering Provider: Luis    Sedation per order: CS    Does patient have any ASC Exclusions, please identify?: Yes-ROGER      Notes on Cancelled Procedure:    Procedure: Lower Endoscopy [Colonoscopy]     Date: 7/25/23    Location: [facility]; [facility] [address] [place] [address]    Surgeon: Gladis      Rescheduled: Yes    Procedure: Lower Endoscopy [Colonoscopy]     Date: 10/23/23    Location: [facility]; [address] [place] [address]    Surgeon: Abel    Sedation Level Scheduled  CS,  Reason for Sedation Level Protocol    Prep/Instructions updated and sent: Yes     Send In - basket message to Panc - Isauro Pool if EUS  procedure is canceled or rescheduled: [ N/A, YES or NO] NA

## 2023-08-03 ENCOUNTER — HOSPITAL ENCOUNTER (OUTPATIENT)
Dept: CARDIOLOGY | Facility: CLINIC | Age: 73
Discharge: HOME | End: 2023-08-03
Attending: INTERNAL MEDICINE | Admitting: INTERNAL MEDICINE
Payer: COMMERCIAL

## 2023-08-03 ENCOUNTER — LAB (OUTPATIENT)
Dept: LAB | Facility: CLINIC | Age: 73
End: 2023-08-03
Attending: INTERNAL MEDICINE
Payer: COMMERCIAL

## 2023-08-03 DIAGNOSIS — E78.2 MIXED HYPERLIPIDEMIA: ICD-10-CM

## 2023-08-03 DIAGNOSIS — I10 BENIGN ESSENTIAL HYPERTENSION: ICD-10-CM

## 2023-08-03 DIAGNOSIS — I25.10 CORONARY ARTERY DISEASE INVOLVING NATIVE HEART WITHOUT ANGINA PECTORIS, UNSPECIFIED VESSEL OR LESION TYPE: ICD-10-CM

## 2023-08-03 DIAGNOSIS — I25.10 CORONARY ARTERY DISEASE INVOLVING NATIVE CORONARY ARTERY OF NATIVE HEART WITHOUT ANGINA PECTORIS: ICD-10-CM

## 2023-08-03 LAB
ALT SERPL W P-5'-P-CCNC: 26 U/L (ref 0–70)
ANION GAP SERPL CALCULATED.3IONS-SCNC: 9 MMOL/L (ref 7–15)
BUN SERPL-MCNC: 15.2 MG/DL (ref 8–23)
CALCIUM SERPL-MCNC: 9.4 MG/DL (ref 8.8–10.2)
CHLORIDE SERPL-SCNC: 102 MMOL/L (ref 98–107)
CHOLEST SERPL-MCNC: 144 MG/DL
CREAT SERPL-MCNC: 0.95 MG/DL (ref 0.67–1.17)
DEPRECATED HCO3 PLAS-SCNC: 23 MMOL/L (ref 22–29)
GFR SERPL CREATININE-BSD FRML MDRD: 86 ML/MIN/1.73M2
GLUCOSE SERPL-MCNC: 131 MG/DL (ref 70–99)
HDLC SERPL-MCNC: 54 MG/DL
LDLC SERPL CALC-MCNC: 69 MG/DL
LVEF ECHO: NORMAL
NONHDLC SERPL-MCNC: 90 MG/DL
POTASSIUM SERPL-SCNC: 4 MMOL/L (ref 3.4–5.3)
SODIUM SERPL-SCNC: 134 MMOL/L (ref 136–145)
TRIGL SERPL-MCNC: 107 MG/DL

## 2023-08-03 PROCEDURE — 36415 COLL VENOUS BLD VENIPUNCTURE: CPT | Performed by: INTERNAL MEDICINE

## 2023-08-03 PROCEDURE — 93306 TTE W/DOPPLER COMPLETE: CPT | Mod: 26 | Performed by: INTERNAL MEDICINE

## 2023-08-03 PROCEDURE — 80061 LIPID PANEL: CPT | Performed by: INTERNAL MEDICINE

## 2023-08-03 PROCEDURE — 80048 BASIC METABOLIC PNL TOTAL CA: CPT | Performed by: INTERNAL MEDICINE

## 2023-08-03 PROCEDURE — 84460 ALANINE AMINO (ALT) (SGPT): CPT | Performed by: INTERNAL MEDICINE

## 2023-08-03 PROCEDURE — 93306 TTE W/DOPPLER COMPLETE: CPT

## 2023-08-20 ENCOUNTER — HEALTH MAINTENANCE LETTER (OUTPATIENT)
Age: 73
End: 2023-08-20

## 2023-09-13 ENCOUNTER — OFFICE VISIT (OUTPATIENT)
Dept: CARDIOLOGY | Facility: CLINIC | Age: 73
End: 2023-09-13
Attending: INTERNAL MEDICINE
Payer: COMMERCIAL

## 2023-09-13 VITALS
BODY MASS INDEX: 30.39 KG/M2 | WEIGHT: 182.4 LBS | DIASTOLIC BLOOD PRESSURE: 79 MMHG | HEART RATE: 52 BPM | HEIGHT: 65 IN | SYSTOLIC BLOOD PRESSURE: 133 MMHG

## 2023-09-13 DIAGNOSIS — I25.10 CORONARY ARTERY DISEASE INVOLVING NATIVE HEART WITHOUT ANGINA PECTORIS, UNSPECIFIED VESSEL OR LESION TYPE: ICD-10-CM

## 2023-09-13 DIAGNOSIS — I25.10 CORONARY ARTERY DISEASE INVOLVING NATIVE CORONARY ARTERY OF NATIVE HEART WITHOUT ANGINA PECTORIS: ICD-10-CM

## 2023-09-13 PROCEDURE — 99214 OFFICE O/P EST MOD 30 MIN: CPT | Performed by: INTERNAL MEDICINE

## 2023-09-13 RX ORDER — METOPROLOL SUCCINATE 25 MG/1
25 TABLET, EXTENDED RELEASE ORAL DAILY
Qty: 90 TABLET | Refills: 3 | Status: SHIPPED | OUTPATIENT
Start: 2023-09-13 | End: 2023-12-22

## 2023-09-13 NOTE — LETTER
"9/13/2023    Kvng Smith MD  [address] [place] [address]    RE: Guillermo Ford       Dear Colleague,     I had the pleasure of seeing Guillermo LIONEL Ford in the [facility].  CARDIOLOGY CLINIC CONSULTATION    PRIMARY CARE PHYSICIAN:  Kvng Smith    HISTORY OF PRESENT ILLNESS:  This is a very pleasant 73-year-old gentleman who first saw me in January 2020 after he transferred care to me. He has the following cardiac pertinent medical issues     Stable coronary artery disease with inferior wall myocardial infarction status post percutaneous coronary intervention in 2005 with future nuclear studies showing no significant ischemia  Obesity  Hypertension  Diabetes  Sleep apnea  Hypertriglyceridemia     Guillermo is here today for routine follow-up.  He denies any cardiovascular symptoms.  He is functionally very active.  No chest pain syncope presyncope.  No shortness of breath edema orthopnea.  He can easily walk many blocks without any functional limitations. He complains of mild dizziness when he bends down, but no syncope. He is on 50 of metoprolol and HRs are in the 50s. Recent echo was normal, mild LVH.     PAST MEDICAL HISTORY:  Past Medical History:   Diagnosis Date    Alcohol Dependence in Remission     sober since 1996    CAD (coronary artery disease)     cardiac cath 2005: stent to PDA    Diverticulitis of colon 6/9/2008    Diagnosed after diarrheal episode from colchicine; \"Probable\" on CT scan 6/08.    GERD (gastroesophageal reflux disease)     Gout     History of acute inferior wall MI     2005    Left ventricular diastolic dysfunction     Mixed hyperlipidemia     Sleep apnea     C PAP       MEDICATIONS:  Current Outpatient Medications   Medication    aspirin (ASA) 81 MG tablet    ezetimibe-simvastatin (VYTORIN) 10-20 MG tablet    lisinopril (ZESTRIL) 10 MG tablet    metFORMIN (GLUCOPHAGE) 500 MG tablet    metoprolol succinate ER (TOPROL XL) 25 MG 24 hr tablet    sildenafil " (REVATIO) 20 MG tablet    order for DME     No current facility-administered medications for this visit.       SOCIAL HISTORY:  I have reviewed this patient's social history and updated it with pertinent information if needed. Guillermo Ford  reports that he quit smoking about 23 years ago. His smoking use included cigarettes. He has never used smokeless tobacco. He reports that he does not currently use alcohol. He reports that he does not use drugs.    PHYSICAL EXAM:  Pulse:  [52] 52  BP: (133)/(79) 133/79  182 lbs 6.4 oz    Constitutional: alert, no distress  Respiratory: Good bilateral air entry  Cardiovascular: Normal, no edema  GI: nondistended  Neuropsychiatric: appropriate affact    ASSESSMENT: Pertinent issues addressed/ reviewed during this cardiology visit  Stable CAD    RECOMMENDATIONS:  No symptoms besides mild lightheadedness occasionally, likely from low HRs.   Recommend reducing metoprolol to 25 mg daily.  Continue aspirin statin.  Follow up in 1 year.  Healthy diet, exercise, weight loss. Watch Hgb A1c closely with PCP.    It was a pleasure seeing this patient in clinic today. Please do not hesitate to contact me with any future questions.     KIRAN Zimmerman, [facility]  Cardiology - [facility]  September 13, 2023    Review of the result(s) of each unique test - Last Echo CBC lipids.     The level of medical decision making during this visit was of moderate complexity.    This note was completed in part using dictation via the Dragon voice recognition software. Some word and grammatical errors may occur and must be interpreted in the appropriate clinical context.  If there are any questions pertaining to this issue, please contact me for further clarification.      Thank you for allowing me to participate in the care of your patient.      Sincerely,     Patrice Fountain MD     [facility]  cc:   Patrice Fountain MD  [address]  [place]   [place] [address]

## 2023-09-13 NOTE — PATIENT INSTRUCTIONS
Recommend reducing metoprolol to 25 mg daily.  Continue aspirin statin.  Follow up in 1 year.  Healthy diet, exercise, weight loss. Watch Hgb A1c closely with PCP.

## 2023-09-13 NOTE — PROGRESS NOTES
"CARDIOLOGY CLINIC CONSULTATION    PRIMARY CARE PHYSICIAN:  Kvng Smith    HISTORY OF PRESENT ILLNESS:  This is a very pleasant 73-year-old gentleman who first saw me in January 2020 after he transferred care to me. He has the following cardiac pertinent medical issues     Stable coronary artery disease with inferior wall myocardial infarction status post percutaneous coronary intervention in 2005 with future nuclear studies showing no significant ischemia  Obesity  Hypertension  Diabetes  Sleep apnea  Hypertriglyceridemia     Guillermo is here today for routine follow-up.  He denies any cardiovascular symptoms.  He is functionally very active.  No chest pain syncope presyncope.  No shortness of breath edema orthopnea.  He can easily walk many blocks without any functional limitations. He complains of mild dizziness when he bends down, but no syncope. He is on 50 of metoprolol and HRs are in the 50s. Recent echo was normal, mild LVH.     PAST MEDICAL HISTORY:  Past Medical History:   Diagnosis Date    Alcohol Dependence in Remission     sober since 1996    CAD (coronary artery disease)     cardiac cath 2005: stent to PDA    Diverticulitis of colon 6/9/2008    Diagnosed after diarrheal episode from colchicine; \"Probable\" on CT scan 6/08.    GERD (gastroesophageal reflux disease)     Gout     History of acute inferior wall MI     2005    Left ventricular diastolic dysfunction     Mixed hyperlipidemia     Sleep apnea     C PAP       MEDICATIONS:  Current Outpatient Medications   Medication    aspirin (ASA) 81 MG tablet    ezetimibe-simvastatin (VYTORIN) 10-20 MG tablet    lisinopril (ZESTRIL) 10 MG tablet    metFORMIN (GLUCOPHAGE) 500 MG tablet    metoprolol succinate ER (TOPROL XL) 25 MG 24 hr tablet    sildenafil (REVATIO) 20 MG tablet    order for DME     No current facility-administered medications for this visit.       SOCIAL HISTORY:  I have reviewed this patient's social history and updated it with pertinent " information if needed. Guillermo Ford  reports that he quit smoking about 23 years ago. His smoking use included cigarettes. He has never used smokeless tobacco. He reports that he does not currently use alcohol. He reports that he does not use drugs.    PHYSICAL EXAM:  Pulse:  [52] 52  BP: (133)/(79) 133/79  182 lbs 6.4 oz    Constitutional: alert, no distress  Respiratory: Good bilateral air entry  Cardiovascular: Normal, no edema  GI: nondistended  Neuropsychiatric: appropriate affact    ASSESSMENT: Pertinent issues addressed/ reviewed during this cardiology visit  Stable CAD    RECOMMENDATIONS:  No symptoms besides mild lightheadedness occasionally, likely from low HRs.   Recommend reducing metoprolol to 25 mg daily.  Continue aspirin statin.  Follow up in 1 year.  Healthy diet, exercise, weight loss. Watch Hgb A1c closely with PCP.    It was a pleasure seeing this patient in clinic today. Please do not hesitate to contact me with any future questions.     KIRAN Zimmerman, [facility]  Cardiology - [facility]  September 13, 2023    Review of the result(s) of each unique test - Last Echo CBC lipids.     The level of medical decision making during this visit was of moderate complexity.    This note was completed in part using dictation via the Dragon voice recognition software. Some word and grammatical errors may occur and must be interpreted in the appropriate clinical context.  If there are any questions pertaining to this issue, please contact me for further clarification.

## 2023-10-12 RX ORDER — BISACODYL 5 MG/1
TABLET, DELAYED RELEASE ORAL
Qty: 4 TABLET | Refills: 0 | Status: SHIPPED | OUTPATIENT
Start: 2023-10-12 | End: 2023-12-07

## 2023-10-20 ENCOUNTER — TELEPHONE (OUTPATIENT)
Dept: GASTROENTEROLOGY | Facility: CLINIC | Age: 73
End: 2023-10-20
Payer: COMMERCIAL

## 2023-10-20 NOTE — TELEPHONE ENCOUNTER
Caller: Guillermo Ford    Reason for Reschedule/Cancellation (please be detailed, any staff messages or encounters to note?): PT HAS TESTED + FOR COVID      Prior to reschedule please review:  Ordering Provider: Luis  Sedation per order: CS  Does patient have any ASC Exclusions, please identify?: Yes-ROGER      Notes on Cancelled Procedure:  Procedure: Lower Endoscopy [Colonoscopy]   Date: 10/23/23  Location: [facility]; [address] [place] [address]  Surgeon: Abel    Rescheduled: Yes  Procedure: Lower Endoscopy [Colonoscopy]   Date: 12/22/23  Location[facility]; [address] [place] [address]  Surgeon: BRI  Sedation Level Scheduled  MODERATE,  Reason for Sedation Level PER ORDER  Prep/Instructions updated and sent: YES/LILI       Send In - basket message to Panc - Isauro Pool if EUS  procedure is canceled or rescheduled: [ N/A, YES or NO] N/A

## 2023-11-22 ENCOUNTER — TELEPHONE (OUTPATIENT)
Dept: GASTROENTEROLOGY | Facility: CLINIC | Age: 73
End: 2023-11-22
Payer: COMMERCIAL

## 2023-11-22 NOTE — TELEPHONE ENCOUNTER
"Screening questions completed but redoing as patient is rescheduling further out. Informed patient that referral has  and will need a new referral. Instructed patient to connect with ordering physician. Patient expressed understanding.      Endoscopy Scheduling Screen    Have you had a positive Covid test in the last 14 days?  No    Are you active on MyChart?   Yes    What insurance is in the chart?  Other:  [employer]    Ordering/Referring Provider: Kvng Smith MD   (If ordering provider performs procedure, schedule with ordering provider unless otherwise instructed. )    BMI: Estimated body mass index is 30.83 kg/m  as calculated from the following:    Height as of 23: 1.638 m (5' 4.5\").    Weight as of 23: 82.7 kg (182 lb 6.4 oz).     Sedation Ordered  moderate sedation.   If patient BMI > 50 do not schedule in [facility].    If patient BMI > 45 do not schedule at [facility].    Are you taking methadone or Suboxone?  No    Are you taking any prescription medications for pain 3 or more times per week?   No    Do you have a history of malignant hyperthermia or adverse reaction to anesthesia?  No    (Females) Are you currently pregnant?   No     Have you been diagnosed or told you have pulmonary hypertension?   No    Do you have an LVAD?  No    Have you been told you have moderate to severe sleep apnea?  Yes (RN Review required for scheduling unless scheduling in Hospital.)    Have you been told you have COPD, asthma, or any other lung disease?  No    Do you have any heart conditions?  Yes     In the past 6 months, have you had any hospitalizations for heart related issues including cardiomyopathy, heart attack, or stent placement?  No    Do you have any implantable devices in your body (pacemaker, ICD)?  No    Do you take nitroglycerine?  No    Have you ever had an organ transplant?   No    Have you ever had or are you awaiting a heart or lung transplant?   No    Have you had a stroke or transient ischemic attack " "(TIA aka \"mini stroke\" in the last 6 months?   No    Have you been diagnosed with or been told you have cirrhosis of the liver?   No    Are you currently on dialysis?   No    Do you need assistance transferring?   No    BMI: Estimated body mass index is 30.83 kg/m  as calculated from the following:    Height as of 9/13/23: 1.638 m (5' 4.5\").    Weight as of 9/13/23: 82.7 kg (182 lb 6.4 oz).     Is patients BMI > 40 and scheduling location [facility]?  No    Do you take an injectable medication for weight loss or diabetes (excluding insulin)?  No    Do you take the medication Naltrexone?  No    Do you take blood thinners?  No       Prep   Are you currently on dialysis or do you have chronic kidney disease?  No    Do you have a diagnosis of diabetes?  No    Do you have a diagnosis of cystic fibrosis (CF)?  No    On a regular basis do you go 3 -5 days between bowel movements?  No    BMI > 40?  No    Preferred Pharmacy:      [id number] - [facility] [address]  [address] [address]  Phone: [phone] Fax: [phone]      Final Scheduling Details   Colonoscopy prep sent?  Standard MiraLAX    Procedure scheduled  Colonoscopy    Surgeon:  BRI     Date of procedure:  3/12     Pre-OP / PAC:   No - Not required for this site.    Location  [facility] - Patient preference.    Sedation   Moderate Sedation - Per order.      Patient Reminders:   You will receive a call from a Nurse to review instructions and health history.  This assessment must be completed prior to your procedure.  Failure to complete the Nurse assessment may result in the procedure being cancelled.      On the day of your procedure, please designate an adult(s) who can drive you home stay with you for the next 24 hours. The medicines used in the exam will make you sleepy. You will not be able to drive.      You cannot take public transportation, ride share services, or non-medical taxi service without a responsible caregiver.  " Medical transport services are allowed with the requirement that a responsible caregiver will receive you at your destination.  We require that drivers and caregivers are confirmed prior to your procedure.

## 2023-11-29 ENCOUNTER — NURSE TRIAGE (OUTPATIENT)
Dept: PEDIATRICS | Facility: CLINIC | Age: 73
End: 2023-11-29
Payer: COMMERCIAL

## 2023-11-29 DIAGNOSIS — U07.1 INFECTION DUE TO 2019 NOVEL CORONAVIRUS: Primary | ICD-10-CM

## 2023-11-29 RX ORDER — KETOCONAZOLE 20 MG/ML
2% SHAMPOO, SUSPENSION TOPICAL QD
Qty: 120 | Refills: 5 | Status: ERX

## 2023-11-29 NOTE — TELEPHONE ENCOUNTER
RN COVID TREATMENT VISIT  11/29/23    The patient has been triaged and does not require a higher level of care.    Guillermo Ford  73 year old  Current weight? 182 lb    Has the patient been seen by a primary care provider at an [facility] or [facility] within the past two years? Yes.   Have you been in close proximity to/do you have a known exposure to a person with a confirmed case of influenza? No.     General treatment eligibility:  Date of positive COVID test (PCR or at home)?  11/29/23    Are you or have you been hospitalized for this COVID-19 infection? No.   Have you received monoclonal antibodies or antiviral treatment for COVID-19 since this positive test? No.   Do you have any of the following conditions that place you at risk of being very sick from COVID-19?   - Age 50 years or older  Yes, patient has at least one high risk condition as noted above.     Current COVID symptoms:   - cough  - muscle or body aches  - sore throat  Yes. Patient has at least one symptom as selected.     How many days since symptoms started? 5 days or less. Established patient, 12 years or older weighing at least 88.2 lbs, who has symptoms that started in the past 5 days, has not been hospitalized nor received treatment already, and is at risk for being very sick from COVID-19.     Treatment eligibility by RN:  Are you currently pregnant or nursing? No  Do you have a clinically significant hypersensitivity to nirmatrelvir or ritonavir, or toxic epidermal necrolysis (TEN) or Lindsay-Salvador Syndrome? No  Do you have a history of hepatitis, any hepatic impairment on the Problem List (such as Child-Vegas Class C, cirrhosis, fatty liver disease, alcoholic liver disease), or was the last liver lab (hepatic panel, ALT, AST, ALK Phos, bilirubin) elevated in the past 6 months? No  Do you have any history of severe renal impairment (eGFR < 30mL/min)? No    Is patient eligible to continue? Yes, patient meets  all eligibility requirements for the RN COVID treatment (as denoted by all no responses above).     Current Outpatient Medications   Medication Sig Dispense Refill    aspirin (ASA) 81 MG tablet Take 1 tablet (81 mg) by mouth daily 90 tablet 3    bisacodyl (DULCOLAX) 5 MG EC tablet Take 2 tablets at 3 pm the day before your procedure. If your procedure is before 11 am, take 2 additional tablets at 11 pm. If your procedure is after 11 am, take 2 additional tablets at 6 am. For additional instructions refer to your colonoscopy prep instructions. 4 tablet 0    ezetimibe-simvastatin (VYTORIN) 10-20 MG tablet Take 1 tablet by mouth At Bedtime 90 tablet 3    lisinopril (ZESTRIL) 10 MG tablet Take 1 tablet (10 mg) by mouth daily 90 tablet 3    metFORMIN (GLUCOPHAGE) 500 MG tablet Take 1 tablet (500 mg) by mouth daily (with breakfast) 90 tablet 3    metoprolol succinate ER (TOPROL XL) 25 MG 24 hr tablet Take 1 tablet (25 mg) by mouth daily 90 tablet 3    order for DME Equipment being ordered: continuous positive airway pressure machine with associated supplies.    Pressure settin = 8 1 each 0    polyethylene glycol (GOLYTELY) 236 g suspension The night before the exam at 6 pm drink an 8-ounce glass every 15 minutes until the jug is half empty. If you arrive before 11 AM: Drink the other half of the Golytely jug at 11 PM night before procedure. If you arrive after 11 AM: Drink the other half of the Golytely jug at 6 AM day of procedure. For additional instructions refer to your colonoscopy prep instructions. 4000 mL 0    sildenafil (REVATIO) 20 MG tablet 2 tabs daily as needed for erectile dysfunction. 60 tablet 2       Medications from List 1 of the standing order (on medications that exclude the use of Paxlovid) that patient is taking: NONE. Is patient taking Adalgisa's Wort? No  Is patient taking Adalgisa's Wort or any meds from List 1? No.   Medications from List 2 of the standing order (on meds that provider needs to  adjust) that patient is taking: NONE. Is patient on any of the meds from List 2? No.   Medications from List 3 of standing order (on meds that a RN needs to adjust) that patient is taking: sildenafil (Viagra, Revatio): Is patient using for erectile dysfunction? Yes, instructed patient to stop taking sildenafil while taking Paxlovid and restart sildenafil 3 days after the completion of Paxlovid.  simvastatin (Zocor, FloLipid): Instructed patient to stop taking simvastatin while taking Paxlovid and first dose of Paxlovid must be at least 12 hours after last dose of simvastatin.  Instructed to restart simvastatin 5 days after the completion of Paxlovid.  Is patient on any meds from List 3? Yes. Patient is on meds from list 3. No meds require a provider visit and at least one med required RN to adjust.     Paxlovid has an approximate 90% reduction in hospitalization. Paxlovid can possibly cause altered sense of taste, diarrhea (loose, watery stools), high blood pressure, muscle aches.     Would patient like a Paxlovid prescription?   Yes.   Lab Results   Component Value Date    GFRESTIMATED 86 08/03/2023       Was last eGFR reduced? No, eGFR 60 or greater/ No Result on record. Patient can receive the normal renal function dose. Paxlovid Rx sent to [place] [facility]   [facility]    Temporary change to home medications: See above.    All medication adjustments (holds, etc) were discussed with the patient and patient was asked to repeat back (teachback) their med adjustment.  Did patient understand med adjustment? Yes, patient repeated back and understood correctly.        Reviewed the following instructions with the patient:    Paxlovid (nimatrelvir and ritonavir)    How it works  Two medicines (nirmatrelvir and ritonavir) are taken together. They stop the virus from growing. Less amount of virus is easier for your body to fight.    How to take  Medicine comes in a daily container with both medicine tablets. Take by mouth twice  daily (once in the morning, once at night) for 5 days.  The number of tablets to take varies by patient.  Don't chew or break capsules. Swallow whole.    When to take  Take as soon as possible after positive COVID-19 test result, and within 5 days of your first symptoms.    Possible side effects  Can cause altered sense of taste, diarrhea (loose, watery stools), high blood pressure, muscle aches.    Heather Boo RN

## 2023-11-29 NOTE — TELEPHONE ENCOUNTER
Nurse Triage SBAR        Situation: Positive home Covid test    Background: pt reports Covid symptoms started 11/27 with muscle ache and sore throat. Today pt reports mild cough and sneezing.   Pt took the latest COVID booster 2 weeks ago.   Pt has history of heart disease.     Assessment: pt denies fever, SOB, difficulty breathing, or chest pain.     Protocol Recommended Disposition: Routing to COVID triage RN. Pt would like to be considered for Paxlovid treatment.     Reviewed care advice under care tab with pt.  Instructed pt to call back if new or worsening symptoms.    Patient was given an opportunity to ask questions, verbalized understanding of plan, and is agreeable.     Jayde Adrian RN            Does the patient meet one of the following criteria for ADS visit consideration? 16+ years old, with an [facility] PCP     TIP  Providers, please consider if this condition is appropriate for management at one of our Acute and Diagnostic Services sites.     If patient is a good candidate, please use dotphrase <dot>triageresponse and select Refer to ADS to document.                  Reason for Disposition   HIGH RISK patient (e.g., weak immune system, age > 64 years, obesity with BMI of 30 or higher, pregnant, chronic lung disease or other chronic medical condition) and COVID symptoms (e.g., cough, fever)  (Exceptions: Already seen by doctor or NP/PA and no new or worsening symptoms.)    Additional Information   Negative: SEVERE difficulty breathing (e.g., struggling for each breath, speaks in single words)   Negative: Difficult to awaken or acting confused (e.g., disoriented, slurred speech)   Negative: Bluish (or gray) lips or face now   Negative: Shock suspected (e.g., cold/pale/clammy skin, too weak to stand, low BP, rapid pulse)   Negative: Sounds like a life-threatening emergency to the triager   Negative: Diagnosed or suspected COVID-19 and symptoms lasting 3 or more weeks   Negative: COVID-19 exposure and no  "symptoms   Negative: COVID-19 vaccine reaction suspected (e.g., fever, headache, muscle aches) occurring 1 to 3 days after getting vaccine   Negative: COVID-19 vaccine, questions about   Negative: Lives with someone known to have influenza (flu test positive) and flu-like symptoms (e.g., cough, runny nose, sore throat, SOB; with or without fever)   Negative: Possible COVID-19 symptoms and triager concerned about severity of symptoms or other causes   Negative: COVID-19 and breastfeeding, questions about   Negative: SEVERE or constant chest pain or pressure  (Exception: Mild central chest pain, present only when coughing.)   Negative: MODERATE difficulty breathing (e.g., speaks in phrases, SOB even at rest, pulse 100-120)   Negative: Headache and stiff neck (can't touch chin to chest)   Negative: Oxygen level (e.g., pulse oximetry) 90% or lower   Negative: Chest pain or pressure  (Exception: MILD central chest pain, present only when coughing.)   Negative: Drinking very little and dehydration suspected (e.g., no urine > 12 hours, very dry mouth, very lightheaded)   Negative: Patient sounds very sick or weak to the triager   Negative: MILD difficulty breathing (e.g., minimal/no SOB at rest, SOB with walking, pulse <100)   Negative: Fever > 103 F (39.4 C)   Negative: Fever > 101 F (38.3 C) and over 60 years of age   Negative: Fever > 100.0 F (37.8 C) and bedridden (e.g., CVA, chronic illness, recovering from surgery)    Answer Assessment - Initial Assessment Questions  1. COVID-19 DIAGNOSIS: \"How do you know that you have COVID?\" (e.g., positive lab test or self-test, diagnosed by doctor or NP/PA, symptoms after exposure).      Home test: 11/29  2. COVID-19 EXPOSURE: \"Was there any known exposure to COVID before the symptoms began?\" CDC Definition of close contact: within 6 feet (2 meters) for a total of 15 minutes or more over a 24-hour period.       no  3. ONSET: \"When did the COVID-19 symptoms start?\"       " "11/27  4. WORST SYMPTOM: \"What is your worst symptom?\" (e.g., cough, fever, shortness of breath, muscle aches)      Sneezing  5. COUGH: \"Do you have a cough?\" If Yes, ask: \"How bad is the cough?\"        No   6. FEVER: \"Do you have a fever?\" If Yes, ask: \"What is your temperature, how was it measured, and when did it start?\"      Has not taken temp but doesn't think pt has fever  7. RESPIRATORY STATUS: \"Describe your breathing?\" (e.g., normal; shortness of breath, wheezing, unable to speak)       normal  8. BETTER-SAME-WORSE: \"Are you getting better, staying the same or getting worse compared to yesterday?\"  If getting worse, ask, \"In what way?\"      Monday pt felt body ache, had runny nose but has gotten better today.  9. OTHER SYMPTOMS: \"Do you have any other symptoms?\"  (e.g., chills, fatigue, headache, loss of smell or taste, muscle pain, sore throat)      Has muscle ache and sore throat on 11/27. Not today.   10. HIGH RISK DISEASE: \"Do you have any chronic medical problems?\" (e.g., asthma, heart or lung disease, weak immune system, obesity, etc.)        Had heart attack 12 years ago. Sees cardiology  11. VACCINE: \"Have you had the COVID-19 vaccine?\" If Yes, ask: \"Which one, how many shots, when did you get it?\"        Yes, also took latest one 2 weeks ago.   12. PREGNANCY: \"Is there any chance you are pregnant?\" \"When was your last menstrual period?\"        N/a  13. O2 SATURATION MONITOR:  \"Do you use an oxygen saturation monitor (pulse oximeter) at home?\" If Yes, ask \"What is your reading (oxygen level) today?\" \"What is your usual oxygen saturation reading?\" (e.g., 95%)        no    Protocols used: Coronavirus (COVID-19) Diagnosed or [id number]    "

## 2023-12-07 ENCOUNTER — OFFICE VISIT (OUTPATIENT)
Dept: PEDIATRICS | Facility: CLINIC | Age: 73
End: 2023-12-07
Attending: INTERNAL MEDICINE
Payer: COMMERCIAL

## 2023-12-07 VITALS
WEIGHT: 180.3 LBS | SYSTOLIC BLOOD PRESSURE: 132 MMHG | HEART RATE: 54 BPM | BODY MASS INDEX: 28.98 KG/M2 | TEMPERATURE: 97.7 F | RESPIRATION RATE: 19 BRPM | DIASTOLIC BLOOD PRESSURE: 76 MMHG | HEIGHT: 66 IN | OXYGEN SATURATION: 97 %

## 2023-12-07 DIAGNOSIS — N52.9 ERECTILE DYSFUNCTION, UNSPECIFIED ERECTILE DYSFUNCTION TYPE: ICD-10-CM

## 2023-12-07 DIAGNOSIS — E11.59 TYPE 2 DIABETES MELLITUS WITH OTHER CIRCULATORY COMPLICATION, WITHOUT LONG-TERM CURRENT USE OF INSULIN (H): Primary | ICD-10-CM

## 2023-12-07 DIAGNOSIS — Z12.11 SCREEN FOR COLON CANCER: ICD-10-CM

## 2023-12-07 DIAGNOSIS — Z87.891 HISTORY OF TOBACCO ABUSE: ICD-10-CM

## 2023-12-07 DIAGNOSIS — H90.3 SENSORINEURAL HEARING LOSS (SNHL) OF BOTH EARS: ICD-10-CM

## 2023-12-07 LAB — HBA1C MFR BLD: 6.4 % (ref 0–5.6)

## 2023-12-07 PROCEDURE — 83036 HEMOGLOBIN GLYCOSYLATED A1C: CPT | Performed by: INTERNAL MEDICINE

## 2023-12-07 PROCEDURE — 99207 PR FOOT EXAM NO CHARGE: CPT | Performed by: INTERNAL MEDICINE

## 2023-12-07 PROCEDURE — 99214 OFFICE O/P EST MOD 30 MIN: CPT | Mod: 25 | Performed by: INTERNAL MEDICINE

## 2023-12-07 PROCEDURE — 36415 COLL VENOUS BLD VENIPUNCTURE: CPT | Performed by: INTERNAL MEDICINE

## 2023-12-07 RX ORDER — SILDENAFIL CITRATE 20 MG/1
TABLET ORAL
Qty: 60 TABLET | Refills: 5 | Status: SHIPPED | OUTPATIENT
Start: 2023-12-07

## 2023-12-07 NOTE — PATIENT INSTRUCTIONS
"RSV vaccine at a pharmacy.    Colonoscopy in March.    Lab work today:  We can do labs in the exam room today, or you can get them done downstairs in the lab.      Other shots are up to date,    Refills up to date: sent sildenafil to [facility].     Call for a hearing evaluation.     Cardio exercise is probably the most helpful thing for your heart and future health.  Try to get about 30 minutes of sustained cardio exercise (biking, running, swimming, fast walking) every other day or even every day.  Keeping your heart rate at a \"target\" of (220 - your age) x 0.80 will be your goal.  For example, if you're 60 years old, this would be (220 - 60) x 0.80 = 128 beats per minute for those 30 minutes of exercise.     Try to limit complex carbs (rice, bread, pasta, potatoes) and simple carbs (pop, juice, alcohol.)  Eating more lean proteins (chicken, fish, eggs, beans, nuts) and unlimited vegetables and fruits can definitely help as well.     In general, try to spread meals out during the day, eating smaller breakfasts, lunches and dinners--and having healthy snacks in between.  It's a good idea to limit your carbs at mealtimes to 60-75 grams of carbs, and to limit your carbs at snacktimes to 15-30 grams of carbs.  (Check the food labels to add up these carbs.)     No CT of chest since you've quit for over 20 years.    But we'll order an aneurysm ultrasound; they will call you.    Kvng Smith MD  Internal Medicine and Pediatrics     "

## 2023-12-07 NOTE — PROGRESS NOTES
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  Continue secondary risk factor modification for BP, cholesterol, anticoagulation, and smoking cessation.     (Z12.11) Screen for colon cancer  Comment:   Plan: Colonoscopy Screening  Referral        Already scheduled for March.     (H90.3) Sensorineural hearing loss (SNHL) of both ears  Comment:   Plan: Adult Audiology  Referral        Referred for formal screening.     (N52.9) Erectile dysfunction, unspecified erectile dysfunction type  Comment:   Plan: sildenafil (REVATIO) 20 MG tablet        Refilled.     (Z87.891) History of tobacco abuse  Comment:   Plan: [place] Aorta Medicare AAA Screening        No chest CT indicated, but will screen for AAA.                       BMI:   Estimated body mass index is 29.55 kg/m  as calculated from the following:    Height as of this encounter: 1.664 m (5' 5.5\").    Weight as of this encounter: 81.8 kg (180 lb 4.8 oz).   Weight management plan: Patient was referred to their PCP to discuss a diet and exercise plan.        Kvng Smith MD  [facility] KURTIS Li is a 73 year old, presenting for the following health issues:      Diabetes    Weight is stable to improved.    Exercising, but not as regularly as he'd like.           12/7/2023     9:28 AM   Additional Questions   Roomed by Jackelin Chaudhari       History of Present Illness       Diabetes:   He presents for follow up of diabetes.    He is not checking blood glucose.         He has no concerns regarding his diabetes at this time.   He is not experiencing numbness or burning in feet, excessive thirst, blurry vision, weight changes or redness, sores or blisters on feet.                         Review of Systems   CONSTITUTIONAL: NEGATIVE for fever, " "chills, change in weight  INTEGUMENTARY/SKIN: NEGATIVE for worrisome rashes, moles or lesions  EYES: NEGATIVE for vision changes or irritation  ENT/MOUTH: NEGATIVE for ear, mouth and throat problems  RESP: NEGATIVE for significant cough or SOB  BREAST: NEGATIVE for masses, tenderness or discharge  CV: NEGATIVE for chest pain, palpitations or peripheral edema  GI: NEGATIVE for nausea, abdominal pain, heartburn, or change in bowel habits  : NEGATIVE for frequency, dysuria, or hematuria  MUSCULOSKELETAL: NEGATIVE for significant arthralgias or myalgia  NEURO: NEGATIVE for weakness, dizziness or paresthesias  ENDOCRINE: NEGATIVE for temperature intolerance, skin/hair changes  HEME: NEGATIVE for bleeding problems  PSYCHIATRIC: NEGATIVE for changes in mood or affect      Objective    /76 (BP Location: Right arm, Patient Position: Sitting, Cuff Size: Adult Regular)   Pulse 54   Temp 97.7  F (36.5  C) (Oral)   Resp 19   Ht 1.664 m (5' 5.5\")   Wt 81.8 kg (180 lb 4.8 oz)   SpO2 97%   BMI 29.55 kg/m    Body mass index is 29.55 kg/m .  Physical Exam   GENERAL: healthy, alert and no distress  EYES: Eyes grossly normal to inspection, PERRL and conjunctivae and sclerae normal  HENT: ear canals and TM's normal, nose and mouth without ulcers or lesions  NECK: no adenopathy, no asymmetry, masses, or scars and thyroid normal to palpation  RESP: lungs clear to auscultation - no rales, rhonchi or wheezes  CV: regular rate and rhythm, normal S1 S2, no S3 or S4, no murmur, click or rub, no peripheral edema and peripheral pulses strong  ABDOMEN: soft, nontender, no hepatosplenomegaly, no masses and bowel sounds normal  MS: no gross musculoskeletal defects noted, no edema  SKIN: no suspicious lesions or rashes  NEURO: Normal strength and tone, mentation intact and speech normal  PSYCH: mentation appears normal, affect normal/bright    Diabetic Foot Exam:  normal DP and PT pulses, no trophic changes or ulcerative lesions, and " normal sensory exam

## 2023-12-19 ENCOUNTER — TELEPHONE (OUTPATIENT)
Dept: PEDIATRICS | Facility: CLINIC | Age: 73
End: 2023-12-19
Payer: COMMERCIAL

## 2023-12-19 NOTE — TELEPHONE ENCOUNTER
Received call from patient regarding question on metoprolol script. Patient states he never received lower dose rx from cardiologist. Per chart review, script for metoprolol succinate ER (TOPROL XL) 25 MG 24 hr tablet was sent to [facility] by Cardiologist Dr. Fountain 09/13/23. Pharmacy confirmed receiving electronically. Patient states he never received this script. RN advised patient to contact [facility] for his rx and then contact Cardiology team if there are further issues. Patient agreed with plan.     Sean ASHFORD RN 12/19/2023 at 11:44 AM

## 2023-12-22 DIAGNOSIS — I25.10 CORONARY ARTERY DISEASE INVOLVING NATIVE HEART WITHOUT ANGINA PECTORIS, UNSPECIFIED VESSEL OR LESION TYPE: ICD-10-CM

## 2023-12-22 DIAGNOSIS — I25.10 CORONARY ARTERY DISEASE INVOLVING NATIVE CORONARY ARTERY OF NATIVE HEART WITHOUT ANGINA PECTORIS: ICD-10-CM

## 2023-12-22 RX ORDER — METOPROLOL SUCCINATE 25 MG/1
25 TABLET, EXTENDED RELEASE ORAL DAILY
Qty: 90 TABLET | Refills: 3 | Status: SHIPPED | OUTPATIENT
Start: 2023-12-22 | End: 2024-04-08

## 2024-02-02 NOTE — PROGRESS NOTES
AUDIOLOGY REPORT    SUBJECTIVE:  Guillermo Ford is a 73 year old male who was seen in the Audiology Clinic at the [facility] for audiologic evaluation, referred by Kvng Smith M.D. They were accompanied today by their self. Guillermo states his wife has encouraged him to get his hearing tested. He does not have hearing concerns. He denied otalgia, otorrhea, dizziness, tinnitus, aural fullness, history of ear surgery, loud noise exposure, and previous hearing aid use.    OBJECTIVE:  Abuse Screening:  Do you feel unsafe at home or work/school? No  Do you feel threatened by someone? No  Does anyone try to keep you from having contact with others, or doing things outside of your home? No  Physical signs of abuse present? No     Fall Risk Screen:  1. Have you fallen two or more times in the past year? No  2. Have you fallen and had an injury in the past year? No    Timed Up and Go Score (in seconds): not tested  Is patient a fall risk? No  Referral initiated: No  Fall Risk Assessment Completed by Audiology    Otoscopic exam indicates ears are clear of cerumen bilaterally     Pure Tone Thresholds assessed using conventional audiometry with good reliability from 250-8000 Hz bilaterally using insert earphones and circumaural headphones.     RIGHT:  Normal hearing sloping to a mild sensorineural hearing loss     LEFT:  Normal hearing sloping to a mild sensorineural hearing loss bilaterally. 15 dB air bone gap noted at 4000 Hz.      Tympanogram:    RIGHT: Normal eardrum mobility    LEFT:   Normal eardrum mobility    Reflexes (reported by stimulus ear):  RIGHT: Ipsilateral is absent at frequencies tested  RIGHT: Contralateral is absent at frequencies tested  LEFT:   Ipsilateral is absent at frequencies tested  LEFT:   Contralateral is absent at frequencies tested    Speech Reception Threshold:    RIGHT: 15 dB HL    LEFT:   15 dB HL    Word Recognition Score:     RIGHT: 100% at 60 dB HL using NU-6 recorded  word list.    LEFT:   100% at 60 dB HL using NU-6 recorded word list.    ASSESSMENT:   Today's results reveal normal hearing sloping to a mild sensorineural hearing loss bilaterally. Today s results were discussed with the patient in detail.     PLAN:   Repeat hearing test in 2-3 years, sooner if new concerns arise. Please call this clinic with questions regarding these results or recommendations.    Khris Lindquist., CCC-A  [place] Licensed Audiologist #[id number]

## 2024-02-08 ENCOUNTER — OFFICE VISIT (OUTPATIENT)
Dept: AUDIOLOGY | Facility: CLINIC | Age: 74
End: 2024-02-08
Payer: COMMERCIAL

## 2024-02-08 DIAGNOSIS — H90.3 SENSORINEURAL HEARING LOSS (SNHL) OF BOTH EARS: ICD-10-CM

## 2024-02-08 PROCEDURE — 92550 TYMPANOMETRY & REFLEX THRESH: CPT | Performed by: AUDIOLOGIST

## 2024-02-08 PROCEDURE — 92557 COMPREHENSIVE HEARING TEST: CPT | Performed by: AUDIOLOGIST

## 2024-02-28 ENCOUNTER — TELEPHONE (OUTPATIENT)
Dept: GASTROENTEROLOGY | Facility: CLINIC | Age: 74
End: 2024-02-28

## 2024-02-28 DIAGNOSIS — Z12.11 ENCOUNTER FOR SCREENING COLONOSCOPY: Primary | ICD-10-CM

## 2024-02-28 RX ORDER — BISACODYL 5 MG/1
TABLET, DELAYED RELEASE ORAL
Qty: 4 TABLET | Refills: 0 | Status: SHIPPED | OUTPATIENT
Start: 2024-02-28 | End: 2024-04-08

## 2024-02-28 NOTE — TELEPHONE ENCOUNTER
Pre visit planning completed.      Procedure details:    Patient scheduled for Colonoscopy  on 3/12/24.     Arrival time: 1130. Procedure time 1215    Pre op exam needed? N/A    Facility location: [facility]; [address] [place] [address]    Sedation type: Conscious sedation     Indication for procedure: screening       Chart review:     Electronic implanted devices? No    Recent diagnosis of diverticulitis within the last 6 weeks? No    Diabetic? Yes. See medication holding recommendations.     Diabetic medication HOLDING recommendations: (if applicable)  Oral diabetic medications: Yes:  Metformin (glucophage): HOLD day of procedure.  Diabetic injectables: No  Insulin: No      Medication review:    Anticoagulants? No    NSAIDS? No    Other medication HOLDING recommendations:  N/A      Prep for procedure:     Bowel prep recommendation: Standard Golytely    Due to:  diabetes.     Prep instructions sent via "[employer]" - updated as standard miralax sent during scheduling. Bowel prep script sent to    [id number] - [place], [place] - [address]          Marine Forrester RN  Endoscopy Procedure Pre Assessment RN  [phone] option 4

## 2024-02-29 RX ORDER — ONDANSETRON 4 MG/1
TABLET, FILM COATED ORAL
Qty: 3 TABLET | Refills: 0 | Status: CANCELLED | OUTPATIENT
Start: 2024-02-29

## 2024-02-29 NOTE — TELEPHONE ENCOUNTER
Verbally called in prescription for Golytely to [facility][id number] - [place], [place] - [address] [facility] Phone: [phone]     Shavon Palma RN  Endoscopy Procedure Pre Assessment RN  [phone] option 4

## 2024-02-29 NOTE — TELEPHONE ENCOUNTER
Pre assessment completed for upcoming procedure.   (Please see previous telephone encounter notes for complete details)    Patient  returned call.       Procedure details:    Arrival time and facility location reviewed.    Pre op exam needed? N/A    Designated  policy reviewed. Instructed to have someone stay 6  hours post procedure.       Medication review:    Medications reviewed. Please see supporting documentation below. Holding recommendations discussed (if applicable).       Prep for procedure:     Procedure prep instructions reviewed.        Any additional information needed:  N/A      Patient  verbalized understanding and had no questions or concerns at this time.      Alondra Vick RN  Endoscopy Procedure Pre Assessment RN  [phone] option 4

## 2024-02-29 NOTE — TELEPHONE ENCOUNTER
Attempted to contact patient in order to complete pre assessment questions. Pre assessment completed below.     No answer. Left message to return call to [phone] option 4. MyChart message sent.        Shavon Palma RN  Endoscopy Procedure Pre Assessment RN

## 2024-03-12 ENCOUNTER — HOSPITAL ENCOUNTER (OUTPATIENT)
Facility: CLINIC | Age: 74
Discharge: HOME | End: 2024-03-12
Attending: INTERNAL MEDICINE | Admitting: INTERNAL MEDICINE
Payer: COMMERCIAL

## 2024-03-12 VITALS
BODY MASS INDEX: 29.99 KG/M2 | HEIGHT: 65 IN | RESPIRATION RATE: 16 BRPM | OXYGEN SATURATION: 94 % | HEART RATE: 82 BPM | WEIGHT: 180 LBS | DIASTOLIC BLOOD PRESSURE: 77 MMHG | SYSTOLIC BLOOD PRESSURE: 114 MMHG

## 2024-03-12 DIAGNOSIS — Z12.11 SCREENING FOR MALIGNANT NEOPLASM OF COLON: Primary | ICD-10-CM

## 2024-03-12 LAB — COLONOSCOPY: NORMAL

## 2024-03-12 PROCEDURE — 45378 DIAGNOSTIC COLONOSCOPY: CPT | Performed by: INTERNAL MEDICINE

## 2024-03-12 PROCEDURE — 250N000011 HC RX IP 250 OP 636: Performed by: INTERNAL MEDICINE

## 2024-03-12 PROCEDURE — G0500 MOD SEDAT ENDO SERVICE >5YRS: HCPCS | Performed by: INTERNAL MEDICINE

## 2024-03-12 PROCEDURE — G0121 COLON CA SCRN NOT HI RSK IND: HCPCS | Performed by: INTERNAL MEDICINE

## 2024-03-12 RX ORDER — FLUMAZENIL 0.1 MG/ML
0.2 INJECTION, SOLUTION INTRAVENOUS
Status: DISCONTINUED | OUTPATIENT
Start: 2024-03-12 | End: 2024-03-12 | Stop reason: HOSPADM

## 2024-03-12 RX ORDER — ATROPINE SULFATE 0.1 MG/ML
1 INJECTION INTRAVENOUS
Status: DISCONTINUED | OUTPATIENT
Start: 2024-03-12 | End: 2024-03-12 | Stop reason: HOSPADM

## 2024-03-12 RX ORDER — ONDANSETRON 2 MG/ML
4 INJECTION INTRAMUSCULAR; INTRAVENOUS EVERY 6 HOURS PRN
Status: DISCONTINUED | OUTPATIENT
Start: 2024-03-12 | End: 2024-03-12 | Stop reason: HOSPADM

## 2024-03-12 RX ORDER — SIMETHICONE 40MG/0.6ML
133 SUSPENSION, DROPS(FINAL DOSAGE FORM)(ML) ORAL
Status: DISCONTINUED | OUTPATIENT
Start: 2024-03-12 | End: 2024-03-12 | Stop reason: HOSPADM

## 2024-03-12 RX ORDER — ONDANSETRON 2 MG/ML
4 INJECTION INTRAMUSCULAR; INTRAVENOUS
Status: DISCONTINUED | OUTPATIENT
Start: 2024-03-12 | End: 2024-03-12 | Stop reason: HOSPADM

## 2024-03-12 RX ORDER — EPINEPHRINE 1 MG/ML
0.1 INJECTION, SOLUTION INTRAMUSCULAR; SUBCUTANEOUS
Status: DISCONTINUED | OUTPATIENT
Start: 2024-03-12 | End: 2024-03-12 | Stop reason: HOSPADM

## 2024-03-12 RX ORDER — PROCHLORPERAZINE MALEATE 5 MG
5 TABLET ORAL EVERY 6 HOURS PRN
Status: DISCONTINUED | OUTPATIENT
Start: 2024-03-12 | End: 2024-03-12 | Stop reason: HOSPADM

## 2024-03-12 RX ORDER — NALOXONE HYDROCHLORIDE 0.4 MG/ML
0.2 INJECTION, SOLUTION INTRAMUSCULAR; INTRAVENOUS; SUBCUTANEOUS
Status: DISCONTINUED | OUTPATIENT
Start: 2024-03-12 | End: 2024-03-12 | Stop reason: HOSPADM

## 2024-03-12 RX ORDER — ONDANSETRON 4 MG/1
4 TABLET, ORALLY DISINTEGRATING ORAL EVERY 6 HOURS PRN
Status: DISCONTINUED | OUTPATIENT
Start: 2024-03-12 | End: 2024-03-12 | Stop reason: HOSPADM

## 2024-03-12 RX ORDER — DIPHENHYDRAMINE HYDROCHLORIDE 50 MG/ML
25-50 INJECTION INTRAMUSCULAR; INTRAVENOUS
Status: DISCONTINUED | OUTPATIENT
Start: 2024-03-12 | End: 2024-03-12 | Stop reason: HOSPADM

## 2024-03-12 RX ORDER — FENTANYL CITRATE 50 UG/ML
50-100 INJECTION, SOLUTION INTRAMUSCULAR; INTRAVENOUS EVERY 5 MIN PRN
Status: DISCONTINUED | OUTPATIENT
Start: 2024-03-12 | End: 2024-03-12 | Stop reason: HOSPADM

## 2024-03-12 RX ORDER — NALOXONE HYDROCHLORIDE 0.4 MG/ML
0.4 INJECTION, SOLUTION INTRAMUSCULAR; INTRAVENOUS; SUBCUTANEOUS
Status: DISCONTINUED | OUTPATIENT
Start: 2024-03-12 | End: 2024-03-12 | Stop reason: HOSPADM

## 2024-03-12 RX ORDER — LIDOCAINE 40 MG/G
CREAM TOPICAL
Status: DISCONTINUED | OUTPATIENT
Start: 2024-03-12 | End: 2024-03-12 | Stop reason: HOSPADM

## 2024-03-12 RX ADMIN — MIDAZOLAM 1 MG: 1 INJECTION INTRAMUSCULAR; INTRAVENOUS at 12:07

## 2024-03-12 RX ADMIN — FENTANYL CITRATE 50 MCG: 0.05 INJECTION, SOLUTION INTRAMUSCULAR; INTRAVENOUS at 12:13

## 2024-03-12 RX ADMIN — MIDAZOLAM 1 MG: 1 INJECTION INTRAMUSCULAR; INTRAVENOUS at 12:13

## 2024-03-12 RX ADMIN — FENTANYL CITRATE 50 MCG: 0.05 INJECTION, SOLUTION INTRAMUSCULAR; INTRAVENOUS at 12:07

## 2024-03-12 ASSESSMENT — ACTIVITIES OF DAILY LIVING (ADL)
ADLS_ACUITY_SCORE: 35
ADLS_ACUITY_SCORE: 35

## 2024-03-12 NOTE — LETTER
[facility] ENDOSCOPY [place]    [address] [facility] [address]  Phone: [phone]  Fax: [phone]           July 20, 2023                      Guillermo Ford  [address] [place] [address]          Dear Guillermo Ford,        Thank you for choosing [facility]. You are scheduled for the following service(s).   Please be aware that coverage of these services is subject to the terms and limitations of your health insurance plan.  Call member services at your health plan with any benefit or coverage questions.    Scheduled Endoscopy Procedure(s): Colonoscopy     Date: 7/25/23  Scheduled MD: Dr. Nate Griffith          Arrival Time:   06:45 am  *Enter and check in at the Main Hospital Entrance  Procedure Time:  07:15 am       Location:   [facility]        Endoscopy Department, First Floor *         [address] [place], [place] [address] [phone] () or [phone] to reschedule                     MiraLAX Bowel Prep WITHOUT Magnesium Citrate  Prep Instructions for your Colonoscopy  Pre-Assessment Phone Number: [phone]      Please read these instructions carefully at least 7 days prior to your colonoscopy procedure. Be sure to follow all directions completely. The inside of your colon must be clean to allow for a complete examination for the presence of any growths, polyps, and/or abnormalities, as well as their biopsy or removal. A number of tips are included in order to make this part of the procedure as comfortable as possible.    Getting ready:   You will receive a call from a Nurse to review instructions and health history.  This assessment must be completed prior to your procedure.  Failure to complete the Nurse assessment may result in the procedure being cancelled.  You must arrange for someone to drive you home after your exam. Your colonoscopy cannot be done unless you have a ride. If you need  to use public transportation, a responsible caregiver must ride with you and stay with you for a minimum of 24 hours.  Check with your insurance company to be sure they will cover this exam.  Go to the drug store and buy:  Four (4) Dulcolax (Bisacodyl) tablets   2 - 8.3 ounce bottles of Miralax   80 ounces of Gatorade (not red or purple)       7 days before the exam:  Talk to your prescribing provider: If you take blood thinners (such as Coumadin, Plavix, Xarelto, Eliquis, Lovenox, or others), these medications may need to be stopped temporarily before your procedure. Your prescribing provider will tell you what to do.   Talk to your prescribing provider: If you take prescription NSAIDS (such as Sulindac, Celebrex, Mobic, Relafen). Your prescribing provider will tell you what to do.   Stop taking fiber and iron supplements   Stop eating corn, popcorn, nuts and foods that contain seeds. These can stay in the colon for many days and they can clog up the colonoscope.     3 days before the exam:  Begin a low-fiber diet (see below).   Drink at least 4 to 6 large glasses of water or sports drink each day.     One day before the exam:  Only clear liquid diet is allowed (see below). No solid food should be eaten.   Drink at least 8 to 10 full glasses of clear liquids during the day (no red or purple).   Stop taking NSAID pain relievers, such as Advil, Ibuprofen, Motrin, etc.  You may take Tylenol.  The colonoscopy prep is taken in multiple steps. Note that the timing of the final step depends on your arrival time for the examination.   Once you start drinking the solution, stay near a toilet while using this medicine.   Besides diarrhea (watery stools), you may have mild cramping, bloating and nausea.    You may want to use Vaseline on the skin around your anus after each bowel movement to prevent irritation. You can also use wet wipes to prevent irritation.     Bowel cleansing:  At 12 pm, take 2 Dulcolax (Bisacodyl)  tablets. This may take a minimum of approximately 6 hours to begin working.  At 4 pm, mix one entire bottle of Miralax with 64 ounces of Gatorade in a pitcher and stir to dissolve the powder. Chill if desired, but do not add ice.  At 5 pm, drink an 8-ounce glass of the Miralax and Gatorade mixture every 15 minutes until the pitcher is half empty (about 4 glasses). Store the rest in the fridge. You must drink each glass quickly.  Bowel movements usually begin about one hour after drinking the mixture. The stool is likely to be brown at this stage.  Continue to drink clear liquids.  At 10 pm, take 2 Dulcolax (Bisacodyl) tablets and start drinking the remaining half of the pitcher. Drink one 8-ounce glass of the Miralax and Gatorade mixture every 15 minutes until the pitcher is empty (about 4 glasses). Drink each glass quickly.    Day of the exam:  Mix 4 capfuls of Miralax with 16 ounces of Gatorade and stir to dissolve the powder.  4 hours before your arrival time, drink 16 ounces of the Miralax and Gatorade mixture (an 8-ounce glass every 15 minutes). Drink each glass quickly.  You may take your necessary morning medications with sips of water.  Do not take diabetes medicine by mouth until after your exam.  You may drink clear liquids only up until 2 hours before your arrival time.  Do not smoke, chew tobacco, or swallow anything, including water or gum for at least 2 hours before your arrival time. This is a safety issue. Your procedure could be cancelled if you do not follow directions.  Please do not wear jewelry (i.e. earrings, rings, necklaces, watches, etc) . Leave your purse, billfold, credit cards, and other valuables at home.   Please arrive with a responsible caregiver who can take you home after the test and stay with you for 24 hours. The sedation medicine used will make you sleepy and forgetful. If you do not have someone to take you home, we will cancel your procedure. If using public transportation,  you must have someone to ride with you.  Please perform your nebulizer treatments and airway clearance therapy in the morning prior to the procedure (if applicable).  If you have asthma, bring your inhalers.       CLEAR LIQUIDS   You may have:    Water, tea, coffee (no milk or cream)  Soda pop, Gatorade (not red or purple)  Jell-O, Popsicles (no milk or fruit pieces - not red or purple)  Fat-free soup broth or bouillon  Plain hard candy, such as clear life savers (not red or purple)  Clear juices and fruit-flavored drinks, such as apple juice, white grape juice, Hi-C, and Andrea-Aid (not red or purple)   Do not have:    Milk or milk products such as ice cream, malts or shakes, or coffee creamer  Red or purple drinks of any kind such as cranberry juice or grape juice. Avoid red or purple Jell-O, Popsicles, Andrea-Aid, sorbet, sherbet and candy  Juices with pulp such as orange, grapefruit, pineapple or tomato juice  Cream soups of any kind  Alcohol and beer  Protein drinks or protein powder     LOW FIBER DIET   You may have:    Starches: White bread, rolls, biscuits, croissants, Garfield toast, white flour tortillas, waffles, pancakes, Scottish toast; white rice, noodles, pasta, macaroni; cooked and peeled potatoes; plain crackers, saltines; cooked farina or cream of rice; puffed rice, corn flakes, Rice Krispies, Special K   Vegetables: tender cooked and canned, vegetable broths  Fruits and fruit juices: Strained fruit juice, canned fruit without seeds or skin (not pineapple), applesauce, pear sauce, ripe bananas, melons (not watermelon)   Milk products: Milk (plain or flavored), cheese, cottage cheese, yogurt (no berries), custard, ice cream    Proteins: Tender, well-cooked ground beef, lamb, veal, ham, pork, chicken, turkey, fish or organ meats; eggs; creamy peanut butter   Fats and condiments:  Margarine, butter, oils, mayonnaise, sour cream, salad dressing, plain gravy; spices, cooked herbs; sugar, clear jelly, honey,  syrup   Snacks, sweets and drinks: Pretzels, hard candy; plain cakes and cookies (no nuts or seeds); gelatin, plain pudding, sherbet, Popsicles; coffee, tea, carbonated ( fizzy ) drinks Do not have:    Starches: Breads or rolls that contain nuts, seeds or fruit; whole wheat or whole grain breads that contain more than 1 gram of fiber per slice; cornbread; corn or whole wheat tortillas; potatoes with skin; brown rice, wild rice, kasha (buckwheat), and oatmeal   Vegetables: Any raw or steamed vegetables; vegetables with seeds; corn in any form   Fruits and fruit juices: Prunes, prune juice, raisins and other dried fruits, berries and other fruits with seeds, canned pineapple juices with pulp such as orange, grapefruit, pineapple or tomato juice  Milk products: Any yogurt with nuts, seeds or berries   Proteins: Tough, fibrous meats with gristle; cooked dried beans, peas or lentils; crunchy peanut butter  Fats and condiments: Pickles, olives, relish, horseradish; jam, marmalade, preserves   Snacks, sweets and drinks: Popcorn, nuts, seeds, granola, coconut, candies made with nuts or seeds; all desserts that contain nuts, seeds, raisins and other dried fruits, coconut, whole grains or bran.      FAQ:    Why should Miralax be mixed with Gatorade or another clear sports drink?   It is important that your body does not develop an imbalance of electrolytes with the large volume of fluid in this prep. Gatorade/sports drinks contains those electrolytes.   Does Miralax have to be mixed with Gatorade?  Gatorade, Powerade, or any of the other sports drinks are acceptable. If you are diabetic, it is acceptable to use the low sugar options, such as Gatorade Zero, Powerade Zero, G2, etc.  Can I put ice in the Gatorade/Miralax mixture?  We prefer that you mix it and put it in the refrigerator to chill instead of using ice. The ice will melt and dilute the laxative.    Why should the Miralax prep be taken in several steps?   The stool  is flushed out by a large wave of fluid going through the colon. Just sipping a large volume of the solution will not achieve the desired result. Studies have shown that two smaller waves (or more in some cases) are better than one large one.    Why is the last Miralax dose so close to the exam?   The intestine continues to produce mucus and waste. Longer intervals between the prep and the exam can lead to less than desired results. However, the stomach must be empty at the time of the exam in order to allow safe sedation. Therefore, there should be nothing by mouth 2 hours before the exam is started.   How do you know if your colon is cleaned out?   After completing the bowel prep, your bowel movements should be all liquid and yellow. Your bowel movements will look similar to urine in the toilet. If there are pieces of stool (poop) in the toilet, or if you can't see to the bottom of the toilet, please call our office for advice. Call [phone] and ask to speak with a nurse.   Why do you need a responsible  to take you home and stay with you?  We require a responsible caregiver to take you home for your safety. The sedation medicines used to relax you during the procedure can impair your judgement and reaction time, and make you forgetful and possibly a little unsteady. Do not drive, make any important decisions, or sign any legal documents for 24 hours after your procedure.   It is normal to feel bloated and gassy after your procedure. Walking will help move the air through your colon. You can take non-aspirin pain relievers that contain acetaminophen (Tylenol).   When can you eat after your procedure?  You may resume your normal diet when you feel ready, unless advised otherwise by the doctor performing your procedure. Do not drink alcohol for 24 hours after your procedure.   You many resume normal activities (work, exercise, etc.) after 24 hours.   When will you get test results?  You should have your  procedure results and any lab results (if applicable) by letter, MyChart message, or phone call within 2 weeks. If you have any questions, please call the doctor that referred you for the procedure.       Last Updated: 6/8/2023

## 2024-03-12 NOTE — H&P
"Pre-Endoscopy History and Physical     Guillermo Ford MRN# [id number]   YOB: 1950 Age: 73 year old     Date of Procedure: 3/12/2024  Primary care provider: Kvng Smith  Type of Endoscopy: Colonoscopy with possible biopsy, possible polypectomy  Reason for Procedure: screen  Type of Anesthesia Anticipated: Conscious Sedation    HPI:    Guillermo is a 73 year old male who will be undergoing the above procedure.      A history and physical has been performed. The patient's medications and allergies have been reviewed. The risks and benefits of the procedure and the sedation options and risks were discussed with the patient.  All questions were answered and informed consent was obtained.      He denies a personal or family history of anesthesia complications or bleeding disorders.     Patient Active Problem List   Diagnosis    Lumbago    Gout    Diverticulitis of colon    Lumbar spinal stenosis    Coronary artery disease involving native coronary artery of native heart without angina pectoris    Mixed hyperlipidemia    Sleep apnea    GERD (gastroesophageal reflux disease)    Impaired fasting glucose    History of acute inferior wall MI    Left ventricular diastolic dysfunction    Benign essential hypertension    Type 2 diabetes mellitus with other circulatory complication, without long-term current use of insulin (H)        Past Medical History:   Diagnosis Date    Alcohol Dependence in Remission     sober since 1996    CAD (coronary artery disease)     cardiac cath 2005: stent to PDA    Diverticulitis of colon 6/9/2008    Diagnosed after diarrheal episode from colchicine; \"Probable\" on CT scan 6/08.    GERD (gastroesophageal reflux disease)     Gout     History of acute inferior wall MI     2005    Left ventricular diastolic dysfunction     Mixed hyperlipidemia     Sleep apnea     C PAP        Past Surgical History:   Procedure Laterality Date    [facility] REPAIR OF NASAL SEPTUM      Septoplasty and uvulectomy    " [facility] CORONARY STENT PERCUT, INITIAL VESSEL  2005    PTCA with intracoronary stent placement of, distal RCA and ostial PDA       Social History     Tobacco Use    Smoking status: Former     Types: Cigarettes     Quit date: 2000     Years since quittin.8    Smokeless tobacco: Never    Tobacco comments:     previous 2 ppd smoker   Substance Use Topics    Alcohol use: Not Currently       Family History   Problem Relation Age of Onset    C.A.D. Mother     C.A.D. Father     Diabetes Brother         unsure of type 1/2    Family History Negative Sister     Family History Negative Son     Family History Negative Daughter     Family History Negative Brother     Cancer - colorectal No family hx of     Prostate Cancer No family hx of        Prior to Admission medications    Medication Sig Start Date End Date Taking? Authorizing Provider   aspirin (ASA) 81 MG tablet Take 1 tablet (81 mg) by mouth daily 20  Yes Patrice Fountain MD   ezetimibe-simvastatin (VYTORIN) 10-20 MG tablet Take 1 tablet by mouth At Bedtime 23  Yes Kvng Smith MD   lisinopril (ZESTRIL) 10 MG tablet Take 1 tablet (10 mg) by mouth daily 23  Yes Kvng Smith MD   metFORMIN (GLUCOPHAGE) 500 MG tablet Take 1 tablet (500 mg) by mouth daily (with breakfast) 23  Yes Kvng Smith MD   metoprolol succinate ER (TOPROL XL) 25 MG 24 hr tablet Take 1 tablet (25 mg) by mouth daily 23  Yes Patrice Fountain MD   sildenafil (REVATIO) 20 MG tablet 2 tabs daily as needed for erectile dysfunction. 23  Yes Kvng Smith MD   bisacodyl (DULCOLAX) 5 MG EC tablet Take 2 tablets at 3 pm the day before your procedure. If your procedure is before 11 am, take 2 additional tablets at 11 pm. If your procedure is after 11 am, take 2 additional tablets at 6 am. For additional instructions refer to your colonoscopy prep instructions. 24   Nate Griffith MD   order for DME Equipment being ordered: continuous positive airway pressure  "machine with associated supplies.    Pressure settin = 8 2/18/20   Kvng Smith MD   polyethylene glycol (GOLYTELY) 236 g suspension The night before the exam at 6 pm drink an 8-ounce glass every 15 minutes until the jug is half empty. If you arrive before 11 AM: Drink the other half of the Golytely jug at 11 PM night before procedure. If you arrive after 11 AM: Drink the other half of the Golytely jug at 6 AM day of procedure. For additional instructions refer to your colonoscopy prep instructions. 2/28/24   Nate Griffith MD       No Known Allergies     REVIEW OF SYSTEMS:   5 point ROS negative except as noted above in HPI, including Gen., Resp., CV, GI &  system review.    PHYSICAL EXAM:   BP (!) 142/83   Pulse 84   Resp (!) 8   Ht 1.651 m (5' 5\")   Wt 81.6 kg (180 lb)   SpO2 96%   BMI 29.95 kg/m   Estimated body mass index is 29.95 kg/m  as calculated from the following:    Height as of this encounter: 1.651 m (5' 5\").    Weight as of this encounter: 81.6 kg (180 lb).   GENERAL APPEARANCE: alert, and oriented  MENTAL STATUS: alert  AIRWAY EXAM: Mallampatti Class I (visualization of the soft palate, fauces, uvula, anterior and posterior pillars)  RESP: lungs clear to auscultation - no rales, rhonchi or wheezes  CV: regular rates and rhythm  DIAGNOSTICS:    Not indicated    IMPRESSION   ASA Class 2 - Mild systemic disease    PLAN:   Plan for Colonoscopy with possible biopsy, possible polypectomy. We discussed the risks, benefits and alternatives and the patient wished to proceed.    The above has been forwarded to the consulting provider.      Signed Electronically by: Nate Griffith MD  March 12, 2024          "

## 2024-03-17 ENCOUNTER — HEALTH MAINTENANCE LETTER (OUTPATIENT)
Age: 74
End: 2024-03-17

## 2024-03-25 ENCOUNTER — TRANSFERRED RECORDS (OUTPATIENT)
Dept: HEALTH INFORMATION MANAGEMENT | Facility: CLINIC | Age: 74
End: 2024-03-25
Payer: COMMERCIAL

## 2024-03-25 LAB — RETINOPATHY: NEGATIVE

## 2024-04-07 SDOH — HEALTH STABILITY: PHYSICAL HEALTH: ON AVERAGE, HOW MANY DAYS PER WEEK DO YOU ENGAGE IN MODERATE TO STRENUOUS EXERCISE (LIKE A BRISK WALK)?: 1 DAY

## 2024-04-07 SDOH — HEALTH STABILITY: PHYSICAL HEALTH: ON AVERAGE, HOW MANY MINUTES DO YOU ENGAGE IN EXERCISE AT THIS LEVEL?: 30 MIN

## 2024-04-07 ASSESSMENT — SOCIAL DETERMINANTS OF HEALTH (SDOH): HOW OFTEN DO YOU GET TOGETHER WITH FRIENDS OR RELATIVES?: ONCE A WEEK

## 2024-04-08 ENCOUNTER — OFFICE VISIT (OUTPATIENT)
Dept: PEDIATRICS | Facility: CLINIC | Age: 74
End: 2024-04-08
Attending: INTERNAL MEDICINE
Payer: COMMERCIAL

## 2024-04-08 VITALS
WEIGHT: 185.7 LBS | SYSTOLIC BLOOD PRESSURE: 130 MMHG | RESPIRATION RATE: 18 BRPM | HEIGHT: 64 IN | HEART RATE: 71 BPM | OXYGEN SATURATION: 97 % | DIASTOLIC BLOOD PRESSURE: 80 MMHG | TEMPERATURE: 97.5 F | BODY MASS INDEX: 31.7 KG/M2

## 2024-04-08 DIAGNOSIS — E11.59 TYPE 2 DIABETES MELLITUS WITH OTHER CIRCULATORY COMPLICATION, WITHOUT LONG-TERM CURRENT USE OF INSULIN (H): ICD-10-CM

## 2024-04-08 DIAGNOSIS — M10.9 GOUT, UNSPECIFIED CAUSE, UNSPECIFIED CHRONICITY, UNSPECIFIED SITE: ICD-10-CM

## 2024-04-08 DIAGNOSIS — Z00.00 ENCOUNTER FOR MEDICARE ANNUAL WELLNESS EXAM: Primary | ICD-10-CM

## 2024-04-08 DIAGNOSIS — M70.61 TROCHANTERIC BURSITIS OF RIGHT HIP: ICD-10-CM

## 2024-04-08 DIAGNOSIS — R73.01 IMPAIRED FASTING GLUCOSE: ICD-10-CM

## 2024-04-08 DIAGNOSIS — E78.2 MIXED HYPERLIPIDEMIA: ICD-10-CM

## 2024-04-08 DIAGNOSIS — I10 BENIGN ESSENTIAL HYPERTENSION: ICD-10-CM

## 2024-04-08 DIAGNOSIS — I25.10 CORONARY ARTERY DISEASE INVOLVING NATIVE CORONARY ARTERY OF NATIVE HEART WITHOUT ANGINA PECTORIS: ICD-10-CM

## 2024-04-08 DIAGNOSIS — I25.10 CORONARY ARTERY DISEASE INVOLVING NATIVE HEART WITHOUT ANGINA PECTORIS, UNSPECIFIED VESSEL OR LESION TYPE: ICD-10-CM

## 2024-04-08 LAB
ALBUMIN SERPL BCG-MCNC: 4.1 G/DL (ref 3.5–5.2)
ALP SERPL-CCNC: 60 U/L (ref 40–150)
ALT SERPL W P-5'-P-CCNC: 25 U/L (ref 0–70)
ANION GAP SERPL CALCULATED.3IONS-SCNC: 12 MMOL/L (ref 7–15)
AST SERPL W P-5'-P-CCNC: 18 U/L (ref 0–45)
BILIRUB SERPL-MCNC: 0.4 MG/DL
BUN SERPL-MCNC: 14.1 MG/DL (ref 8–23)
CALCIUM SERPL-MCNC: 9.5 MG/DL (ref 8.8–10.2)
CHLORIDE SERPL-SCNC: 102 MMOL/L (ref 98–107)
CHOLEST SERPL-MCNC: 141 MG/DL
CREAT SERPL-MCNC: 1.09 MG/DL (ref 0.67–1.17)
CREAT UR-MCNC: 204 MG/DL
DEPRECATED HCO3 PLAS-SCNC: 23 MMOL/L (ref 22–29)
EGFRCR SERPLBLD CKD-EPI 2021: 73 ML/MIN/1.73M2
FASTING STATUS PATIENT QL REPORTED: YES
GLUCOSE SERPL-MCNC: 135 MG/DL (ref 70–99)
HBA1C MFR BLD: 6.8 % (ref 0–5.6)
HDLC SERPL-MCNC: 55 MG/DL
LDLC SERPL CALC-MCNC: 60 MG/DL
MICROALBUMIN UR-MCNC: 15.9 MG/L
MICROALBUMIN/CREAT UR: 7.79 MG/G CR (ref 0–17)
NONHDLC SERPL-MCNC: 86 MG/DL
POTASSIUM SERPL-SCNC: 4.2 MMOL/L (ref 3.4–5.3)
PROT SERPL-MCNC: 6.7 G/DL (ref 6.4–8.3)
SODIUM SERPL-SCNC: 137 MMOL/L (ref 135–145)
TRIGL SERPL-MCNC: 129 MG/DL

## 2024-04-08 PROCEDURE — 91320 SARSCV2 VAC 30MCG TRS-SUC IM: CPT | Performed by: INTERNAL MEDICINE

## 2024-04-08 PROCEDURE — 99213 OFFICE O/P EST LOW 20 MIN: CPT | Mod: 25 | Performed by: INTERNAL MEDICINE

## 2024-04-08 PROCEDURE — 90480 ADMN SARSCOV2 VAC 1/ONLY CMP: CPT | Performed by: INTERNAL MEDICINE

## 2024-04-08 PROCEDURE — 82570 ASSAY OF URINE CREATININE: CPT | Performed by: INTERNAL MEDICINE

## 2024-04-08 PROCEDURE — 99397 PER PM REEVAL EST PAT 65+ YR: CPT | Performed by: INTERNAL MEDICINE

## 2024-04-08 PROCEDURE — 82043 UR ALBUMIN QUANTITATIVE: CPT | Performed by: INTERNAL MEDICINE

## 2024-04-08 PROCEDURE — 80053 COMPREHEN METABOLIC PANEL: CPT | Performed by: INTERNAL MEDICINE

## 2024-04-08 PROCEDURE — 80061 LIPID PANEL: CPT | Performed by: INTERNAL MEDICINE

## 2024-04-08 PROCEDURE — 83036 HEMOGLOBIN GLYCOSYLATED A1C: CPT | Performed by: INTERNAL MEDICINE

## 2024-04-08 PROCEDURE — 36415 COLL VENOUS BLD VENIPUNCTURE: CPT | Performed by: INTERNAL MEDICINE

## 2024-04-08 RX ORDER — EZETIMIBE AND SIMVASTATIN 10; 20 MG/1; MG/1
1 TABLET ORAL AT BEDTIME
Qty: 90 TABLET | Refills: 3 | Status: SHIPPED | OUTPATIENT
Start: 2024-04-08 | End: 2024-04-12

## 2024-04-08 RX ORDER — METOPROLOL SUCCINATE 25 MG/1
25 TABLET, EXTENDED RELEASE ORAL DAILY
Qty: 90 TABLET | Refills: 3 | Status: SHIPPED | OUTPATIENT
Start: 2024-04-08 | End: 2024-04-12

## 2024-04-08 RX ORDER — LISINOPRIL 10 MG/1
10 TABLET ORAL DAILY
Qty: 90 TABLET | Refills: 3 | Status: SHIPPED | OUTPATIENT
Start: 2024-04-08 | End: 2024-04-12

## 2024-04-08 RX ORDER — KETOCONAZOLE 20 MG/ML
SHAMPOO TOPICAL DAILY PRN
COMMUNITY
Start: 2024-02-03

## 2024-04-08 ASSESSMENT — PAIN SCALES - GENERAL: PAINLEVEL: NO PAIN (0)

## 2024-04-08 NOTE — PROGRESS NOTES
Preventive Care Visit  [facility] KURTIS Smith MD, Internal Medicine - Pediatrics  Apr 8, 2024      Assessment & Plan     Type 2 diabetes mellitus with other circulatory complication, without long-term current use of insulin (H)  Parameters well controlled.  Continue secondary risk factor modification for BP, cholesterol, anticoagulation, and smoking cessation.   - HEMOGLOBIN A1C; Future  - Albumin Random Urine Quantitative with Creat Ratio; Future    Mixed hyperlipidemia  Ongoing zetia  - ezetimibe-simvastatin (VYTORIN) 10-20 MG tablet; Take 1 tablet by mouth at bedtime  - Lipid panel reflex to direct LDL Fasting; Future    Coronary artery disease involving native coronary artery of native heart without angina pectoris  Secondary risk factor modification.   - lisinopril (ZESTRIL) 10 MG tablet; Take 1 tablet (10 mg) by mouth daily  - metoprolol succinate ER (TOPROL XL) 25 MG 24 hr tablet; Take 1 tablet (25 mg) by mouth daily    Impaired fasting glucose  Ongoing metformin.   - metFORMIN (GLUCOPHAGE) 500 MG tablet; Take 1 tablet (500 mg) by mouth daily (with breakfast)    Coronary artery disease involving native heart without angina pectoris, unspecified vessel or lesion type  Secondary risk factor modification.   - metoprolol succinate ER (TOPROL XL) 25 MG 24 hr tablet; Take 1 tablet (25 mg) by mouth daily    Benign essential hypertension  At goal.   - Comprehensive metabolic panel (BMP + Alb, Alk Phos, ALT, AST, Total. Bili, TP); Future    Gout, unspecified cause, unspecified chronicity, unspecified site  Asymptomatic. No flares.     Encounter for Medicare annual wellness exam  Discussed diet, exercise, testicular self exam, blood pressure, cholesterol, and need for cancer surveillance at appropriate ages.     Trochanteric bursitis of right hip  Referred to orthopedics for possible injection.  - Orthopedic  Referral; Future    Patient has been advised of split billing requirements and  indicates understanding: Yes          Counseling  Appropriate preventive services were discussed with this patient, including applicable screening as appropriate for fall prevention, nutrition, physical activity, Tobacco-use cessation, weight loss and cognition.  Checklist reviewing preventive services available has been given to the patient.  Reviewed patient's diet, addressing concerns and/or questions.   He is at risk for lack of exercise and has been provided with information to increase physical activity for the benefit of his well-being.       See Patient Instructions    Abelardo Li is a 73 year old, presenting for the following:  Physical (Fasting )        4/8/2024     7:26 AM   Additional Questions   Roomed by ELAINE Astudillo   Accompanied by CHERRI         4/8/2024     7:26 AM   Patient Reported Additional Medications   Patient reports taking the following new medications n/a         Health Care Directive  Patient does not have a Health Care Directive or Living Will: Patient states has Advance Directive and will bring in a copy to clinic.    HPI  Right lateral hip inflammtion from possible bursitis.             4/7/2024   General Health   How would you rate your overall physical health? Good   Feel stress (tense, anxious, or unable to sleep) Not at all         4/7/2024   Nutrition   Diet: Regular (no restrictions)         4/7/2024   Exercise   Days per week of moderate/strenous exercise 1 day   Average minutes spent exercising at this level 30 min   (!) EXERCISE CONCERN      4/7/2024   Social Factors   Frequency of gathering with friends or relatives Once a week   Worry food won't last until get money to buy more No   Food not last or not have enough money for food? No   Do you have housing?  Yes   Are you worried about losing your housing? No   Lack of transportation? No   Unable to get utilities (heat,electricity)? No         4/8/2024   Fall Risk   Fallen 2 or more times in the past year? No   Trouble with  walking or balance? No          2024   Activities of Daily Living- Home Safety   Needs help with the following daily activites None of the above   Safety concerns in the home None of the above         2024   Dental   Dentist two times every year? Yes         2024   Hearing Screening   Hearing concerns? None of the above         2024   Driving Risk Screening   Patient/family members have concerns about driving No         2024   General Alertness/Fatigue Screening   Have you been more tired than usual lately? No         2024   Urinary Incontinence Screening   Bothered by leaking urine in past 6 months No         2024   TB Screening   Were you born outside of the [place]? No         Today's PHQ-2 Score:       2024     8:49 AM   PHQ-2 (  [employer])   Q1: Little interest or pleasure in doing things 0   Q2: Feeling down, depressed or hopeless 0   PHQ-2 Score 0   Q1: Little interest or pleasure in doing things Not at all   Q2: Feeling down, depressed or hopeless Not at all   PHQ-2 Score 0           2024   Substance Use   Alcohol more than 3/day or more than 7/wk Not Applicable   Do you have a current opioid prescription? No   How severe/bad is pain from 1 to 10? 0/10 (No Pain)   Do you use any other substances recreationally? No     Social History     Tobacco Use    Smoking status: Former     Packs/day: 1.50     Years: 21.00     Additional pack years: 0.00     Total pack years: 31.50     Types: Cigarettes     Quit date: 1998     Years since quittin.9     Passive exposure: Past    Smokeless tobacco: Never    Tobacco comments:     previous 2 ppd smoker   Vaping Use    Vaping Use: Never used   Substance Use Topics    Alcohol use: Not Currently    Drug use: No       ASCVD Risk   The 10-year ASCVD risk score (Migel CARMICHAEL, et al., 2019) is: 33.6%    Values used to calculate the score:      Age: 73 years      Sex: Male      Is Non- : No      Diabetic: Yes     "  Tobacco smoker: No      Systolic Blood Pressure: 130 mmHg      Is BP treated: Yes      HDL Cholesterol: 54 mg/dL      Total Cholesterol: 144 mg/dL            Reviewed and updated as needed this visit by Provider                    Past Medical History:   Diagnosis Date    Alcohol Dependence in Remission     sober since 1996    CAD (coronary artery disease)     cardiac cath 2005: stent to PDA    Diverticulitis of colon 06/09/2008    Diagnosed after diarrheal episode from colchicine; \"Probable\" on CT scan 6/08.    GERD (gastroesophageal reflux disease)     Gout     History of acute inferior wall MI     2005    Left ventricular diastolic dysfunction     Mixed hyperlipidemia     Sleep apnea     C PAP     Past Surgical History:   Procedure Laterality Date    COLONOSCOPY N/A 3/12/2024    Procedure: Colonoscopy;  Surgeon: Nate Griffith MD;  Location:  GI    HC REPAIR OF NASAL SEPTUM      Septoplasty and uvulectomy    ZZ CORONARY STENT PERCUT, INITIAL VESSEL  4/18/2005    PTCA with intracoronary stent placement of, distal RCA and ostial PDA     Current providers sharing in care for this patient include:  Patient Care Team:  Kvng Smith MD as PCP - General (Internal Medicine)  Kvng Smith MD as Assigned PCP  Patrice Fountain MD as Assigned Heart and Vascular Provider  Rcahael Flannery AuD as Audiologist (Audiology)    The following health maintenance items are reviewed in Epic and correct as of today:  Health Maintenance   Topic Date Due    ANNUAL REVIEW OF HM ORDERS  04/11/2023    A1C  03/07/2024    MEDICARE ANNUAL WELLNESS VISIT  04/18/2024    MICROALBUMIN  04/18/2024    BMP  08/03/2024    LIPID  08/03/2024    DIABETIC FOOT EXAM  12/07/2024    EYE EXAM  03/25/2025    FALL RISK ASSESSMENT  04/08/2025    ADVANCE CARE PLANNING  04/11/2027    DTAP/TDAP/TD IMMUNIZATION (3 - Td or Tdap) 03/05/2029    HEPATITIS C SCREENING  Completed    PHQ-2 (once per calendar year)  Completed    INFLUENZA VACCINE  Completed    " "Pneumococcal Vaccine: 65+ Years  Completed    ZOSTER IMMUNIZATION  Completed    RSV VACCINE (Pregnancy & 60+)  Completed    AORTIC ANEURYSM SCREENING (SYSTEM ASSIGNED)  Completed    COVID-19 Vaccine  Completed    IPV IMMUNIZATION  Aged Out    HPV IMMUNIZATION  Aged Out    MENINGITIS IMMUNIZATION  Aged Out    RSV MONOCLONAL ANTIBODY  Aged Out    COLORECTAL CANCER SCREENING  Discontinued         Review of Systems  Constitutional, HEENT, cardiovascular, pulmonary, GI, , musculoskeletal, neuro, skin, endocrine and psych systems are negative, except as otherwise noted.     Objective    Exam  /80   Pulse 71   Temp 97.5  F (36.4  C) (Oral)   Resp 18   Ht 1.622 m (5' 3.86\")   Wt 84.2 kg (185 lb 11.2 oz)   SpO2 97%   BMI 32.02 kg/m     Estimated body mass index is 32.02 kg/m  as calculated from the following:    Height as of this encounter: 1.622 m (5' 3.86\").    Weight as of this encounter: 84.2 kg (185 lb 11.2 oz).    Physical Exam  GENERAL: alert and no distress  EYES: Eyes grossly normal to inspection, PERRL and conjunctivae and sclerae normal  HENT: ear canals and TM's normal, nose and mouth without ulcers or lesions  NECK: no adenopathy, no asymmetry, masses, or scars  RESP: lungs clear to auscultation - no rales, rhonchi or wheezes  CV: regular rate and rhythm, normal S1 S2, no S3 or S4, no murmur, click or rub, no peripheral edema  ABDOMEN: soft, nontender, no hepatosplenomegaly, no masses and bowel sounds normal   (male): normal male genitalia without lesions or urethral discharge, no hernia  MS: no gross musculoskeletal defects noted, no edema  SKIN: no suspicious lesions or rashes  NEURO: Normal strength and tone, mentation intact and speech normal  PSYCH: mentation appears normal, affect normal/bright        4/8/2024   Mini Cog   Clock Draw Score 0 Abnormal   3 Item Recall 1 object recalled   Mini Cog Total Score 1              Signed Electronically by: Kvng Smith MD    "

## 2024-04-08 NOTE — PATIENT INSTRUCTIONS
Refills given today.    Labs today    COVID booster today.    No cancer screenings are due.    With respect to your hip, let's have you see orthopedics.     Preventive Care Advice   This is general advice given by our system to help you stay healthy. However, your care team may have specific advice just for you. Please talk to your care team about your preventive care needs.  Nutrition  Eat 5 or more servings of fruits and vegetables each day.  Try wheat bread, brown rice and whole grain pasta (instead of white bread, rice, and pasta).  Get enough calcium and vitamin D. Check the label on foods and aim for 100% of the RDA (recommended daily allowance).  Lifestyle  Exercise at least 150 minutes each week   (30 minutes a day, 5 days a week).  Do muscle strengthening activities 2 days a week. These help control your weight and prevent disease.  No smoking.  Wear sunscreen to prevent skin cancer.  Have a dental exam and cleaning every 6 months.  Yearly exams  See your health care team every year to talk about:  Any changes in your health.  Any medicines your care team has prescribed.  Preventive care, family planning, and ways to prevent chronic diseases.  Shots (vaccines)   HPV shots (up to age 26), if you've never had them before.  Hepatitis B shots (up to age 59), if you've never had them before.  COVID-19 shot: Get this shot when it's due.  Flu shot: Get a flu shot every year.  Tetanus shot: Get a tetanus shot every 10 years.  Pneumococcal, hepatitis A, and RSV shots: Ask your care team if you need these based on your risk.  Shingles shot (for age 50 and up).  General health tests  Diabetes screening:  Starting at age 35, Get screened for diabetes at least every 3 years.  If you are younger than age 35, ask your care team if you should be screened for diabetes.  Cholesterol test: At age 39, start having a cholesterol test every 5 years, or more often if advised.  Bone density scan (DEXA): At age 50, ask your care  team if you should have this scan for osteoporosis (brittle bones).  Hepatitis C: Get tested at least once in your life.  STIs (sexually transmitted infections)  Before age 24: Ask your care team if you should be screened for STIs.  After age 24: Get screened for STIs if you're at risk. You are at risk for STIs (including HIV) if:  You are sexually active with more than one person.  You don't use condoms every time.  You or a partner was diagnosed with a sexually transmitted infection.  If you are at risk for HIV, ask about PrEP medicine to prevent HIV.  Get tested for HIV at least once in your life, whether you are at risk for HIV or not.  Cancer screening tests  Cervical cancer screening: If you have a cervix, begin getting regular cervical cancer screening tests at age 21. Most people who have regular screenings with normal results can stop after age 65. Talk about this with your provider.  Breast cancer scan (mammogram): If you've ever had breasts, begin having regular mammograms starting at age 40. This is a scan to check for breast cancer.  Colon cancer screening: It is important to start screening for colon cancer at age 45.  Have a colonoscopy test every 10 years (or more often if you're at risk) Or, ask your provider about stool tests like a FIT test every year or Cologuard test every 3 years.  To learn more about your testing options, visit: https://www.[employer]/732075.pdf.  For help making a decision, visit: https://bit.ly/qy30212.  Prostate cancer screening test: If you have a prostate and are age 55 to 69, ask your provider if you would benefit from a yearly prostate cancer screening test.  Lung cancer screening: If you are a current or former smoker age 50 to 80, ask your care team if ongoing lung cancer screenings are right for you.  For informational purposes only. Not to replace the advice of your health care provider. Copyright   2023 [employer]. All rights reserved. Clinically  reviewed by the [facility] Transitions Program. [employer] [id number] - REV 01/24.

## 2024-04-12 ENCOUNTER — TELEPHONE (OUTPATIENT)
Dept: CARDIOLOGY | Facility: CLINIC | Age: 74
End: 2024-04-12
Payer: COMMERCIAL

## 2024-04-12 DIAGNOSIS — I25.10 CORONARY ARTERY DISEASE INVOLVING NATIVE CORONARY ARTERY OF NATIVE HEART WITHOUT ANGINA PECTORIS: ICD-10-CM

## 2024-04-12 DIAGNOSIS — I25.10 CORONARY ARTERY DISEASE INVOLVING NATIVE HEART WITHOUT ANGINA PECTORIS, UNSPECIFIED VESSEL OR LESION TYPE: ICD-10-CM

## 2024-04-12 DIAGNOSIS — R73.01 IMPAIRED FASTING GLUCOSE: ICD-10-CM

## 2024-04-12 DIAGNOSIS — E78.2 MIXED HYPERLIPIDEMIA: ICD-10-CM

## 2024-04-12 RX ORDER — EZETIMIBE AND SIMVASTATIN 10; 20 MG/1; MG/1
1 TABLET ORAL AT BEDTIME
Qty: 90 TABLET | Refills: 3 | Status: SHIPPED | OUTPATIENT
Start: 2024-04-12

## 2024-04-12 RX ORDER — LISINOPRIL 10 MG/1
10 TABLET ORAL DAILY
Qty: 90 TABLET | Refills: 3 | Status: SHIPPED | OUTPATIENT
Start: 2024-04-12

## 2024-04-12 RX ORDER — METOPROLOL SUCCINATE 25 MG/1
25 TABLET, EXTENDED RELEASE ORAL DAILY
Qty: 90 TABLET | Refills: 3 | Status: SHIPPED | OUTPATIENT
Start: 2024-04-12

## 2024-04-12 NOTE — TELEPHONE ENCOUNTER
"Team received a request from [facility] pharmacy, \"exclusive mail order pharmacy for [place] insurance\".   They requested Dr. Fountain to send in new Rx for the Metoprolol Succinate 25mg tablet daily.    However, there were 4 prescriptions filled by the PCP, Dr. Kvng Smith, on 4\8\24, which included this Metoprolol.       Writer called patient, and Guillermo states that he does now remember that when he changed insurances starting in January, that perhaps it was not using the same [facility] pharmacy as last year.    Writer has routed this message to Dr. Smith's team, to ask them to please resubmit the Rx for Metoprolol, and the other 3 from that date 4\8\24, to the [facility] sponsored Mail order pharmacy.    [facility]  [address] [place] [address]  [id number]  FAX:  [phone]    Robina Aly RN on 4/12/2024 at 3:51 PM    "

## 2024-08-04 ENCOUNTER — HEALTH MAINTENANCE LETTER (OUTPATIENT)
Age: 74
End: 2024-08-04

## 2024-10-16 ENCOUNTER — TELEPHONE (OUTPATIENT)
Dept: PEDIATRICS | Facility: CLINIC | Age: 74
End: 2024-10-16
Payer: COMMERCIAL

## 2024-10-16 NOTE — TELEPHONE ENCOUNTER
Forms/Letter Request    Type of form/letter: OTHER: MARTIN        Do we have the form/letter: Yes: Form is on the provider's desk for review      Who is the form from? [employer]  (if other please explain)    Where did/will the form come from? form was faxed in    When is form/letter needed by: ASAP

## 2024-10-17 ENCOUNTER — MEDICAL CORRESPONDENCE (OUTPATIENT)
Dept: HEALTH INFORMATION MANAGEMENT | Facility: CLINIC | Age: 74
End: 2024-10-17
Payer: COMMERCIAL

## 2024-10-21 NOTE — PROGRESS NOTES
HISTORY OF PRESENT ILLNESS:    This is a 74 year old male who follows with Dr Fountain at [facility]  His past medical history includes:  Coronary artery disease, hyperlipidemia with hypertriglyceridemia, hypertension, type II diabetes, sleep apnea,     Mr Ford suffered an acute inferior MI (2005) and underwent stenting to his distal RCA/PDA bifurcation then  Otherwise, he was noted to have mild-moderate disease elsewhere ECHO then showed LVEF 40-45% with basal inferolateral hypokinesis  In follow up, his LVEF normalized and free from any anginal symptoms since that time    Over the years, he has undergone multiple stress echos and Radha NUC stress tests  His last stress echo (2017) did not show any stress-induced ischemia  LVEF 65%    ECHO (8/2023) showed LVEF 55-60% with normal wall motion, no significant valvular pathology    When seen in clinic last year, his Metoprolol dose was lowered due to mild resting bradycardia    Our visit today is for further review    Mr Ford is active without any limitations  He does not participate in any formal exercise, but is still working and walks frequently  He denies any chest pain or epigastric discomfort (prior anginal symptom)  He has no shortness of breath, palpitations, orthopnea, or peripheral edema  He sleeps well with his CPAP mask      VITAL SIGNS:  BP:  119/71  Pulse:  56  Weight:  183 lbs  (BMI: 31)      IMPRESSION AND PLAN:    Coronary Artery Disease:  -s/p MI and stenting to distal RCA/PDA (2005)  -denies angina  -last stress echo (2017) was negative for ischemia  -LVEF 55%    Mixed Hyperlipidemia  -on Vytorin 10-20 mg  -LDL 60  Triglycerides: 129    Hypertension:  -on Metoprolol XR 25 mg, Lisinopril 10 mg  -BP controlled    Type II Diabetes:  -Hgb A1C  6.8    Treated Sleep Apnea    Obesity  -encouraged weight loss and lifestyle modifications    The total time for the visit today was 20 minutes which includes patient visit, reviewing of records,  discussion, and placing of orders of the outpatient coordination of cardiovascular care as described.  The level of medical decision making during this visit was of mild complexity.  Thank you for allowing me to participate in their care.    The longitudinal plan of care for the diagnosis(es)/condition(s) as documented were addressed during this visit. Due to the added complexity in care, I will continue to support Guillermo in the subsequent management and with ongoing continuity of care.            Orders Placed This Encounter   Procedures    Follow-Up with Cardiology       No orders of the defined types were placed in this encounter.      There are no discontinued medications.      Encounter Diagnosis   Name Primary?    Coronary artery disease involving native coronary artery of native heart without angina pectoris Yes       CURRENT MEDICATIONS:  Current Outpatient Medications   Medication Sig Dispense Refill    aspirin (ASA) 81 MG tablet Take 1 tablet (81 mg) by mouth daily 90 tablet 3    ezetimibe-simvastatin (VYTORIN) 10-20 MG tablet Take 1 tablet by mouth at bedtime 90 tablet 3    ketoconazole (NIZORAL) 2 % external shampoo Apply topically daily as needed      lisinopril (ZESTRIL) 10 MG tablet Take 1 tablet (10 mg) by mouth daily 90 tablet 3    metFORMIN (GLUCOPHAGE) 500 MG tablet Take 1 tablet (500 mg) by mouth daily (with breakfast) 90 tablet 3    metoprolol succinate ER (TOPROL XL) 25 MG 24 hr tablet Take 1 tablet (25 mg) by mouth daily 90 tablet 3    order for DME Equipment being ordered: continuous positive airway pressure machine with associated supplies.    Pressure settin = 8 1 each 0    sildenafil (REVATIO) 20 MG tablet 2 tabs daily as needed for erectile dysfunction. 60 tablet 5       ALLERGIES   No Known Allergies    PAST MEDICAL HISTORY:  Past Medical History:   Diagnosis Date    Alcohol Dependence in Remission     sober since 1996    CAD (coronary artery disease)     cardiac cath 2005: stent to PDA  "   Diverticulitis of colon 2008    Diagnosed after diarrheal episode from colchicine; \"Probable\" on CT scan .    GERD (gastroesophageal reflux disease)     Gout     History of acute inferior wall MI         Left ventricular diastolic dysfunction     Mixed hyperlipidemia     Sleep apnea     C PAP       PAST SURGICAL HISTORY:  Past Surgical History:   Procedure Laterality Date    COLONOSCOPY N/A 3/12/2024    Procedure: Colonoscopy;  Surgeon: Nate Griffith MD;  Location: [facility]    HC REPAIR OF NASAL SEPTUM      Sept[facility]  [facility]    PTCA with intracoronary stent placement of, distal RCA and ostial PDA       FAMILY HISTORY:  Family History   Problem Relation Age of Onset    C.A.D. Mother     C.A.D. Father     Diabetes Brother         unsure of type 1/2    Family History Negative Sister     Family History Negative Son     Family History Negative Daughter     Family History Negative Brother     Cancer - colorectal No family hx of     Prostate Cancer No family hx of        SOCIAL HISTORY:  Social History     Socioeconomic History    Marital status:      Spouse name: None    Number of children: None    Years of education: None    Highest education level: None   Tobacco Use    Smoking status: Former     Current packs/day: 0.00     Average packs/day: 1.5 packs/day for 21.0 years (31.5 ttl pk-yrs)     Types: Cigarettes     Start date: 1977     Quit date: 1998     Years since quittin.5     Passive exposure: Past    Smokeless tobacco: Never    Tobacco comments:     previous 2 ppd smoker   Vaping Use    Vaping status: Never Used   Substance and Sexual Activity    Alcohol use: Not Currently    Drug use: No    Sexual activity: Yes     Partners: Female     Birth control/protection: None   Other Topics Concern    Parent/sibling w/ CABG, MI or angioplasty before 65F 55M? No    Caffeine Concern No     Comment: coffee -  " cups a day    Sleep Concern No    Stress Concern No    Weight Concern No    Special Diet No    Exercise Yes     Comment: tredmill 1-2 days a week    Seat Belt Yes     Social Determinants of Health     Financial Resource Strain: Low Risk  (4/7/2024)    Financial Resource Strain     Within the past 12 months, have you or your family members you live with been unable to get utilities (heat, electricity) when it was really needed?: No   Food Insecurity: Low Risk  (4/7/2024)    Food Insecurity     Within the past 12 months, did you worry that your food would run out before you got money to buy more?: No     Within the past 12 months, did the food you bought just not last and you didn t have money to get more?: No   Transportation Needs: Low Risk  (4/7/2024)    Transportation Needs     Within the past 12 months, has lack of transportation kept you from medical appointments, getting your medicines, non-medical meetings or appointments, work, or from getting things that you need?: No   Physical Activity: Insufficiently Active (4/7/2024)    Exercise Vital Sign     Days of Exercise per Week: 1 day     Minutes of Exercise per Session: 30 min   Stress: No Stress Concern Present (4/7/2024)    [employer] [place] [employer] - Occupational Stress Questionnaire     Feeling of Stress : Not at all   Social Connections: Moderately Isolated (4/7/2024)    Social Connection and Isolation Panel [NHANES]     Frequency of Communication with Friends and Family: Three times a week     Frequency of Social Gatherings with Friends and Family: Once a week     Attends Judaism Services: Never     Active Member of Clubs or Organizations: No     Marital Status: Living with partner   Interpersonal Safety: Low Risk  (4/8/2024)    Interpersonal Safety     Do you feel physically and emotionally safe where you currently live?: Yes     Within the past 12 months, have you been hit, slapped, kicked or otherwise physically hurt by someone?: No      "Within the past 12 months, have you been humiliated or emotionally abused in other ways by your partner or ex-partner?: No   Housing Stability: Low Risk  (4/7/2024)    Housing Stability     Do you have housing? : Yes     Are you worried about losing your housing?: No       Review of Systems:  Skin:  not assessed       Eyes:  Positive for glasses glasses (reading)  ENT:  not assessed      Respiratory:  Positive for sleep apnea;CPAP wears CPAP at night   Cardiovascular:  Negative      Gastroenterology: not assessed      Genitourinary:  not assessed      Musculoskeletal:  not assessed      Neurologic:  not assessed      Psychiatric:  not assessed      Heme/Lymph/Imm:  not assessed      Endocrine:  not assessed        Physical Exam:  Vitals: /71 (BP Location: Right arm, Patient Position: Sitting, Cuff Size: Adult Regular)   Pulse 56   Ht 1.622 m (5' 3.86\")   Wt 83.2 kg (183 lb 8 oz)   SpO2 97%   BMI 31.64 kg/m      Constitutional:  cooperative overweight      Skin:  warm and dry to the touch          Head:  normocephalic        Eyes:           Lymph:      ENT:  no pallor or cyanosis        Neck:  JVP normal        Respiratory:  clear to auscultation;normal respiratory excursion         Cardiac: regular rhythm   S4   systolic murmur;LUSB;grade 1        pulses full and equal                                        GI:    obese      Extremities and Muscular Skeletal:  no edema              Neurological:  no gross motor deficits        Psych:  Alert and Oriented x 3;affect appropriate, oriented to time, person and place          CC  Patrice Fountain MD  [address]  [place] [address]                    "

## 2024-10-22 ENCOUNTER — OFFICE VISIT (OUTPATIENT)
Dept: CARDIOLOGY | Facility: CLINIC | Age: 74
End: 2024-10-22
Payer: COMMERCIAL

## 2024-10-22 VITALS
HEART RATE: 56 BPM | OXYGEN SATURATION: 97 % | SYSTOLIC BLOOD PRESSURE: 119 MMHG | WEIGHT: 183.5 LBS | HEIGHT: 64 IN | BODY MASS INDEX: 31.33 KG/M2 | DIASTOLIC BLOOD PRESSURE: 71 MMHG

## 2024-10-22 DIAGNOSIS — I25.10 CORONARY ARTERY DISEASE INVOLVING NATIVE CORONARY ARTERY OF NATIVE HEART WITHOUT ANGINA PECTORIS: Primary | ICD-10-CM

## 2024-10-22 PROCEDURE — G2211 COMPLEX E/M VISIT ADD ON: HCPCS | Performed by: NURSE PRACTITIONER

## 2024-10-22 PROCEDURE — 99213 OFFICE O/P EST LOW 20 MIN: CPT | Performed by: NURSE PRACTITIONER

## 2024-10-22 NOTE — LETTER
10/22/2024    Kvng Smith MD  [address] [place] [address]    RE: Guillermo Sainielizabeth       Dear Colleague,     I had the pleasure of seeing Guillermo Ford in the [facility].  HISTORY OF PRESENT ILLNESS:    This is a 74 year old male who follows with Dr Fountain at [facility]  His past medical history includes:  Coronary artery disease, hyperlipidemia with hypertriglyceridemia, hypertension, type II diabetes, sleep apnea,     Mr Ford suffered an acute inferior MI (2005) and underwent stenting to his distal RCA/PDA bifurcation then  Otherwise, he was noted to have mild-moderate disease elsewhere ECHO then showed LVEF 40-45% with basal inferolateral hypokinesis  In follow up, his LVEF normalized and free from any anginal symptoms since that time    Over the years, he has undergone multiple stress echos and Radha NUC stress tests  His last stress echo (2017) did not show any stress-induced ischemia  LVEF 65%    ECHO (8/2023) showed LVEF 55-60% with normal wall motion, no significant valvular pathology    When seen in clinic last year, his Metoprolol dose was lowered due to mild resting bradycardia    Our visit today is for further review    Mr Ford is active without any limitations  He does not participate in any formal exercise, but is still working and walks frequently  He denies any chest pain or epigastric discomfort (prior anginal symptom)  He has no shortness of breath, palpitations, orthopnea, or peripheral edema  He sleeps well with his CPAP mask      VITAL SIGNS:  BP:  119/71  Pulse:  56  Weight:  183 lbs  (BMI: 31)      IMPRESSION AND PLAN:    Coronary Artery Disease:  -s/p MI and stenting to distal RCA/PDA (2005)  -denies angina  -last stress echo (2017) was negative for ischemia  -LVEF 55%    Mixed Hyperlipidemia  -on Vytorin 10-20 mg  -LDL 60  Triglycerides: 129    Hypertension:  -on Metoprolol XR 25 mg, Lisinopril 10 mg  -BP controlled    Type II  Diabetes:  -Hgb A1C  6.8    Treated Sleep Apnea    Obesity  -encouraged weight loss and lifestyle modifications    The total time for the visit today was 20 minutes which includes patient visit, reviewing of records, discussion, and placing of orders of the outpatient coordination of cardiovascular care as described.  The level of medical decision making during this visit was of mild complexity.  Thank you for allowing me to participate in their care.    The longitudinal plan of care for the diagnosis(es)/condition(s) as documented were addressed during this visit. Due to the added complexity in care, I will continue to support Guillermo in the subsequent management and with ongoing continuity of care.            Orders Placed This Encounter   Procedures     Follow-Up with Cardiology       No orders of the defined types were placed in this encounter.      There are no discontinued medications.      Encounter Diagnosis   Name Primary?     Coronary artery disease involving native coronary artery of native heart without angina pectoris Yes       CURRENT MEDICATIONS:  Current Outpatient Medications   Medication Sig Dispense Refill     aspirin (ASA) 81 MG tablet Take 1 tablet (81 mg) by mouth daily 90 tablet 3     ezetimibe-simvastatin (VYTORIN) 10-20 MG tablet Take 1 tablet by mouth at bedtime 90 tablet 3     ketoconazole (NIZORAL) 2 % external shampoo Apply topically daily as needed       lisinopril (ZESTRIL) 10 MG tablet Take 1 tablet (10 mg) by mouth daily 90 tablet 3     metFORMIN (GLUCOPHAGE) 500 MG tablet Take 1 tablet (500 mg) by mouth daily (with breakfast) 90 tablet 3     metoprolol succinate ER (TOPROL XL) 25 MG 24 hr tablet Take 1 tablet (25 mg) by mouth daily 90 tablet 3     order for DME Equipment being ordered: continuous positive airway pressure machine with associated supplies.    Pressure settin = 8 1 each 0     sildenafil (REVATIO) 20 MG tablet 2 tabs daily as needed for erectile dysfunction. 60 tablet 5  "      ALLERGIES   No Known Allergies    PAST MEDICAL HISTORY:  Past Medical History:   Diagnosis Date     Alcohol Dependence in Remission     sober since      CAD (coronary artery disease)     cardiac cath 2005: stent to PDA     Diverticulitis of colon 2008    Diagnosed after diarrheal episode from colchicine; \"Probable\" on CT scan .     GERD (gastroesophageal reflux disease)      Gout      History of acute inferior wall MI          Left ventricular diastolic dysfunction      Mixed hyperlipidemia      Sleep apnea     C PAP       PAST SURGICAL HISTORY:  Past Surgical History:   Procedure Laterality Date     COLONOSCOPY N/A 3/12/2024    Procedure: Colonoscopy;  Surgeon: Nate Griffith MD;  Location: [facility]     HC REPAIR OF NASAL SEPTUM      Septoplasty and uvulectomy     [facility] CORONARY STENT PERCUT, INITIAL VESSEL  2005    PTCA with intracoronary stent placement of, distal RCA and ostial PDA       FAMILY HISTORY:  Family History   Problem Relation Age of Onset     C.A.D. Mother      C.A.D. Father      Diabetes Brother         unsure of type 1/2     Family History Negative Sister      Family History Negative Son      Family History Negative Daughter      Family History Negative Brother      Cancer - colorectal No family hx of      Prostate Cancer No family hx of        SOCIAL HISTORY:  Social History     Socioeconomic History     Marital status:      Spouse name: None     Number of children: None     Years of education: None     Highest education level: None   Tobacco Use     Smoking status: Former     Current packs/day: 0.00     Average packs/day: 1.5 packs/day for 21.0 years (31.5 ttl pk-yrs)     Types: Cigarettes     Start date: 1977     Quit date: 1998     Years since quittin.5     Passive exposure: Past     Smokeless tobacco: Never     Tobacco comments:     previous 2 ppd smoker   Vaping Use     Vaping status: Never Used   Substance and Sexual Activity     Alcohol " use: Not Currently     Drug use: No     Sexual activity: Yes     Partners: Female     Birth control/protection: None   Other Topics Concern     Parent/sibling w/ CABG, MI or angioplasty before 65F 55M? No     Caffeine Concern No     Comment: coffee 1/2 decaf- 4 cups a day     Sleep Concern No     Stress Concern No     Weight Concern No     Special Diet No     Exercise Yes     Comment: tredmill 1-2 days a week     Seat Belt Yes     Social Determinants of Health     Financial Resource Strain: Low Risk  (4/7/2024)    Financial Resource Strain      Within the past 12 months, have you or your family members you live with been unable to get utilities (heat, electricity) when it was really needed?: No   Food Insecurity: Low Risk  (4/7/2024)    Food Insecurity      Within the past 12 months, did you worry that your food would run out before you got money to buy more?: No      Within the past 12 months, did the food you bought just not last and you didn t have money to get more?: No   Transportation Needs: Low Risk  (4/7/2024)    Transportation Needs      Within the past 12 months, has lack of transportation kept you from medical appointments, getting your medicines, non-medical meetings or appointments, work, or from getting things that you need?: No   Physical Activity: Insufficiently Active (4/7/2024)    Exercise Vital Sign      Days of Exercise per Week: 1 day      Minutes of Exercise per Session: 30 min   Stress: No Stress Concern Present (4/7/2024)    [employer] of Occupational Health - Occupational Stress Questionnaire      Feeling of Stress : Not at all   Social Connections: Moderately Isolated (4/7/2024)    Social Connection and Isolation Panel [NHANES]      Frequency of Communication with Friends and Family: Three times a week      Frequency of Social Gatherings with Friends and Family: Once a week      Attends Jewish Services: Never      Active Member of Clubs or Organizations: No      Marital Status:  "Living with partner   Interpersonal Safety: Low Risk  (4/8/2024)    Interpersonal Safety      Do you feel physically and emotionally safe where you currently live?: Yes      Within the past 12 months, have you been hit, slapped, kicked or otherwise physically hurt by someone?: No      Within the past 12 months, have you been humiliated or emotionally abused in other ways by your partner or ex-partner?: No   Housing Stability: Low Risk  (4/7/2024)    Housing Stability      Do you have housing? : Yes      Are you worried about losing your housing?: No       Review of Systems:  Skin:  not assessed       Eyes:  Positive for glasses glasses (reading)  ENT:  not assessed      Respiratory:  Positive for sleep apnea;CPAP wears CPAP at night   Cardiovascular:  Negative      Gastroenterology: not assessed      Genitourinary:  not assessed      Musculoskeletal:  not assessed      Neurologic:  not assessed      Psychiatric:  not assessed      Heme/Lymph/Imm:  not assessed      Endocrine:  not assessed        Physical Exam:  Vitals: /71 (BP Location: Right arm, Patient Position: Sitting, Cuff Size: Adult Regular)   Pulse 56   Ht 1.622 m (5' 3.86\")   Wt 83.2 kg (183 lb 8 oz)   SpO2 97%   BMI 31.64 kg/m      Constitutional:  cooperative overweight      Skin:  warm and dry to the touch          Head:  normocephalic        Eyes:           Lymph:      ENT:  no pallor or cyanosis        Neck:  JVP normal        Respiratory:  clear to auscultation;normal respiratory excursion         Cardiac: regular rhythm   S4   systolic murmur;LUSB;grade 1        pulses full and equal                                        GI:    obese      Extremities and Muscular Skeletal:  no edema              Neurological:  no gross motor deficits        Psych:  Alert and Oriented x 3;affect appropriate, oriented to time, person and place          CC  Patrice Fountain MD  [address]  [place] [address]                      Thank you for " allowing me to participate in the care of your patient.      Sincerely,     BRI Chatterjee [facility]  cc:   Patrice Fountain MD  [address]  [place] [address]

## 2024-12-21 ENCOUNTER — HEALTH MAINTENANCE LETTER (OUTPATIENT)
Age: 74
End: 2024-12-21

## 2025-01-15 ENCOUNTER — TELEPHONE (OUTPATIENT)
Dept: PEDIATRICS | Facility: CLINIC | Age: 75
End: 2025-01-15
Payer: COMMERCIAL

## 2025-01-15 DIAGNOSIS — R73.01 IMPAIRED FASTING GLUCOSE: ICD-10-CM

## 2025-01-15 DIAGNOSIS — N52.9 ERECTILE DYSFUNCTION, UNSPECIFIED ERECTILE DYSFUNCTION TYPE: ICD-10-CM

## 2025-01-15 DIAGNOSIS — I25.10 CORONARY ARTERY DISEASE INVOLVING NATIVE HEART WITHOUT ANGINA PECTORIS, UNSPECIFIED VESSEL OR LESION TYPE: ICD-10-CM

## 2025-01-15 DIAGNOSIS — E78.2 MIXED HYPERLIPIDEMIA: ICD-10-CM

## 2025-01-15 DIAGNOSIS — I25.10 CORONARY ARTERY DISEASE INVOLVING NATIVE CORONARY ARTERY OF NATIVE HEART WITHOUT ANGINA PECTORIS: ICD-10-CM

## 2025-01-15 RX ORDER — METOPROLOL SUCCINATE 25 MG/1
25 TABLET, EXTENDED RELEASE ORAL DAILY
Qty: 90 TABLET | Refills: 2 | Status: SHIPPED | OUTPATIENT
Start: 2025-01-15

## 2025-01-15 RX ORDER — SILDENAFIL CITRATE 20 MG/1
TABLET ORAL
Qty: 60 TABLET | Refills: 5 | Status: SHIPPED | OUTPATIENT
Start: 2025-01-15

## 2025-01-15 RX ORDER — EZETIMIBE AND SIMVASTATIN 10; 20 MG/1; MG/1
1 TABLET ORAL AT BEDTIME
Qty: 90 TABLET | Refills: 0 | Status: CANCELLED | OUTPATIENT
Start: 2025-01-15

## 2025-01-15 RX ORDER — LISINOPRIL 10 MG/1
10 TABLET ORAL DAILY
Qty: 90 TABLET | Refills: 0 | Status: CANCELLED | OUTPATIENT
Start: 2025-01-15

## 2025-01-15 NOTE — TELEPHONE ENCOUNTER
Medication Question or Refill    Contacts       Contact Date/Time Type Contact Phone/Fax    01/15/2025 01:37 PM CST Phone (Incoming) Guillermo Ford (Self) [phone] ()            What medication are you calling about (include dose and sig)?: these 3 meds have to be faxed to a new mail order pharmacy, info below in BOLD  Lisinopril, metformin, ezetimibe    Preferred Pharmacy:   [facility] - [place], [place] - [address] [place] [address]  [address] [facility] [address]  Phone: [phone] Fax: [phone]    [id number] - [place], [place] - [address] [facility]  [address] [facility] [address]  Phone: [phone] Fax: [phone]    [facility] - [place], [place] - [facility]  [address]  [address]  [facility] [address]  Phone: [phone] Fax: [phone]    [facility] - [place], [place] - [address] [place] [address]  Phone: [phone] Fax: [phone]    [facility]  Fax: [phone]      Controlled Substance Agreement on file:   CSA -- Patient Level:    CSA: None found at the patient level.       Who prescribed the medication?: Dr Smith    Do you need a refill? Calling ahead w/ info    When did you use the medication last?     Patient offered an appointment? No    Do you have any questions or concerns?  No      Could we send this information to you in [facility] or would you prefer to receive a phone call?:   No preference   Okay to leave a detailed message?: Yes at Home number on file [phone] (home)

## 2025-01-15 NOTE — TELEPHONE ENCOUNTER
Left message asking patient to return the call.     Need to clarify which mail order pharmacy patient wants to use then we can send the last remaining refill to that pharmacy.

## 2025-01-17 NOTE — TELEPHONE ENCOUNTER
Sent [place] message to inquire what mail order pharmacy patient would like to use.  Blanca PROCTOR RN, BSN  Clinic RN  [facility]

## 2025-03-22 ENCOUNTER — HEALTH MAINTENANCE LETTER (OUTPATIENT)
Age: 75
End: 2025-03-22

## 2025-03-31 ENCOUNTER — TRANSFERRED RECORDS (OUTPATIENT)
Dept: HEALTH INFORMATION MANAGEMENT | Facility: CLINIC | Age: 75
End: 2025-03-31
Payer: COMMERCIAL

## 2025-03-31 LAB — RETINOPATHY: NEGATIVE

## 2025-04-13 SDOH — HEALTH STABILITY: PHYSICAL HEALTH: ON AVERAGE, HOW MANY MINUTES DO YOU ENGAGE IN EXERCISE AT THIS LEVEL?: 20 MIN

## 2025-04-13 SDOH — HEALTH STABILITY: PHYSICAL HEALTH: ON AVERAGE, HOW MANY DAYS PER WEEK DO YOU ENGAGE IN MODERATE TO STRENUOUS EXERCISE (LIKE A BRISK WALK)?: 2 DAYS

## 2025-04-13 ASSESSMENT — SOCIAL DETERMINANTS OF HEALTH (SDOH): HOW OFTEN DO YOU GET TOGETHER WITH FRIENDS OR RELATIVES?: ONCE A WEEK

## 2025-04-14 ENCOUNTER — OFFICE VISIT (OUTPATIENT)
Dept: PEDIATRICS | Facility: CLINIC | Age: 75
End: 2025-04-14
Payer: COMMERCIAL

## 2025-04-14 VITALS
WEIGHT: 185 LBS | RESPIRATION RATE: 12 BRPM | DIASTOLIC BLOOD PRESSURE: 75 MMHG | OXYGEN SATURATION: 95 % | TEMPERATURE: 97.7 F | BODY MASS INDEX: 29.73 KG/M2 | SYSTOLIC BLOOD PRESSURE: 125 MMHG | HEART RATE: 58 BPM | HEIGHT: 66 IN

## 2025-04-14 DIAGNOSIS — T14.8XXA BRUISING: ICD-10-CM

## 2025-04-14 DIAGNOSIS — E11.59 TYPE 2 DIABETES MELLITUS WITH OTHER CIRCULATORY COMPLICATION, WITHOUT LONG-TERM CURRENT USE OF INSULIN (H): ICD-10-CM

## 2025-04-14 DIAGNOSIS — I25.10 CORONARY ARTERY DISEASE INVOLVING NATIVE CORONARY ARTERY OF NATIVE HEART WITHOUT ANGINA PECTORIS: ICD-10-CM

## 2025-04-14 DIAGNOSIS — F10.21 ALCOHOL DEPENDENCE IN REMISSION (H): ICD-10-CM

## 2025-04-14 DIAGNOSIS — I10 BENIGN ESSENTIAL HYPERTENSION: ICD-10-CM

## 2025-04-14 DIAGNOSIS — Z00.00 ENCOUNTER FOR MEDICARE ANNUAL WELLNESS EXAM: Primary | ICD-10-CM

## 2025-04-14 LAB
ALBUMIN SERPL BCG-MCNC: 3.9 G/DL (ref 3.5–5.2)
ALP SERPL-CCNC: 58 U/L (ref 40–150)
ALT SERPL W P-5'-P-CCNC: 24 U/L (ref 0–70)
ANION GAP SERPL CALCULATED.3IONS-SCNC: 9 MMOL/L (ref 7–15)
AST SERPL W P-5'-P-CCNC: 22 U/L (ref 0–45)
BASOPHILS # BLD AUTO: 0 10E3/UL (ref 0–0.2)
BASOPHILS NFR BLD AUTO: 0 %
BILIRUB SERPL-MCNC: 0.5 MG/DL
BUN SERPL-MCNC: 14.7 MG/DL (ref 8–23)
CALCIUM SERPL-MCNC: 9.5 MG/DL (ref 8.8–10.4)
CHLORIDE SERPL-SCNC: 104 MMOL/L (ref 98–107)
CHOLEST SERPL-MCNC: 148 MG/DL
CREAT SERPL-MCNC: 0.98 MG/DL (ref 0.67–1.17)
CREAT UR-MCNC: 69.5 MG/DL
EGFRCR SERPLBLD CKD-EPI 2021: 82 ML/MIN/1.73M2
EOSINOPHIL # BLD AUTO: 0.1 10E3/UL (ref 0–0.7)
EOSINOPHIL NFR BLD AUTO: 1 %
ERYTHROCYTE [DISTWIDTH] IN BLOOD BY AUTOMATED COUNT: 13.4 % (ref 10–15)
EST. AVERAGE GLUCOSE BLD GHB EST-MCNC: 157 MG/DL
FASTING STATUS PATIENT QL REPORTED: ABNORMAL
FASTING STATUS PATIENT QL REPORTED: NORMAL
GLUCOSE SERPL-MCNC: 145 MG/DL (ref 70–99)
HBA1C MFR BLD: 7.1 % (ref 0–5.6)
HCO3 SERPL-SCNC: 23 MMOL/L (ref 22–29)
HCT VFR BLD AUTO: 49.8 % (ref 40–53)
HDLC SERPL-MCNC: 55 MG/DL
HGB BLD-MCNC: 16.3 G/DL (ref 13.3–17.7)
IMM GRANULOCYTES # BLD: 0 10E3/UL
IMM GRANULOCYTES NFR BLD: 0 %
LDLC SERPL CALC-MCNC: 63 MG/DL
LYMPHOCYTES # BLD AUTO: 2.5 10E3/UL (ref 0.8–5.3)
LYMPHOCYTES NFR BLD AUTO: 36 %
MCH RBC QN AUTO: 28.8 PG (ref 26.5–33)
MCHC RBC AUTO-ENTMCNC: 32.7 G/DL (ref 31.5–36.5)
MCV RBC AUTO: 88 FL (ref 78–100)
MICROALBUMIN UR-MCNC: <12 MG/L
MICROALBUMIN/CREAT UR: NORMAL MG/G{CREAT}
MONOCYTES # BLD AUTO: 0.4 10E3/UL (ref 0–1.3)
MONOCYTES NFR BLD AUTO: 6 %
NEUTROPHILS # BLD AUTO: 3.9 10E3/UL (ref 1.6–8.3)
NEUTROPHILS NFR BLD AUTO: 55 %
NONHDLC SERPL-MCNC: 93 MG/DL
PLATELET # BLD AUTO: 172 10E3/UL (ref 150–450)
POTASSIUM SERPL-SCNC: 4.2 MMOL/L (ref 3.4–5.3)
PROT SERPL-MCNC: 6.6 G/DL (ref 6.4–8.3)
RBC # BLD AUTO: 5.65 10E6/UL (ref 4.4–5.9)
SODIUM SERPL-SCNC: 136 MMOL/L (ref 135–145)
TRIGL SERPL-MCNC: 149 MG/DL
WBC # BLD AUTO: 7 10E3/UL (ref 4–11)

## 2025-04-14 PROCEDURE — 80061 LIPID PANEL: CPT | Performed by: INTERNAL MEDICINE

## 2025-04-14 PROCEDURE — 99207 PR FOOT EXAM NO CHARGE: CPT | Performed by: INTERNAL MEDICINE

## 2025-04-14 PROCEDURE — 82043 UR ALBUMIN QUANTITATIVE: CPT | Performed by: INTERNAL MEDICINE

## 2025-04-14 PROCEDURE — 3078F DIAST BP <80 MM HG: CPT | Performed by: INTERNAL MEDICINE

## 2025-04-14 PROCEDURE — 36415 COLL VENOUS BLD VENIPUNCTURE: CPT | Performed by: INTERNAL MEDICINE

## 2025-04-14 PROCEDURE — 82570 ASSAY OF URINE CREATININE: CPT | Performed by: INTERNAL MEDICINE

## 2025-04-14 PROCEDURE — 85025 COMPLETE CBC W/AUTO DIFF WBC: CPT | Performed by: INTERNAL MEDICINE

## 2025-04-14 PROCEDURE — G2211 COMPLEX E/M VISIT ADD ON: HCPCS | Performed by: INTERNAL MEDICINE

## 2025-04-14 PROCEDURE — G0439 PPPS, SUBSEQ VISIT: HCPCS | Performed by: INTERNAL MEDICINE

## 2025-04-14 PROCEDURE — 99213 OFFICE O/P EST LOW 20 MIN: CPT | Mod: 25 | Performed by: INTERNAL MEDICINE

## 2025-04-14 PROCEDURE — 80053 COMPREHEN METABOLIC PANEL: CPT | Performed by: INTERNAL MEDICINE

## 2025-04-14 PROCEDURE — 83036 HEMOGLOBIN GLYCOSYLATED A1C: CPT | Performed by: INTERNAL MEDICINE

## 2025-04-14 PROCEDURE — 3074F SYST BP LT 130 MM HG: CPT | Performed by: INTERNAL MEDICINE

## 2025-04-14 NOTE — PATIENT INSTRUCTIONS
Patient Education   Preventive Care Advice   This is general advice given by our system to help you stay healthy. However, your care team may have specific advice just for you. Please talk to your care team about your preventive care needs.  Nutrition  Eat 5 or more servings of fruits and vegetables each day.  Try wheat bread, brown rice and whole grain pasta (instead of white bread, rice, and pasta).  Get enough calcium and vitamin D. Check the label on foods and aim for 100% of the RDA (recommended daily allowance).  Lifestyle  Exercise at least 150 minutes each week  (30 minutes a day, 5 days a week).  Do muscle strengthening activities 2 days a week. These help control your weight and prevent disease.  No smoking.  Wear sunscreen to prevent skin cancer.  Have a dental exam and cleaning every 6 months.  Yearly exams  See your health care team every year to talk about:  Any changes in your health.  Any medicines your care team has prescribed.  Preventive care, family planning, and ways to prevent chronic diseases.  Shots (vaccines)   HPV shots (up to age 26), if you've never had them before.  Hepatitis B shots (up to age 59), if you've never had them before.  COVID-19 shot: Get this shot when it's due.  Flu shot: Get a flu shot every year.  Tetanus shot: Get a tetanus shot every 10 years.  Pneumococcal, hepatitis A, and RSV shots: Ask your care team if you need these based on your risk.  Shingles shot (for age 50 and up)  General health tests  Diabetes screening:  Starting at age 35, Get screened for diabetes at least every 3 years.  If you are younger than age 35, ask your care team if you should be screened for diabetes.  Cholesterol test: At age 39, start having a cholesterol test every 5 years, or more often if advised.  Bone density scan (DEXA): At age 50, ask your care team if you should have this scan for osteoporosis (brittle bones).  Hepatitis C: Get tested at least once in your life.  STIs (sexually  transmitted infections)  Before age 24: Ask your care team if you should be screened for STIs.  After age 24: Get screened for STIs if you're at risk. You are at risk for STIs (including HIV) if:  You are sexually active with more than one person.  You don't use condoms every time.  You or a partner was diagnosed with a sexually transmitted infection.  If you are at risk for HIV, ask about PrEP medicine to prevent HIV.  Get tested for HIV at least once in your life, whether you are at risk for HIV or not.  Cancer screening tests  Cervical cancer screening: If you have a cervix, begin getting regular cervical cancer screening tests starting at age 21.  Breast cancer scan (mammogram): If you've ever had breasts, begin having regular mammograms starting at age 40. This is a scan to check for breast cancer.  Colon cancer screening: It is important to start screening for colon cancer at age 45.  Have a colonoscopy test every 10 years (or more often if you're at risk) Or, ask your provider about stool tests like a FIT test every year or Cologuard test every 3 years.  To learn more about your testing options, visit:   .  For help making a decision, visit:   https://bit.ly/ha64883.  Prostate cancer screening test: If you have a prostate, ask your care team if a prostate cancer screening test (PSA) at age 55 is right for you.  Lung cancer screening: If you are a current or former smoker ages 50 to 80, ask your care team if ongoing lung cancer screenings are right for you.  For informational purposes only. Not to replace the advice of your health care provider. Copyright   2023 [employer]. All rights reserved. Clinically reviewed by the [facility]. [facility] [facility] [employer] 149222 - REV 01/24.

## 2025-07-11 ENCOUNTER — ANCILLARY PROCEDURE (OUTPATIENT)
Dept: GENERAL RADIOLOGY | Facility: CLINIC | Age: 75
End: 2025-07-11
Attending: PHYSICIAN ASSISTANT
Payer: COMMERCIAL

## 2025-07-11 DIAGNOSIS — M25.512 ACUTE PAIN OF LEFT SHOULDER: ICD-10-CM

## 2025-07-11 PROCEDURE — 73030 X-RAY EXAM OF SHOULDER: CPT | Mod: TC | Performed by: RADIOLOGY

## 2025-07-21 ENCOUNTER — OFFICE VISIT (OUTPATIENT)
Dept: PEDIATRICS | Facility: CLINIC | Age: 75
End: 2025-07-21
Payer: COMMERCIAL

## 2025-07-21 VITALS
WEIGHT: 183.2 LBS | SYSTOLIC BLOOD PRESSURE: 111 MMHG | TEMPERATURE: 98.2 F | RESPIRATION RATE: 17 BRPM | HEIGHT: 66 IN | HEART RATE: 69 BPM | OXYGEN SATURATION: 97 % | DIASTOLIC BLOOD PRESSURE: 72 MMHG | BODY MASS INDEX: 29.44 KG/M2

## 2025-07-21 DIAGNOSIS — Z98.890 HISTORY OF HOLTER MONITORING: ICD-10-CM

## 2025-07-21 DIAGNOSIS — M25.512 ACUTE PAIN OF LEFT SHOULDER: Primary | ICD-10-CM

## 2025-07-21 PROCEDURE — 99213 OFFICE O/P EST LOW 20 MIN: CPT | Performed by: PHYSICIAN ASSISTANT

## 2025-07-21 PROCEDURE — 3074F SYST BP LT 130 MM HG: CPT | Performed by: PHYSICIAN ASSISTANT

## 2025-07-21 PROCEDURE — 3078F DIAST BP <80 MM HG: CPT | Performed by: PHYSICIAN ASSISTANT

## 2025-07-21 NOTE — PROGRESS NOTES
"  Assessment & Plan     Acute pain of left shoulder  Referral to ortho placed.   - Orthopedic  Referral; Future    History of Holter monitoring  NEGATIVE for concerning arrhythmia. Ok to proceed with surgery.     Abelardo Li is a 74 year old, presenting for the following health issues:  RECHECK (Pre op follow up )      7/21/2025     2:36 PM   Additional Questions   Roomed by iDane Petersen MA     History of Present Illness       Reason for visit:  To go over a test result from the monitor i was wearing.   He is taking medications regularly.    Patient returns today to follow up on preop.    During his preop appointment, he was found to have palpitations. Ziopatch shows no significant arrhythmia. Labs are normal.     Patient has left shoulder pain and xray reveals arthritis.       Review of Systems  Constitutional, HEENT, cardiovascular, pulmonary, gi and gu systems are negative, except as otherwise noted.      Objective    /72 (BP Location: Right arm, Patient Position: Sitting, Cuff Size: Adult Regular)   Pulse 69   Temp 98.2  F (36.8  C) (Temporal)   Resp 17   Ht 1.664 m (5' 5.5\")   Wt 83.1 kg (183 lb 3.2 oz)   SpO2 97%   BMI 30.02 kg/m    Body mass index is 30.02 kg/m .  Physical Exam   GENERAL: alert and no distress  EYES: Eyes grossly normal to inspection, PERRL and conjunctivae and sclerae normal    No results found for any visits on 07/21/25.        Signed Electronically by: Susana Groves PA-C    "

## 2025-07-22 ENCOUNTER — PATIENT OUTREACH (OUTPATIENT)
Dept: CARE COORDINATION | Facility: CLINIC | Age: 75
End: 2025-07-22
Payer: COMMERCIAL

## 2025-07-24 ENCOUNTER — PATIENT OUTREACH (OUTPATIENT)
Dept: CARE COORDINATION | Facility: CLINIC | Age: 75
End: 2025-07-24
Payer: COMMERCIAL

## 2025-08-04 SDOH — HEALTH STABILITY: PHYSICAL HEALTH: ON AVERAGE, HOW MANY MINUTES DO YOU ENGAGE IN EXERCISE AT THIS LEVEL?: 30 MIN

## 2025-08-06 ENCOUNTER — OFFICE VISIT (OUTPATIENT)
Dept: ORTHOPEDICS | Facility: CLINIC | Age: 75
End: 2025-08-06
Attending: PHYSICIAN ASSISTANT
Payer: COMMERCIAL

## 2025-08-06 DIAGNOSIS — M75.102 ROTATOR CUFF SYNDROME, LEFT: Primary | ICD-10-CM

## 2025-08-06 PROCEDURE — 20611 DRAIN/INJ JOINT/BURSA W/US: CPT | Mod: LT | Performed by: STUDENT IN AN ORGANIZED HEALTH CARE EDUCATION/TRAINING PROGRAM

## 2025-08-06 PROCEDURE — 99204 OFFICE O/P NEW MOD 45 MIN: CPT | Mod: 25 | Performed by: STUDENT IN AN ORGANIZED HEALTH CARE EDUCATION/TRAINING PROGRAM

## 2025-08-06 RX ORDER — TRIAMCINOLONE ACETONIDE 40 MG/ML
40 INJECTION, SUSPENSION INTRA-ARTICULAR; INTRAMUSCULAR
Status: COMPLETED | OUTPATIENT
Start: 2025-08-06 | End: 2025-08-06

## 2025-08-06 RX ORDER — LIDOCAINE HYDROCHLORIDE 10 MG/ML
1 INJECTION, SOLUTION INFILTRATION; PERINEURAL
Status: COMPLETED | OUTPATIENT
Start: 2025-08-06 | End: 2025-08-06

## 2025-08-06 RX ADMIN — TRIAMCINOLONE ACETONIDE 40 MG: 40 INJECTION, SUSPENSION INTRA-ARTICULAR; INTRAMUSCULAR at 09:08

## 2025-08-06 RX ADMIN — LIDOCAINE HYDROCHLORIDE 1 ML: 10 INJECTION, SOLUTION INFILTRATION; PERINEURAL at 09:08

## 2027-02-27 ENCOUNTER — MEDICAL CORRESPONDENCE (OUTPATIENT)
Dept: HEALTH INFORMATION MANAGEMENT | Facility: CLINIC | Age: 77
End: 2027-02-27
Payer: COMMERCIAL

## (undated) DEVICE — KIT ENDO TURNOVER/PROCEDURE W/CLEAN A SCOPE LINERS 103888

## (undated) RX ORDER — FENTANYL CITRATE 50 UG/ML
INJECTION, SOLUTION INTRAMUSCULAR; INTRAVENOUS
Status: DISPENSED
Start: 2024-03-12